# Patient Record
Sex: FEMALE | Race: WHITE | NOT HISPANIC OR LATINO | Employment: UNEMPLOYED | URBAN - NONMETROPOLITAN AREA
[De-identification: names, ages, dates, MRNs, and addresses within clinical notes are randomized per-mention and may not be internally consistent; named-entity substitution may affect disease eponyms.]

---

## 2017-01-05 RX ORDER — PEN NEEDLE, DIABETIC 32GX 5/32"
NEEDLE, DISPOSABLE MISCELLANEOUS
Qty: 50 EACH | Refills: 5 | Status: SHIPPED | OUTPATIENT
Start: 2017-01-05 | End: 2017-08-08

## 2017-01-10 ENCOUNTER — OFFICE VISIT (OUTPATIENT)
Dept: FAMILY MEDICINE CLINIC | Facility: CLINIC | Age: 52
End: 2017-01-10

## 2017-01-10 DIAGNOSIS — I10 ESSENTIAL HYPERTENSION: ICD-10-CM

## 2017-01-10 DIAGNOSIS — R30.0 DYSURIA: Primary | ICD-10-CM

## 2017-01-10 DIAGNOSIS — G89.4 CHRONIC PAIN SYNDROME: ICD-10-CM

## 2017-01-10 DIAGNOSIS — F17.200 SMOKER: ICD-10-CM

## 2017-01-10 DIAGNOSIS — Z00.00 HEALTHCARE MAINTENANCE: ICD-10-CM

## 2017-01-10 DIAGNOSIS — E78.2 MIXED HYPERLIPIDEMIA: ICD-10-CM

## 2017-01-10 DIAGNOSIS — E11.42 TYPE 2 DIABETES MELLITUS WITH PERIPHERAL NEUROPATHY (HCC): ICD-10-CM

## 2017-01-10 DIAGNOSIS — F32.89 OTHER DEPRESSION: ICD-10-CM

## 2017-01-10 DIAGNOSIS — K21.9 GASTROESOPHAGEAL REFLUX DISEASE WITHOUT ESOPHAGITIS: ICD-10-CM

## 2017-01-10 DIAGNOSIS — Z23 ENCOUNTER FOR IMMUNIZATION: ICD-10-CM

## 2017-01-10 LAB
ALBUMIN SERPL-MCNC: 4.4 G/DL (ref 3.5–5)
ALBUMIN UR-MCNC: 1574.3 MG/L
ALBUMIN/GLOB SERPL: 1.2 G/DL (ref 1.5–2.5)
ALP SERPL-CCNC: 243 U/L (ref 46–116)
ALT SERPL W P-5'-P-CCNC: 26 U/L (ref 10–36)
ANION GAP SERPL CALCULATED.3IONS-SCNC: 7.9 MMOL/L (ref 3.6–11.2)
AST SERPL-CCNC: 17 U/L (ref 10–30)
BASOPHILS # BLD AUTO: 0.04 10*3/MM3 (ref 0–0.3)
BASOPHILS NFR BLD AUTO: 0.3 % (ref 0–2)
BILIRUB SERPL-MCNC: 0.5 MG/DL (ref 0.2–1.8)
BUN BLD-MCNC: 10 MG/DL (ref 7–21)
BUN/CREAT SERPL: 16.9 (ref 7–25)
CALCIUM SPEC-SCNC: 9.9 MG/DL (ref 7.7–10)
CHLORIDE SERPL-SCNC: 95 MMOL/L (ref 99–112)
CHOLEST SERPL-MCNC: 518 MG/DL (ref 0–200)
CO2 SERPL-SCNC: 30.1 MMOL/L (ref 24.3–31.9)
CREAT BLD-MCNC: 0.59 MG/DL (ref 0.43–1.29)
DEPRECATED RDW RBC AUTO: 45.4 FL (ref 37–54)
EOSINOPHIL # BLD AUTO: 0.15 10*3/MM3 (ref 0–0.7)
EOSINOPHIL NFR BLD AUTO: 1 % (ref 0–5)
ERYTHROCYTE [DISTWIDTH] IN BLOOD BY AUTOMATED COUNT: 14 % (ref 11.5–14.5)
GFR SERPL CREATININE-BSD FRML MDRD: 108 ML/MIN/1.73
GLOBULIN UR ELPH-MCNC: 3.8 GM/DL
GLUCOSE BLD-MCNC: 425 MG/DL (ref 70–110)
HBA1C MFR BLD: 12.4 % (ref 4.5–5.7)
HCT VFR BLD AUTO: 53.5 % (ref 37–47)
HDLC SERPL-MCNC: 60 MG/DL (ref 60–100)
HGB BLD-MCNC: 17.6 G/DL (ref 12–16)
IMM GRANULOCYTES # BLD: 0.05 10*3/MM3 (ref 0–0.03)
IMM GRANULOCYTES NFR BLD: 0.3 % (ref 0–0.5)
LDLC SERPL CALC-MCNC: ABNORMAL MG/DL (ref 0–100)
LDLC/HDLC SERPL: ABNORMAL {RATIO}
LYMPHOCYTES # BLD AUTO: 2.8 10*3/MM3 (ref 1–3)
LYMPHOCYTES NFR BLD AUTO: 19.2 % (ref 21–51)
MCH RBC QN AUTO: 29.4 PG (ref 27–33)
MCHC RBC AUTO-ENTMCNC: 32.9 G/DL (ref 33–37)
MCV RBC AUTO: 89.5 FL (ref 80–94)
MONOCYTES # BLD AUTO: 1.13 10*3/MM3 (ref 0.1–0.9)
MONOCYTES NFR BLD AUTO: 7.7 % (ref 0–10)
NEUTROPHILS # BLD AUTO: 10.43 10*3/MM3 (ref 1.4–6.5)
NEUTROPHILS NFR BLD AUTO: 71.5 % (ref 30–70)
OSMOLALITY SERPL CALC.SUM OF ELEC: 283.6 MOSM/KG (ref 273–305)
PLATELET # BLD AUTO: 338 10*3/MM3 (ref 130–400)
PMV BLD AUTO: 12 FL (ref 6–10)
POTASSIUM BLD-SCNC: 4 MMOL/L (ref 3.5–5.3)
PROT SERPL-MCNC: 8.2 G/DL (ref 6–8)
RBC # BLD AUTO: 5.98 10*6/MM3 (ref 4.2–5.4)
SODIUM BLD-SCNC: 133 MMOL/L (ref 135–153)
TRIGL SERPL-MCNC: 1628 MG/DL (ref 0–150)
TSH SERPL DL<=0.05 MIU/L-ACNC: 1.3 MIU/ML (ref 0.55–4.78)
VLDLC SERPL-MCNC: ABNORMAL MG/DL
WBC NRBC COR # BLD: 14.6 10*3/MM3 (ref 4.5–12.5)

## 2017-01-10 PROCEDURE — 90732 PPSV23 VACC 2 YRS+ SUBQ/IM: CPT | Performed by: GENERAL PRACTICE

## 2017-01-10 PROCEDURE — 82043 UR ALBUMIN QUANTITATIVE: CPT | Performed by: GENERAL PRACTICE

## 2017-01-10 PROCEDURE — 80053 COMPREHEN METABOLIC PANEL: CPT | Performed by: GENERAL PRACTICE

## 2017-01-10 PROCEDURE — 84443 ASSAY THYROID STIM HORMONE: CPT | Performed by: GENERAL PRACTICE

## 2017-01-10 PROCEDURE — 85025 COMPLETE CBC W/AUTO DIFF WBC: CPT | Performed by: GENERAL PRACTICE

## 2017-01-10 PROCEDURE — G0009 ADMIN PNEUMOCOCCAL VACCINE: HCPCS | Performed by: GENERAL PRACTICE

## 2017-01-10 PROCEDURE — 83036 HEMOGLOBIN GLYCOSYLATED A1C: CPT | Performed by: GENERAL PRACTICE

## 2017-01-10 PROCEDURE — 80061 LIPID PANEL: CPT | Performed by: GENERAL PRACTICE

## 2017-01-10 PROCEDURE — 99214 OFFICE O/P EST MOD 30 MIN: CPT | Performed by: GENERAL PRACTICE

## 2017-01-10 PROCEDURE — 36415 COLL VENOUS BLD VENIPUNCTURE: CPT | Performed by: GENERAL PRACTICE

## 2017-01-10 NOTE — MR AVS SNAPSHOT
Jocelyn Palomo   1/10/2017 3:00 PM   Office Visit    Dept Phone:  [phone]   Encounter #:  [id number]    Provider:  Miki Christianson MD   Department:  [facility] FAMILY MEDICINE                Your Full Care Plan              Today's Medication Changes          These changes are accurate as of: 1/10/17  3:50 PM.  If you have any questions, ask your nurse or doctor.               Medication(s)that have changed:     insulin detemir 100 UNIT/ML injection   Commonly known as:  LEVEMIR   Inject 40 Units under the skin Daily.   What changed:  how much to take   Changed by:  Miki Christianson MD            Where to Get Your Medications      These medications were sent to [place], [place] - [address] - [phone] [facility] [phone] [address] [address]     Phone:  [phone]     insulin aspart 100 UNIT/ML solution pen-injector sc pen    insulin detemir 100 UNIT/ML injection                  Your Updated Medication List          This list is accurate as of: 1/10/17  3:50 PM.  Always use your most recent med list.                atorvastatin 80 MG tablet   Commonly known as:  LIPITOR   Take 1 tablet by mouth every night.       * BD PEN NEEDLE SNEHA U/F 32G X 4 MM misc   Generic drug:  Insulin Pen Needle   USE WITH INSULIN ONCE DAILY       * Insulin Pen Needle 32G X 4 MM misc   1 each 4 (Four) Times a Day.       fenofibrate 145 MG tablet   Commonly known as:  TRICOR   Take 1 tablet by mouth daily.       gabapentin 600 MG tablet   Commonly known as:  NEURONTIN       insulin aspart 100 UNIT/ML solution pen-injector sc pen   Commonly known as:  novoLOG FLEXPEN   5 units SC before each meal       insulin detemir 100 UNIT/ML injection   Commonly known as:  LEVEMIR   Inject 40 Units under the skin Daily.       lisinopril-hydrochlorothiazide 20-25 MG per tablet   Commonly known as:  PRINZIDE,ZESTORETIC   Take 1 tablet by mouth  every morning.       Omega 3 1000 MG capsule   Take 2,000 mg by mouth 2 (two) times a day.       ondansetron 4 MG tablet   Commonly known as:  ZOFRAN   Take 1 tablet by mouth every 8 (eight) hours as needed for nausea or vomiting.       oxyCODONE-acetaminophen  MG per tablet   Commonly known as:  PERCOCET       pantoprazole 40 MG EC tablet   Commonly known as:  PROTONIX   Take 1 tablet by mouth daily.       sertraline 50 MG tablet   Commonly known as:  ZOLOFT   Take 1 tablet by mouth daily.       * Notice:  This list has 2 medication(s) that are the same as other medications prescribed for you. Read the directions carefully, and ask your doctor or other care provider to review them with you.            We Performed the Following     CBC & Differential     CBC Auto Differential     Comprehensive Metabolic Panel     Hemoglobin A1c     Lipid Panel     MicroAlbumin, Urine, Random     Pneumococcal Polysaccharide Vaccine 23-Valent Greater Than or Equal To 1yo Subcutaneous / IM     TSH       You Were Diagnosed With        Codes Comments    Dysuria    -  Primary ICD-10-CM: R30.0  ICD-9-CM: 788.1     Essential hypertension     ICD-10-CM: I10  ICD-9-CM: 401.9     Gastroesophageal reflux disease without esophagitis     ICD-10-CM: K21.9  ICD-9-CM: 530.81     Type 2 diabetes mellitus with peripheral neuropathy     ICD-10-CM: E11.42  ICD-9-CM: 250.60, 357.2     Chronic pain syndrome     ICD-10-CM: G89.4  ICD-9-CM: 338.4     Mixed hyperlipidemia     ICD-10-CM: E78.2  ICD-9-CM: 272.2     Smoker     ICD-10-CM: F17.200  ICD-9-CM: 305.1     Encounter for immunization     ICD-10-CM: Z23  ICD-9-CM: V03.89     Healthcare maintenance     ICD-10-CM: Z00.00  ICD-9-CM: V70.0       Instructions     None    Patient Instructions History      Upcoming Appointments     Visit Type Date Time Department    OFFICE VISIT 1/10/2017  3:00 PM [facility]    OFFICE VISIT 4/13/2017 11:00 AM [facility]      Deborah Signup     Our  "records indicate that you have declined [facility] MyChart signup. If you would like to sign up for [facility], please email [email or web address] or call [phone] to obtain an activation code.             Other Info from Your Visit           Your Appointments     Apr 13, 2017 11:00 AM EDT   Office Visit with Miki Christianson MD   [facility] (--)    [address] [place] [id number] [phone]           Please arrive 10 minutes early. Bring a complete list of all medications and bring any previous records or diagnostic testing results.              Allergies     No Known Allergies      Vital Signs     Pulse Temperature Height Weight Oxygen Saturation Body Mass Index    100 98.6 °F (37 °C) (Tympanic) 65\" (165.1 cm) 174 lb (78.9 kg) 96% 28.96 kg/m2    Smoking Status                   Current Every Day Smoker           Problems and Diagnoses Noted     Chronic pain syndrome    Encounter for immunization    High blood pressure    Acid reflux disease    Routine medical exam    Mixed hyperlipidemia    Smoker    Type 2 diabetes mellitus with peripheral neuropathy    Difficult or painful urination    -  Primary      Immunizations Administered     Name Date    Pneumococcal Polysaccharide       Results         "

## 2017-01-11 ENCOUNTER — TELEPHONE (OUTPATIENT)
Dept: FAMILY MEDICINE CLINIC | Facility: CLINIC | Age: 52
End: 2017-01-11

## 2017-01-11 VITALS
RESPIRATION RATE: 12 BRPM | TEMPERATURE: 98.6 F | SYSTOLIC BLOOD PRESSURE: 125 MMHG | HEIGHT: 65 IN | BODY MASS INDEX: 28.99 KG/M2 | DIASTOLIC BLOOD PRESSURE: 75 MMHG | WEIGHT: 174 LBS | OXYGEN SATURATION: 96 % | HEART RATE: 100 BPM

## 2017-01-11 NOTE — PROGRESS NOTES
Subjective   Jocelyn Palomo is a 51 y.o. female.     History of Present Illness     Diabetes  Current symptoms include paresthesia of the feet. Patient denies visual disturbances, polydipsia, polyuria, hypoglycemia and foot ulcerations. Evaluation to date has been: fasting blood sugar and hemoglobin A1C. Home sugars: BGs remain high particularly fasting. Current treatments: basal insulin - Levemir 30 units, and bolus insulin with meals - NovoLog 5 units.  Last dilated eye exam 2 to 3 years ago.      Dyslipidemia  Compliance with treatment has been fair. The patient exercises occasionally. She is currently being prescribed the following medication for her dyslipidemia - Lipitor (atorvastatin), Omega 3 fatty acids, Fenofibrate. Patient denies side effects associated with her medications.     Hypertension  Home blood pressure readings: not doing. Associated signs and symptoms: none. Patient denies: chest pain, palpitations, dyspnea, orthopnea, paroxysmal nocturnal dyspnea and peripheral edema. Current antihypertensive medications includes lisinopril (Prinivil, Zestril) and hydrochlorothiazide. Medication compliance: taking as prescribed.     Depression  Onset was many years ago. Symptoms have been stable since last here. Current symptoms include: depressed mood, difficulty concentrating and fatigue. Patient denies anhedonia, recurrent thoughts of death and suicidal thoughts. Risk factors: previous episode of depression and her son is currently on house arrest for his 4th DUI. Treatment has included psychotherapy and medication sertraline. She complains of the following side effects from the treatment: none.  She continues to feel that she is coping    The following portions of the patient's history were reviewed and updated as appropriate: allergies, current medications, past medical history, past social history and problem list.    Review of Systems   Constitutional: Positive for fatigue. Negative for appetite change,  chills, fever and unexpected weight change.   HENT: Negative for congestion, ear pain, rhinorrhea, sneezing, sore throat and voice change.    Eyes: Negative for visual disturbance.   Respiratory: Negative for cough, shortness of breath and wheezing.    Cardiovascular: Negative for chest pain, palpitations and leg swelling.   Gastrointestinal: Negative for abdominal pain, blood in stool, constipation, diarrhea, nausea and vomiting.   Endocrine: Negative for polydipsia.   Genitourinary: Negative for difficulty urinating, dysuria, frequency, hematuria, menstrual problem, pelvic pain, urgency, vaginal bleeding and vaginal discharge.   Musculoskeletal: Positive for arthralgias and back pain. Negative for joint swelling, myalgias and neck pain.   Skin: Negative for color change.   Neurological: Positive for numbness. Negative for tremors, weakness and headaches.   Psychiatric/Behavioral: Positive for decreased concentration and sleep disturbance. Negative for dysphoric mood and suicidal ideas. The patient is not nervous/anxious.      Objective   Physical Exam   Constitutional: She is oriented to person, place, and time. No distress.   HENT:   Head: Atraumatic.   Right Ear: Tympanic membrane, external ear and ear canal normal.   Left Ear: Tympanic membrane, external ear and ear canal normal.   Nose: Nose normal.   Mouth/Throat: Oropharynx is clear and moist. Mucous membranes are not pale and not cyanotic.   Eyes: EOM are normal. Pupils are equal, round, and reactive to light. No scleral icterus.   Neck: No JVD present. Carotid bruit is not present. No tracheal deviation present. No thyromegaly present.   Cardiovascular: Normal rate, regular rhythm, S1 normal, S2 normal and intact distal pulses.  Exam reveals no gallop, no S3 and no S4.    No murmur heard.  Pulmonary/Chest: Breath sounds normal. No stridor. No respiratory distress.   Abdominal: Soft. Normal aorta and bowel sounds are normal. She exhibits no distension, no  abdominal bruit and no mass. There is no hepatosplenomegaly. There is no tenderness. No hernia.   Musculoskeletal: She exhibits no tenderness or deformity.       Vascular Status -  Her exam exhibits no right foot edema. Her exam exhibits no left foot edema.  Lymphadenopathy:        Head (right side): No submandibular adenopathy present.        Head (left side): No submandibular adenopathy present.     She has no cervical adenopathy.   Neurological: She is alert and oriented to person, place, and time. She has normal reflexes. She displays normal reflexes. A sensory deficit (decreased vibration sense both feet) is present. No cranial nerve deficit. She exhibits normal muscle tone. Coordination normal.   Skin: Skin is warm and dry. No rash noted. She is not diaphoretic. No cyanosis. No pallor. Nails show no clubbing.   Psychiatric: Her speech is normal and behavior is normal. Thought content normal. Her affect is blunt (somewhat). Cognition and memory are normal.     Assessment/Plan   Problems Addressed this Visit        Cardiovascular and Mediastinum    Essential hypertension  Hypertension: at goal. Evidence of target organ damage: none.  Encouraged to continue to work on diet and exercise plan.   Continue current medication  Updated labs drawn    Relevant Orders    CBC & Differential (Completed)    Comprehensive Metabolic Panel (Completed)    CBC Auto Differential (Completed)    Osmolality, Calculated (Completed)       Digestive    Gastroesophageal reflux disease without esophagitis       Endocrine    Type 2 diabetes mellitus with peripheral neuropathy  Diabetes mellitus Type II, under poor control.   Encouraged to continue to pursue ADA diet  Encouraged aerobic exercise.  Increased dose of insulin: lantus to 40 qd and novolog to 6 with each meal.  Reminded to get yearly retinal exam.  Will notfiy patient of todays labs and any further action to be taken    Relevant Medications    insulin detemir (LEVEMIR) 100  UNIT/ML injection    insulin aspart (novoLOG FLEXPEN) 100 UNIT/ML solution pen-injector sc pen    Other Relevant Orders    Hemoglobin A1c (Completed)    MicroAlbumin, Urine, Random (Completed)       Other    Depression  Significant situational component. Supportive therapy. Will continue current medication.    Chronic pain syndrome  Reminded to follow up with pain management    Mixed hyperlipidemia  As above. Continue current medication.    Relevant Orders    Lipid Panel (Completed)    TSH (Completed)    Smoker    Encounter for immunization  Recommended a pneumovax    Relevant Orders    Pneumococcal Polysaccharide Vaccine 23-Valent Greater Than or Equal To 1yo Subcutaneous / IM (Completed)    Healthcare maintenance  Patient remains uninterested in a mammogram or screening colonoscopy

## 2017-01-11 NOTE — TELEPHONE ENCOUNTER
----- Message from Miki Christianson MD sent at 1/11/2017  3:14 PM EST -----  Needs appt with me in about 6 weeks  Needs to bring her sugar logs and all of her medication bottles

## 2017-03-07 DIAGNOSIS — K21.9 GASTROESOPHAGEAL REFLUX DISEASE WITHOUT ESOPHAGITIS: ICD-10-CM

## 2017-03-08 RX ORDER — PANTOPRAZOLE SODIUM 40 MG/1
40 TABLET, DELAYED RELEASE ORAL DAILY
Qty: 30 TABLET | Refills: 5 | Status: SHIPPED | OUTPATIENT
Start: 2017-03-08 | End: 2017-08-08 | Stop reason: SDUPTHER

## 2017-04-19 DIAGNOSIS — K21.9 GASTROESOPHAGEAL REFLUX DISEASE WITHOUT ESOPHAGITIS: ICD-10-CM

## 2017-04-20 RX ORDER — ONDANSETRON 4 MG/1
TABLET, FILM COATED ORAL
Qty: 30 TABLET | Refills: 0 | Status: SHIPPED | OUTPATIENT
Start: 2017-04-20 | End: 2017-07-24

## 2017-05-02 DIAGNOSIS — K21.9 GASTROESOPHAGEAL REFLUX DISEASE WITHOUT ESOPHAGITIS: ICD-10-CM

## 2017-05-03 DIAGNOSIS — K21.9 GASTROESOPHAGEAL REFLUX DISEASE WITHOUT ESOPHAGITIS: ICD-10-CM

## 2017-05-03 RX ORDER — ONDANSETRON 4 MG/1
TABLET, FILM COATED ORAL
Qty: 30 TABLET | Refills: 0 | Status: SHIPPED | OUTPATIENT
Start: 2017-05-03 | End: 2017-07-24

## 2017-05-23 ENCOUNTER — TELEPHONE (OUTPATIENT)
Dept: FAMILY MEDICINE CLINIC | Facility: CLINIC | Age: 52
End: 2017-05-23

## 2017-07-24 RX ORDER — ONDANSETRON HYDROCHLORIDE 8 MG/1
8 TABLET, FILM COATED ORAL EVERY 8 HOURS PRN
Qty: 30 TABLET | Refills: 2 | Status: SHIPPED | OUTPATIENT
Start: 2017-07-24 | End: 2017-08-08 | Stop reason: SDUPTHER

## 2017-08-08 ENCOUNTER — OFFICE VISIT (OUTPATIENT)
Dept: FAMILY MEDICINE CLINIC | Facility: CLINIC | Age: 52
End: 2017-08-08

## 2017-08-08 DIAGNOSIS — Z00.00 HEALTHCARE MAINTENANCE: ICD-10-CM

## 2017-08-08 DIAGNOSIS — K21.9 GASTROESOPHAGEAL REFLUX DISEASE WITHOUT ESOPHAGITIS: ICD-10-CM

## 2017-08-08 DIAGNOSIS — F33.41 RECURRENT MAJOR DEPRESSIVE DISORDER, IN PARTIAL REMISSION (HCC): ICD-10-CM

## 2017-08-08 DIAGNOSIS — E78.2 MIXED HYPERLIPIDEMIA: Primary | ICD-10-CM

## 2017-08-08 DIAGNOSIS — I10 ESSENTIAL HYPERTENSION: ICD-10-CM

## 2017-08-08 DIAGNOSIS — F17.200 SMOKER: ICD-10-CM

## 2017-08-08 DIAGNOSIS — F33.2 MAJOR DEPRESSIVE DISORDER, RECURRENT, SEVERE WITHOUT PSYCHOTIC FEATURES (HCC): ICD-10-CM

## 2017-08-08 DIAGNOSIS — G89.4 CHRONIC PAIN SYNDROME: ICD-10-CM

## 2017-08-08 DIAGNOSIS — E11.42 TYPE 2 DIABETES MELLITUS WITH PERIPHERAL NEUROPATHY (HCC): ICD-10-CM

## 2017-08-08 LAB
ALBUMIN SERPL-MCNC: 4.1 G/DL (ref 3.5–5)
ALBUMIN/GLOB SERPL: 1 G/DL (ref 1.5–2.5)
ALP SERPL-CCNC: 218 U/L (ref 35–104)
ALT SERPL W P-5'-P-CCNC: 24 U/L (ref 10–36)
ANION GAP SERPL CALCULATED.3IONS-SCNC: 5.8 MMOL/L (ref 3.6–11.2)
AST SERPL-CCNC: 19 U/L (ref 10–30)
BASOPHILS # BLD AUTO: 0.07 10*3/MM3 (ref 0–0.3)
BASOPHILS NFR BLD AUTO: 0.5 % (ref 0–2)
BILIRUB SERPL-MCNC: 0.4 MG/DL (ref 0.2–1.8)
BUN BLD-MCNC: 7 MG/DL (ref 7–21)
BUN/CREAT SERPL: 9.2 (ref 7–25)
CALCIUM SPEC-SCNC: 9.8 MG/DL (ref 7.7–10)
CHLORIDE SERPL-SCNC: 102 MMOL/L (ref 99–112)
CHOLEST SERPL-MCNC: 231 MG/DL (ref 0–200)
CO2 SERPL-SCNC: 29.2 MMOL/L (ref 24.3–31.9)
CREAT BLD-MCNC: 0.76 MG/DL (ref 0.43–1.29)
DEPRECATED RDW RBC AUTO: 42.4 FL (ref 37–54)
EOSINOPHIL # BLD AUTO: 0.26 10*3/MM3 (ref 0–0.7)
EOSINOPHIL NFR BLD AUTO: 1.8 % (ref 0–5)
ERYTHROCYTE [DISTWIDTH] IN BLOOD BY AUTOMATED COUNT: 13.3 % (ref 11.5–14.5)
GFR SERPL CREATININE-BSD FRML MDRD: 80 ML/MIN/1.73
GLOBULIN UR ELPH-MCNC: 4 GM/DL
GLUCOSE BLD-MCNC: 172 MG/DL (ref 70–110)
HBA1C MFR BLD: 8.1 % (ref 4.5–5.7)
HCT VFR BLD AUTO: 45 % (ref 37–47)
HDLC SERPL-MCNC: 40 MG/DL (ref 60–100)
HGB BLD-MCNC: 15.1 G/DL (ref 12–16)
IMM GRANULOCYTES # BLD: 0.03 10*3/MM3 (ref 0–0.03)
IMM GRANULOCYTES NFR BLD: 0.2 % (ref 0–0.5)
LDLC SERPL CALC-MCNC: ABNORMAL MG/DL (ref 0–100)
LDLC/HDLC SERPL: ABNORMAL {RATIO}
LYMPHOCYTES # BLD AUTO: 3.56 10*3/MM3 (ref 1–3)
LYMPHOCYTES NFR BLD AUTO: 24.6 % (ref 21–51)
MCH RBC QN AUTO: 29.6 PG (ref 27–33)
MCHC RBC AUTO-ENTMCNC: 33.6 G/DL (ref 33–37)
MCV RBC AUTO: 88.2 FL (ref 80–94)
MONOCYTES # BLD AUTO: 0.95 10*3/MM3 (ref 0.1–0.9)
MONOCYTES NFR BLD AUTO: 6.6 % (ref 0–10)
NEUTROPHILS # BLD AUTO: 9.6 10*3/MM3 (ref 1.4–6.5)
NEUTROPHILS NFR BLD AUTO: 66.3 % (ref 30–70)
OSMOLALITY SERPL CALC.SUM OF ELEC: 275.9 MOSM/KG (ref 273–305)
PLATELET # BLD AUTO: 287 10*3/MM3 (ref 130–400)
PMV BLD AUTO: 12.1 FL (ref 6–10)
POTASSIUM BLD-SCNC: 3.9 MMOL/L (ref 3.5–5.3)
PROT SERPL-MCNC: 8.1 G/DL (ref 6–8)
RBC # BLD AUTO: 5.1 10*6/MM3 (ref 4.2–5.4)
SODIUM BLD-SCNC: 137 MMOL/L (ref 135–153)
TRIGL SERPL-MCNC: 425 MG/DL (ref 0–150)
TSH SERPL DL<=0.05 MIU/L-ACNC: 1.29 MIU/ML (ref 0.55–4.78)
VLDLC SERPL-MCNC: ABNORMAL MG/DL
WBC NRBC COR # BLD: 14.47 10*3/MM3 (ref 4.5–12.5)

## 2017-08-08 PROCEDURE — 84443 ASSAY THYROID STIM HORMONE: CPT | Performed by: GENERAL PRACTICE

## 2017-08-08 PROCEDURE — 80053 COMPREHEN METABOLIC PANEL: CPT | Performed by: GENERAL PRACTICE

## 2017-08-08 PROCEDURE — 83036 HEMOGLOBIN GLYCOSYLATED A1C: CPT | Performed by: GENERAL PRACTICE

## 2017-08-08 PROCEDURE — 85025 COMPLETE CBC W/AUTO DIFF WBC: CPT | Performed by: GENERAL PRACTICE

## 2017-08-08 PROCEDURE — 36415 COLL VENOUS BLD VENIPUNCTURE: CPT | Performed by: GENERAL PRACTICE

## 2017-08-08 PROCEDURE — 99214 OFFICE O/P EST MOD 30 MIN: CPT | Performed by: GENERAL PRACTICE

## 2017-08-08 PROCEDURE — 80061 LIPID PANEL: CPT | Performed by: GENERAL PRACTICE

## 2017-08-08 RX ORDER — LISINOPRIL AND HYDROCHLOROTHIAZIDE 25; 20 MG/1; MG/1
1 TABLET ORAL EVERY MORNING
Qty: 30 TABLET | Refills: 5 | Status: SHIPPED | OUTPATIENT
Start: 2017-08-08 | End: 2018-03-02 | Stop reason: SDUPTHER

## 2017-08-08 RX ORDER — ONDANSETRON HYDROCHLORIDE 8 MG/1
8 TABLET, FILM COATED ORAL EVERY 8 HOURS PRN
Qty: 30 TABLET | Refills: 2 | Status: SHIPPED | OUTPATIENT
Start: 2017-08-08 | End: 2018-03-02

## 2017-08-08 RX ORDER — GABAPENTIN 600 MG/1
600 TABLET ORAL 3 TIMES DAILY
Qty: 90 TABLET | Refills: 2 | Status: SHIPPED | OUTPATIENT
Start: 2017-08-08 | End: 2018-03-02

## 2017-08-08 RX ORDER — OMEGA-3 FATTY ACIDS/FISH OIL 300-1000MG
2000 CAPSULE ORAL 2 TIMES DAILY
Qty: 120 EACH | Refills: 5 | Status: SHIPPED | OUTPATIENT
Start: 2017-08-08 | End: 2018-03-02 | Stop reason: SDUPTHER

## 2017-08-08 RX ORDER — FENOFIBRATE 145 MG/1
145 TABLET, COATED ORAL DAILY
Qty: 30 TABLET | Refills: 5 | Status: SHIPPED | OUTPATIENT
Start: 2017-08-08 | End: 2018-03-02 | Stop reason: SDUPTHER

## 2017-08-08 RX ORDER — PANTOPRAZOLE SODIUM 40 MG/1
40 TABLET, DELAYED RELEASE ORAL DAILY
Qty: 30 TABLET | Refills: 5 | Status: SHIPPED | OUTPATIENT
Start: 2017-08-08 | End: 2018-03-02 | Stop reason: SDUPTHER

## 2017-08-08 RX ORDER — ATORVASTATIN CALCIUM 80 MG/1
80 TABLET, FILM COATED ORAL NIGHTLY
Qty: 30 TABLET | Refills: 5 | Status: SHIPPED | OUTPATIENT
Start: 2017-08-08 | End: 2018-03-02 | Stop reason: SDUPTHER

## 2017-08-08 NOTE — PROGRESS NOTES
Subjective   Jocelyn Palomo is a 52 y.o. female.     History of Present Illness     Diabetes  Current symptoms include paresthesia of the feet. Patient denies visual disturbances, polydipsia, polyuria, hypoglycemia and foot ulcerations. Evaluation to date has been: hemoglobin A1C. Home sugars: BGs consistently in an acceptable range. Current treatments: basal insulin - levemir 30 qd and mealtime insulin - novolog 5 with meals. Last dilated eye exam more then 3 years ago. Most recent hemoglobin A1c   Lab Results   Component Value Date    HGBA1C 8.10 (H) 08/08/2017    HGBA1C 12.40 (H) 01/10/2017    HGBA1C 12.0 (H) 03/01/2016    HGBA1C 12.1 (H) 10/27/2015    HGBA1C 12.9 (H) 02/25/2014   An earlier fappointment was arranged but she repeatedly failed to follow up with this    Dyslipidemia  Compliance with treatment has been fair. The patient exercises occasionally. She is currently being prescribed the following medication for her dyslipidemia - atorvastatin, omega 3 fatty acids. Patient denies side effects associated with her medications. Most recent lipids include  Lab Results   Component Value Date    TRIG 425 (H) 08/08/2017    TRIG 1628 (H) 01/10/2017    HDL 40 (L) 08/08/2017    HDL 60 01/10/2017    LDLCALC  08/08/2017      Comment:      Unable to calculate    LDLCALC  01/10/2017      Comment:      Unable to calculate    LDL No Calculation 03/01/2016    LDL No Calculation 10/27/2015     Hypertension  Home blood pressure readings: not doing. Associated signs and symptoms: none. Patient denies: chest pain, palpitations, dyspnea, orthopnea, paroxysmal nocturnal dyspnea and peripheral edema. Current antihypertensive medications includes lisinopril and hydrochlorothiazide. Medication compliance: taking as prescribed. Most recent creatinine   Lab Results   Component Value Date    CREATININE 0.76 08/08/2017     Depression  Onset was many years ago. Symptoms have been stable since last here. Current symptoms include: depressed  mood, difficulty concentrating and fatigue. Patient denies anhedonia, recurrent thoughts of death and suicidal thoughts. Risk factors: previous episode of depression and significant family stressors. Treatment has included psychotherapy and medication sertraline. She complains of the following side effects from the treatment: none.  She continues to feel that she is coping    Chronic Pain Syndrome  She has chronic low back and joint pain as well as burning and tingling about both feet. She was under the care of pain management but states that injections were no longer felt to be appropriate and that she was discharged. She would like referral to another clinic but in the meantime would like her gabapentin prescribed here.     The following portions of the patient's history were reviewed and updated as appropriate: allergies, current medications, past medical history, past social history and problem list.    Review of Systems   Constitutional: Positive for fatigue. Negative for appetite change, chills, fever and unexpected weight change.   HENT: Negative for congestion, ear pain, rhinorrhea, sneezing, sore throat and voice change.    Eyes: Negative for visual disturbance.   Respiratory: Negative for cough, shortness of breath and wheezing.    Cardiovascular: Negative for chest pain, palpitations and leg swelling.   Gastrointestinal: Negative for abdominal pain, blood in stool, constipation, diarrhea, nausea and vomiting.   Endocrine: Negative for polydipsia.   Genitourinary: Negative for difficulty urinating, dysuria, frequency, hematuria, menstrual problem, pelvic pain, urgency, vaginal bleeding and vaginal discharge.   Musculoskeletal: Positive for arthralgias and back pain. Negative for joint swelling, myalgias and neck pain.   Skin: Negative for color change.   Neurological: Positive for numbness. Negative for tremors, weakness and headaches.   Psychiatric/Behavioral: Positive for decreased concentration and  sleep disturbance. Negative for dysphoric mood and suicidal ideas. The patient is not nervous/anxious.      Objective   Physical Exam   Constitutional: She is oriented to person, place, and time. No distress.   Alert and fully oriented. No apparent distress. No pallor, jaundice, diaphoresis, or cyanosis.     HENT:   Head: Atraumatic.   Right Ear: Tympanic membrane, external ear and ear canal normal.   Left Ear: Tympanic membrane, external ear and ear canal normal.   Nose: Nose normal.   Mouth/Throat: Oropharynx is clear and moist. Mucous membranes are not pale and not cyanotic.   Eyes: EOM are normal. Pupils are equal, round, and reactive to light. No scleral icterus.   Neck: No JVD present. Carotid bruit is not present. No tracheal deviation present. No thyromegaly present.   Cardiovascular: Normal rate, regular rhythm, S1 normal, S2 normal and intact distal pulses.  Exam reveals no gallop, no S3 and no S4.    No murmur heard.  Pulmonary/Chest: Breath sounds normal. No stridor. No respiratory distress.   Abdominal: Soft. Normal aorta and bowel sounds are normal. She exhibits no distension, no abdominal bruit and no mass. There is no hepatosplenomegaly. There is no tenderness. No hernia.   Musculoskeletal: She exhibits no tenderness or deformity.    Jocelyn had a diabetic foot exam performed today.    Vascular Status -  Her exam exhibits no right foot edema. Her exam exhibits no left foot edema.  Lymphadenopathy:        Head (right side): No submandibular adenopathy present.        Head (left side): No submandibular adenopathy present.     She has no cervical adenopathy.   Neurological: She is alert and oriented to person, place, and time. She has normal reflexes. She displays normal reflexes. A sensory deficit (decreased vibration sense both feet) is present. No cranial nerve deficit. She exhibits normal muscle tone. Coordination normal.   Skin: Skin is warm and dry. No rash noted. She is not diaphoretic. No  cyanosis. No pallor. Nails show no clubbing.   Psychiatric: Her speech is normal and behavior is normal. Thought content normal. Her affect is blunt (somewhat). Cognition and memory are normal.     Assessment/Plan   Problems Addressed this Visit        Cardiovascular and Mediastinum    Mixed hyperlipidemia   Encouraged to continue to work on her diet and exercise plan.  Updated labs drawn    Relevant Medications    atorvastatin (LIPITOR) 80 MG tablet    fenofibrate (TRICOR) 145 MG tablet    Other Relevant Orders    Lipid Panel (Completed)    TSH (Completed)    Essential hypertension  Hypertension: at goal. Evidence of target organ damage: none.  Continue current medication    Relevant Medications    lisinopril-hydrochlorothiazide (PRINZIDE,ZESTORETIC) 20-25 MG per tablet    Other Relevant Orders    CBC & Differential (Completed)    Comprehensive Metabolic Panel (Completed)    CBC Auto Differential (Completed)    Osmolality, Calculated (Completed)       Digestive    Gastroesophageal reflux disease without esophagitis    Relevant Medications    pantoprazole (PROTONIX) 40 MG EC tablet    ondansetron (ZOFRAN) 8 MG tablet       Endocrine    Type 2 diabetes mellitus with peripheral neuropathy  Diabetes mellitus Type II, under poor control.   Encouraged to continue to pursue ADA diet  Encouraged aerobic exercise.  Increased dose of insulin: levemir to 40 qd and novolog to 6 with meals.  Reminded to get yearly retinal exam.  Will arrange an endocrinology appointment as little progress is being made here toward improving her glycemic control    Relevant Medications    insulin aspart (novoLOG FLEXPEN) 100 UNIT/ML solution pen-injector sc pen    insulin detemir (LEVEMIR) 100 UNIT/ML injection    gabapentin (NEURONTIN) 600 MG tablet    Insulin Pen Needle 32G X 4 MM misc    Omega 3 1000 MG capsule    Other Relevant Orders    Hemoglobin A1c (Completed)    MicroAlbumin, Urine, Random       Other    Recurrent major depressive  disorder, in partial remission  Significant situational component. Supportive therapy. Will continue current medication.    Chronic pain syndrome  Gabapentin will be prescribed here until care with another pain management physician is set up. Patient is aware that this is now a controlled mediation in the [facility] and that she will be subject to random urine drug screens and pills counts    Relevant Medications    gabapentin (NEURONTIN) 600 MG tablet    Other Relevant Orders    Ambulatory Referral to Pain Management (Completed)    Smoker    Healthcare maintenance  Patient remains uninterested in a mammogram or screening colonoscopy  Reminded to get a flu shot when available

## 2017-08-09 ENCOUNTER — DOCUMENTATION (OUTPATIENT)
Dept: FAMILY MEDICINE CLINIC | Facility: CLINIC | Age: 52
End: 2017-08-09

## 2017-08-09 VITALS
BODY MASS INDEX: 27.82 KG/M2 | OXYGEN SATURATION: 97 % | RESPIRATION RATE: 12 BRPM | SYSTOLIC BLOOD PRESSURE: 120 MMHG | HEIGHT: 65 IN | TEMPERATURE: 98.7 F | WEIGHT: 167 LBS | HEART RATE: 102 BPM | DIASTOLIC BLOOD PRESSURE: 70 MMHG

## 2017-08-18 ENCOUNTER — DOCUMENTATION (OUTPATIENT)
Dept: FAMILY MEDICINE CLINIC | Facility: CLINIC | Age: 52
End: 2017-08-18

## 2017-08-18 NOTE — PROGRESS NOTES
8/18/2017 tmills    Per Dr Christianson's request, called [facility] pharmacy canceled refills on gabapentin (s/w lou at [facility])  Also tried calling patient to let her know that dr Christianson will not be continuing her gabapentin due to she failed her UDS. Left a message on  to please return my call. tm

## 2017-08-23 DIAGNOSIS — G89.4 CHRONIC PAIN SYNDROME: ICD-10-CM

## 2017-08-23 DIAGNOSIS — E11.42 TYPE 2 DIABETES MELLITUS WITH PERIPHERAL NEUROPATHY (HCC): Primary | ICD-10-CM

## 2017-09-05 ENCOUNTER — DOCUMENTATION (OUTPATIENT)
Dept: FAMILY MEDICINE CLINIC | Facility: CLINIC | Age: 52
End: 2017-09-05

## 2017-09-05 NOTE — PROGRESS NOTES
Pt called and ask why her gabapentin had been d/c at the pharmacy. Dr Christianson had instructed Ana Chávezs to call and discontinue Gabapentin due to a failed UDS. Advised pt to make an appt with Dr Christianson or one of the ARNP to discuss any issues with this. PKF.

## 2017-09-25 ENCOUNTER — TELEPHONE (OUTPATIENT)
Dept: FAMILY MEDICINE CLINIC | Facility: CLINIC | Age: 52
End: 2017-09-25

## 2017-09-25 NOTE — TELEPHONE ENCOUNTER
----- Message from Rosanna Collins MA sent at 9/25/2017  8:51 AM EDT -----  Regarding: RE: Pt requesting to be referred to pain clinic in [place]  No one in [place] takes her insurance.       ----- Message -----     From: Miki Christianson MD     Sent: 9/23/2017   9:52 AM       To: Miki Solomon MA, #  Subject: RE: Pt requesting to be referred to pain cli#    Can try to refer    ----- Message -----     From: Miki Solomon MA     Sent: 9/22/2017   2:18 PM       To: Miki Christianson MD  Subject: Pt requesting to be referred to pain clinic #    This patient called today asking if she could be referred to pain clinic in [place]. I think she had been referred to [place] but she has not transportation. She said she was trying to get a car and maybe she can go closer to [place]. Please advise. PKF.

## 2017-10-17 ENCOUNTER — TELEPHONE (OUTPATIENT)
Dept: FAMILY MEDICINE CLINIC | Facility: CLINIC | Age: 52
End: 2017-10-17

## 2017-10-17 NOTE — TELEPHONE ENCOUNTER
----- Message from Miki Christianson MD sent at 9/25/2017  4:54 PM EDT -----  Regarding: RE: Pt requesting to be referred to pain clinic in [place]  Please let her know - can refer to someone in [place] if she wishes    ----- Message -----     From: Rosanna Collins MA     Sent: 9/25/2017   8:51 AM       To: Miki Christianson MD, #  Subject: RE: Pt requesting to be referred to pain cli#    No one in [place] takes her insurance.       ----- Message -----     From: Miki Christianson MD     Sent: 9/23/2017   9:52 AM       To: Miki Solomon MA, #  Subject: RE: Pt requesting to be referred to pain cli#    Can try to refer    ----- Message -----     From: Miki Solomon MA     Sent: 9/22/2017   2:18 PM       To: Miki Christianson MD  Subject: Pt requesting to be referred to pain clinic #    This patient called today asking if she could be referred to pain clinic in [place]. I think she had been referred to [place] but she has not transportation. She said she was trying to get a car and maybe she can go closer to [place]. Please advise. PKF.

## 2018-03-02 ENCOUNTER — OFFICE VISIT (OUTPATIENT)
Dept: FAMILY MEDICINE CLINIC | Facility: CLINIC | Age: 53
End: 2018-03-02

## 2018-03-02 VITALS
SYSTOLIC BLOOD PRESSURE: 120 MMHG | BODY MASS INDEX: 28.32 KG/M2 | TEMPERATURE: 98.2 F | OXYGEN SATURATION: 99 % | HEIGHT: 65 IN | HEART RATE: 100 BPM | DIASTOLIC BLOOD PRESSURE: 70 MMHG | WEIGHT: 170 LBS | RESPIRATION RATE: 12 BRPM

## 2018-03-02 DIAGNOSIS — E11.42 TYPE 2 DIABETES MELLITUS WITH PERIPHERAL NEUROPATHY (HCC): ICD-10-CM

## 2018-03-02 DIAGNOSIS — R11.0 NAUSEA: ICD-10-CM

## 2018-03-02 DIAGNOSIS — G89.4 CHRONIC PAIN SYNDROME: ICD-10-CM

## 2018-03-02 DIAGNOSIS — F17.200 SMOKER: ICD-10-CM

## 2018-03-02 DIAGNOSIS — K21.9 GASTROESOPHAGEAL REFLUX DISEASE WITHOUT ESOPHAGITIS: ICD-10-CM

## 2018-03-02 DIAGNOSIS — I10 ESSENTIAL HYPERTENSION: ICD-10-CM

## 2018-03-02 DIAGNOSIS — G89.29 CHRONIC NECK PAIN: ICD-10-CM

## 2018-03-02 DIAGNOSIS — M54.2 CHRONIC NECK PAIN: ICD-10-CM

## 2018-03-02 DIAGNOSIS — F33.41 RECURRENT MAJOR DEPRESSIVE DISORDER, IN PARTIAL REMISSION (HCC): ICD-10-CM

## 2018-03-02 DIAGNOSIS — Z00.00 HEALTHCARE MAINTENANCE: ICD-10-CM

## 2018-03-02 DIAGNOSIS — B37.31 CANDIDAL VAGINITIS: ICD-10-CM

## 2018-03-02 DIAGNOSIS — E78.2 MIXED HYPERLIPIDEMIA: Primary | ICD-10-CM

## 2018-03-02 LAB
ALBUMIN SERPL-MCNC: 4.4 G/DL (ref 3.5–5)
ALBUMIN/GLOB SERPL: 1.3 G/DL (ref 1.5–2.5)
ALP SERPL-CCNC: 210 U/L (ref 35–104)
ALT SERPL W P-5'-P-CCNC: 36 U/L (ref 10–36)
ANION GAP SERPL CALCULATED.3IONS-SCNC: 8.4 MMOL/L (ref 3.6–11.2)
AST SERPL-CCNC: 34 U/L (ref 10–30)
BASOPHILS # BLD AUTO: 0.09 10*3/MM3 (ref 0–0.3)
BASOPHILS NFR BLD AUTO: 0.7 % (ref 0–2)
BILIRUB SERPL-MCNC: 0.6 MG/DL (ref 0.2–1.8)
BUN BLD-MCNC: 13 MG/DL (ref 7–21)
BUN/CREAT SERPL: 18.6 (ref 7–25)
CALCIUM SPEC-SCNC: 9.4 MG/DL (ref 7.7–10)
CHLORIDE SERPL-SCNC: 99 MMOL/L (ref 99–112)
CHOLEST SERPL-MCNC: 209 MG/DL (ref 0–200)
CO2 SERPL-SCNC: 28.6 MMOL/L (ref 24.3–31.9)
CREAT BLD-MCNC: 0.7 MG/DL (ref 0.43–1.29)
DEPRECATED RDW RBC AUTO: 41.1 FL (ref 37–54)
EOSINOPHIL # BLD AUTO: 0.43 10*3/MM3 (ref 0–0.7)
EOSINOPHIL NFR BLD AUTO: 3.4 % (ref 0–5)
ERYTHROCYTE [DISTWIDTH] IN BLOOD BY AUTOMATED COUNT: 13.2 % (ref 11.5–14.5)
GFR SERPL CREATININE-BSD FRML MDRD: 88 ML/MIN/1.73
GLOBULIN UR ELPH-MCNC: 3.3 GM/DL
GLUCOSE BLD-MCNC: 205 MG/DL (ref 70–110)
HBA1C MFR BLD: 10.3 % (ref 4.5–5.7)
HCT VFR BLD AUTO: 43 % (ref 37–47)
HDLC SERPL-MCNC: 41 MG/DL (ref 60–100)
HGB BLD-MCNC: 14.5 G/DL (ref 12–16)
IMM GRANULOCYTES # BLD: 0.04 10*3/MM3 (ref 0–0.03)
IMM GRANULOCYTES NFR BLD: 0.3 % (ref 0–0.5)
LDLC SERPL CALC-MCNC: 128 MG/DL (ref 0–100)
LDLC/HDLC SERPL: 3.12 {RATIO}
LYMPHOCYTES # BLD AUTO: 3.57 10*3/MM3 (ref 1–3)
LYMPHOCYTES NFR BLD AUTO: 28.5 % (ref 21–51)
MCH RBC QN AUTO: 29.4 PG (ref 27–33)
MCHC RBC AUTO-ENTMCNC: 33.7 G/DL (ref 33–37)
MCV RBC AUTO: 87 FL (ref 80–94)
MONOCYTES # BLD AUTO: 1.08 10*3/MM3 (ref 0.1–0.9)
MONOCYTES NFR BLD AUTO: 8.6 % (ref 0–10)
NEUTROPHILS # BLD AUTO: 7.33 10*3/MM3 (ref 1.4–6.5)
NEUTROPHILS NFR BLD AUTO: 58.5 % (ref 30–70)
OSMOLALITY SERPL CALC.SUM OF ELEC: 278 MOSM/KG (ref 273–305)
PLATELET # BLD AUTO: 343 10*3/MM3 (ref 130–400)
PMV BLD AUTO: 11.3 FL (ref 6–10)
POTASSIUM BLD-SCNC: 3.2 MMOL/L (ref 3.5–5.3)
PROT SERPL-MCNC: 7.7 G/DL (ref 6–8)
RBC # BLD AUTO: 4.94 10*6/MM3 (ref 4.2–5.4)
SODIUM BLD-SCNC: 136 MMOL/L (ref 135–153)
TRIGL SERPL-MCNC: 200 MG/DL (ref 0–150)
TSH SERPL DL<=0.05 MIU/L-ACNC: 1.01 MIU/ML (ref 0.55–4.78)
VLDLC SERPL-MCNC: 40 MG/DL
WBC NRBC COR # BLD: 12.54 10*3/MM3 (ref 4.5–12.5)

## 2018-03-02 PROCEDURE — 85025 COMPLETE CBC W/AUTO DIFF WBC: CPT | Performed by: GENERAL PRACTICE

## 2018-03-02 PROCEDURE — 84443 ASSAY THYROID STIM HORMONE: CPT | Performed by: GENERAL PRACTICE

## 2018-03-02 PROCEDURE — 80061 LIPID PANEL: CPT | Performed by: GENERAL PRACTICE

## 2018-03-02 PROCEDURE — 80053 COMPREHEN METABOLIC PANEL: CPT | Performed by: GENERAL PRACTICE

## 2018-03-02 PROCEDURE — 83036 HEMOGLOBIN GLYCOSYLATED A1C: CPT | Performed by: GENERAL PRACTICE

## 2018-03-02 PROCEDURE — 99214 OFFICE O/P EST MOD 30 MIN: CPT | Performed by: GENERAL PRACTICE

## 2018-03-02 PROCEDURE — 36415 COLL VENOUS BLD VENIPUNCTURE: CPT | Performed by: GENERAL PRACTICE

## 2018-03-02 RX ORDER — ATORVASTATIN CALCIUM 80 MG/1
80 TABLET, FILM COATED ORAL NIGHTLY
Qty: 30 TABLET | Refills: 5 | Status: SHIPPED | OUTPATIENT
Start: 2018-03-02 | End: 2018-12-04

## 2018-03-02 RX ORDER — PANTOPRAZOLE SODIUM 40 MG/1
40 TABLET, DELAYED RELEASE ORAL DAILY
Qty: 30 TABLET | Refills: 5 | Status: SHIPPED | OUTPATIENT
Start: 2018-03-02 | End: 2018-08-01 | Stop reason: SDUPTHER

## 2018-03-02 RX ORDER — PROMETHAZINE HYDROCHLORIDE 25 MG/1
25 TABLET ORAL EVERY 6 HOURS PRN
Qty: 60 TABLET | Refills: 0 | Status: SHIPPED | OUTPATIENT
Start: 2018-03-02 | End: 2018-11-28 | Stop reason: SDUPTHER

## 2018-03-02 RX ORDER — FENOFIBRATE 145 MG/1
145 TABLET, COATED ORAL DAILY
Qty: 30 TABLET | Refills: 5 | Status: SHIPPED | OUTPATIENT
Start: 2018-03-02 | End: 2018-12-04

## 2018-03-02 RX ORDER — OMEGA-3 FATTY ACIDS/FISH OIL 300-1000MG
2000 CAPSULE ORAL 2 TIMES DAILY
Qty: 120 EACH | Refills: 5 | Status: SHIPPED | OUTPATIENT
Start: 2018-03-02 | End: 2020-07-02 | Stop reason: SDUPTHER

## 2018-03-02 RX ORDER — LISINOPRIL AND HYDROCHLOROTHIAZIDE 25; 20 MG/1; MG/1
1 TABLET ORAL EVERY MORNING
Qty: 30 TABLET | Refills: 5 | Status: SHIPPED | OUTPATIENT
Start: 2018-03-02 | End: 2018-12-04

## 2018-03-02 RX ORDER — FLUCONAZOLE 150 MG/1
150 TABLET ORAL ONCE
Qty: 1 TABLET | Refills: 2 | Status: SHIPPED | OUTPATIENT
Start: 2018-03-02 | End: 2019-07-22 | Stop reason: SDUPTHER

## 2018-03-02 NOTE — PROGRESS NOTES
Subjective   Jocelyn Palomo is a 52 y.o. female.     History of Present Illness     Diabetes  Current symptoms include paresthesia of the feet. Patient denies visual disturbances, polydipsia, polyuria, hypoglycemia and foot ulcerations. Evaluation to date has been: hemoglobin A1C. Home sugars: BGs consistently in an acceptable range. Current treatments: basal insulin - levemir 40 qd and mealtime insulin - novolog 6 with meals. Last dilated eye exam more then 4 years ago.  She has had no recent labs    Dyslipidemia  Compliance with treatment has been fair. The patient exercises occasionally. She is currently being prescribed the following medication for her dyslipidemia - atorvastatin, omega 3 fatty acids. Patient denies side effects associated with her medications.     Hypertension  Home blood pressure readings: not doing. Associated signs and symptoms: none. Patient denies: chest pain, palpitations, dyspnea, orthopnea, paroxysmal nocturnal dyspnea and peripheral edema. Current antihypertensive medications includes lisinopril and hydrochlorothiazide. Medication compliance: taking as prescribed.     Depression  Onset was many years ago. Symptoms have remained stable since last here. Current symptoms include: depressed mood, difficulty concentrating and fatigue. Patient denies anhedonia, recurrent thoughts of death and suicidal thoughts. Risk factors: previous episode of depression and significant family stressors. Treatment has included psychotherapy and medication sertraline. She complains of the following side effects from the treatment: none.  She continues to feel that she is coping    Chronic Pain Syndrome  She has chronic low back and joint pain as well as burning and tingling about both feet.  Since last here she has had neck pain radiating to both posterior shoulders and upper arms.  There is no history of any strain or trauma nor any change in her activities.  There is no history of any burning or tingling of  her arms and she denies any changes in her strength, gait, or bowel/bladder control.  She would like referral to pain management.  Urine drug screen performed at her last visit was positive for methamphetamine.  She states she cannot explain this    The following portions of the patient's history were reviewed and updated as appropriate: allergies, current medications, past medical history, past social history and problem list.    Review of Systems   Constitutional: Positive for fatigue. Negative for appetite change, chills, fever and unexpected weight change.   HENT: Negative for congestion, ear pain, rhinorrhea, sneezing, sore throat and voice change.    Eyes: Negative for visual disturbance.   Respiratory: Negative for cough, shortness of breath and wheezing.    Cardiovascular: Negative for chest pain, palpitations and leg swelling.   Gastrointestinal: Positive for nausea (intermittent). Negative for abdominal pain, blood in stool, constipation, diarrhea and vomiting.   Endocrine: Negative for polydipsia.   Genitourinary: Negative for difficulty urinating, dysuria, frequency, hematuria, menstrual problem, pelvic pain, urgency, vaginal bleeding, vaginal discharge and vaginal pain.        Vaginal pruritus   Musculoskeletal: Positive for arthralgias, back pain and neck pain. Negative for joint swelling and myalgias.   Skin: Negative for color change.   Neurological: Positive for numbness. Negative for tremors, weakness and headaches.   Psychiatric/Behavioral: Positive for decreased concentration and sleep disturbance. Negative for dysphoric mood and suicidal ideas. The patient is not nervous/anxious.      Objective   Physical Exam   Constitutional: She is oriented to person, place, and time. No distress.   Alert and fully oriented. No apparent distress. No pallor, jaundice, diaphoresis, or cyanosis.     HENT:   Head: Atraumatic.   Right Ear: Tympanic membrane, external ear and ear canal normal.   Left Ear: Tympanic  membrane, external ear and ear canal normal.   Nose: Nose normal.   Mouth/Throat: Oropharynx is clear and moist. Mucous membranes are not pale and not cyanotic.   Eyes: EOM are normal. Pupils are equal, round, and reactive to light. No scleral icterus.   Neck: No JVD present. Carotid bruit is not present. No tracheal deviation present. No thyromegaly present.   Cardiovascular: Normal rate, regular rhythm, S1 normal, S2 normal and intact distal pulses.  Exam reveals no gallop, no S3 and no S4.    No murmur heard.  Pulmonary/Chest: Breath sounds normal. No stridor. No respiratory distress.   Abdominal: Soft. Normal aorta and bowel sounds are normal. She exhibits no distension, no abdominal bruit and no mass. There is no hepatosplenomegaly. There is no tenderness. No hernia.   Musculoskeletal: She exhibits no deformity.        Cervical back: She exhibits decreased range of motion and tenderness (bilateral cervical paraspinal muscle tenderness). She exhibits no bony tenderness and no deformity.   Negative straight leg raise.  No peripheral joint redness or warmth       Vascular Status -  Her exam exhibits no right foot edema. Her exam exhibits no left foot edema.  Lymphadenopathy:        Head (right side): No submandibular adenopathy present.        Head (left side): No submandibular adenopathy present.     She has no cervical adenopathy.   Neurological: She is alert and oriented to person, place, and time. She has normal strength and normal reflexes. She displays normal reflexes. A sensory deficit (decreased vibration sense both feet) is present. No cranial nerve deficit. She exhibits normal muscle tone. Coordination normal.   Reflex Scores:       Tricep reflexes are 2+ on the right side and 2+ on the left side.       Bicep reflexes are 2+ on the right side and 2+ on the left side.       Brachioradialis reflexes are 2+ on the right side and 2+ on the left side.       Patellar reflexes are 2+ on the right side and 2+ on  the left side.       Achilles reflexes are 2+ on the right side and 2+ on the left side.  Skin: Skin is warm and dry. No rash noted. She is not diaphoretic. No cyanosis. No pallor. Nails show no clubbing.   Psychiatric: Her speech is normal and behavior is normal. Thought content normal. Her affect is blunt (somewhat). Cognition and memory are normal.     Assessment/Plan   Problems Addressed this Visit        Cardiovascular and Mediastinum    Mixed hyperlipidemia   Encouraged to continue to work on her diet and exercise plan.  Continue current medication  Updated labs drawn     Relevant Medications    fenofibrate (TRICOR) 145 MG tablet    atorvastatin (LIPITOR) 80 MG tablet    Other Relevant Orders    Lipid Panel (Completed)    TSH (Completed)    Essential hypertension  As above.   Continue current medication.    Relevant Medications    lisinopril-hydrochlorothiazide (PRINZIDE,ZESTORETIC) 20-25 MG per tablet    Other Relevant Orders    CBC & Differential (Completed)    Comprehensive Metabolic Panel (Completed)    CBC Auto Differential (Completed)    Osmolality, Calculated (Completed)       Digestive    Gastroesophageal reflux disease without esophagitis    Relevant Medications    pantoprazole (PROTONIX) 40 MG EC tablet    Nausea    Relevant Medications    promethazine (PHENERGAN) 25 MG tablet       Endocrine    Type 2 diabetes mellitus with peripheral neuropathy  Diabetes mellitus Type II, under unknown control.   Encouraged to continue to pursue ADA diet  Encouraged aerobic exercise.  Reminded to get yearly retinal exam.    Relevant Medications    Omega 3 1000 MG capsule    Insulin Pen Needle 32G X 4 MM misc    insulin detemir (LEVEMIR) 100 UNIT/ML injection    insulin aspart (novoLOG FLEXPEN) 100 UNIT/ML solution pen-injector sc pen    Other Relevant Orders    Hemoglobin A1c (Completed)       Nervous and Auditory    Chronic neck pain  Advised regarding rest, gentle exercise, ice and heat.  Imaging will be arranged  with likely referral to pain management afterward     Relevant Orders    MRI Cervical Spine With Contrast       Genitourinary    Candidal vaginitis    Relevant Medications    fluconazole (DIFLUCAN) 150 MG tablet       Other    Recurrent major depressive disorder, in partial remission  Stable.  Supportive therapy.   Continue current medication.    Chronic pain syndrome  As above.    Smoker    Healthcare maintenance  Patient remains uninterested in breast, cervical, or colon cancer screening

## 2018-03-03 DIAGNOSIS — E87.6 HYPOKALEMIA: Primary | ICD-10-CM

## 2018-03-03 RX ORDER — POTASSIUM CHLORIDE 750 MG/1
10 TABLET, EXTENDED RELEASE ORAL DAILY
Qty: 30 TABLET | Refills: 5 | Status: SHIPPED | OUTPATIENT
Start: 2018-03-03 | End: 2020-03-24 | Stop reason: SDUPTHER

## 2018-03-06 NOTE — PROGRESS NOTES
Made pt aware of the following per Dr. Christianson.     -- Needs to start on Kcl 10 meq daily - ashu emailed a script to her pharm   She should have a BMP drawn in 2 weeks - ashu placed the order in epic

## 2018-03-27 ENCOUNTER — TELEPHONE (OUTPATIENT)
Dept: FAMILY MEDICINE CLINIC | Facility: CLINIC | Age: 53
End: 2018-03-27

## 2018-03-27 DIAGNOSIS — G89.4 CHRONIC PAIN SYNDROME: ICD-10-CM

## 2018-03-27 DIAGNOSIS — M54.2 CHRONIC NECK PAIN: Primary | ICD-10-CM

## 2018-03-27 DIAGNOSIS — G89.29 CHRONIC NECK PAIN: Primary | ICD-10-CM

## 2018-03-27 NOTE — TELEPHONE ENCOUNTER
----- Message from Miki Christianson MD sent at 3/25/2018  5:13 PM EDT -----  Yes - problem is getting a MRI however - her insurance has already denied one of her neck    ----- Message -----  From: Mary Liao MA  Sent: 3/22/2018   1:54 PM  To: Miki Christianson MD    Patient wants to know if she could get a referral to the comprehensive pain specialist.

## 2018-08-01 DIAGNOSIS — K21.9 GASTROESOPHAGEAL REFLUX DISEASE WITHOUT ESOPHAGITIS: ICD-10-CM

## 2018-08-01 RX ORDER — PANTOPRAZOLE SODIUM 40 MG/1
TABLET, DELAYED RELEASE ORAL
Qty: 30 TABLET | Refills: 5 | Status: SHIPPED | OUTPATIENT
Start: 2018-08-01 | End: 2019-01-28 | Stop reason: SDUPTHER

## 2018-09-25 ENCOUNTER — TRANSCRIBE ORDERS (OUTPATIENT)
Dept: ADMINISTRATIVE | Facility: HOSPITAL | Age: 53
End: 2018-09-25

## 2018-10-02 ENCOUNTER — TELEPHONE (OUTPATIENT)
Dept: FAMILY MEDICINE CLINIC | Facility: CLINIC | Age: 53
End: 2018-10-02

## 2018-10-02 DIAGNOSIS — G89.4 CHRONIC PAIN SYNDROME: Primary | ICD-10-CM

## 2018-10-02 NOTE — TELEPHONE ENCOUNTER
----- Message from Miki Christianson MD sent at 10/2/2018  1:49 PM EDT -----  Sure    ----- Message -----  From: Mary Liao MA  Sent: 10/2/2018   1:34 PM  To: Miki Christianson MD    Patient stated that she needed a new referral to the pain clinic that opened back up in [place]. It's [facility]. Can we place a referral for her?

## 2018-11-19 ENCOUNTER — TRANSITIONAL CARE MANAGEMENT TELEPHONE ENCOUNTER (OUTPATIENT)
Dept: FAMILY MEDICINE CLINIC | Facility: CLINIC | Age: 53
End: 2018-11-19

## 2018-11-19 NOTE — OUTREACH NOTE
"HANNAH call completed.  Please refer to TCM call flowsheet for call documentation.  Please see note below and please call patient at [phone].    The patient states she is not feeling well today. Chief complaint r/t left foot pain pain with \"pain shooting through it.\" Pt states she has a msg into her pain management and is also attempting to get pain management appt tomorrow. The pt also c/o abd swelling which she states was present during admission, sx's no worse but still present per pt. She also c/o blurry vision that began 11/15 during admission. Pt states she did not mention the blurred vision to her care team during admission. Blurred vision has not worsened post-discharge. The pt also admits to \"a couple of panic attacks\" post-discahrge but states she was able to work through them with deep breathing exercises. She is eating and drinking well.  She is urinating well and having regular bowel movements. She has family with her and assisting in her care needs. She is using a walker and wheelchair at home. The pt denies any fever, SOA, chest pain, dizziness, n/v/d, dysphagia, slurred speech, syncope, headaches, or wound drainage, bleeding, or foul odors. She was sched to f/u with Rashmi Mahan 11/27/18 however asked to cancel appt. She requests appt with Dr. Christianson or one of his partners week of 12/3/18 and would like call back with appt info. She is f/u with Dr. roberts podiatry 11/28. The pt states she has not yet heard from  care and she plans to notify PCP office if she does not hear from them today. She denies any other questions, concerns, or needs at time of call. Encouraged ED eval should sx's persist and/or worsen and she verbalized understanding.       "

## 2018-11-28 DIAGNOSIS — R11.0 NAUSEA: ICD-10-CM

## 2018-11-28 RX ORDER — PROMETHAZINE HYDROCHLORIDE 25 MG/1
TABLET ORAL
Qty: 60 TABLET | Refills: 0 | Status: SHIPPED | OUTPATIENT
Start: 2018-11-28 | End: 2019-01-28 | Stop reason: SDUPTHER

## 2018-12-04 ENCOUNTER — OFFICE VISIT (OUTPATIENT)
Dept: FAMILY MEDICINE CLINIC | Facility: CLINIC | Age: 53
End: 2018-12-04

## 2018-12-04 DIAGNOSIS — Z89.432 HISTORY OF TRANSMETATARSAL AMPUTATION OF LEFT FOOT (HCC): ICD-10-CM

## 2018-12-04 DIAGNOSIS — F33.41 RECURRENT MAJOR DEPRESSIVE DISORDER, IN PARTIAL REMISSION (HCC): ICD-10-CM

## 2018-12-04 DIAGNOSIS — Z23 ENCOUNTER FOR IMMUNIZATION: ICD-10-CM

## 2018-12-04 DIAGNOSIS — I10 ESSENTIAL HYPERTENSION: ICD-10-CM

## 2018-12-04 DIAGNOSIS — F17.200 SMOKER: ICD-10-CM

## 2018-12-04 DIAGNOSIS — E11.42 TYPE 2 DIABETES MELLITUS WITH PERIPHERAL NEUROPATHY (HCC): ICD-10-CM

## 2018-12-04 DIAGNOSIS — L08.9 DIABETIC FOOT INFECTION (HCC): ICD-10-CM

## 2018-12-04 DIAGNOSIS — E11.628 DIABETIC FOOT INFECTION (HCC): ICD-10-CM

## 2018-12-04 DIAGNOSIS — E78.2 MIXED HYPERLIPIDEMIA: Primary | ICD-10-CM

## 2018-12-04 DIAGNOSIS — K21.9 GASTROESOPHAGEAL REFLUX DISEASE WITHOUT ESOPHAGITIS: ICD-10-CM

## 2018-12-04 DIAGNOSIS — N28.9 RENAL INSUFFICIENCY: ICD-10-CM

## 2018-12-04 DIAGNOSIS — G89.4 CHRONIC PAIN SYNDROME: ICD-10-CM

## 2018-12-04 DIAGNOSIS — Z00.00 HEALTHCARE MAINTENANCE: ICD-10-CM

## 2018-12-04 PROBLEM — B37.31 CANDIDAL VAGINITIS: Status: RESOLVED | Noted: 2018-03-02 | Resolved: 2018-12-04

## 2018-12-04 PROBLEM — R11.0 NAUSEA: Status: RESOLVED | Noted: 2018-03-02 | Resolved: 2018-12-04

## 2018-12-04 PROCEDURE — 99214 OFFICE O/P EST MOD 30 MIN: CPT | Performed by: GENERAL PRACTICE

## 2018-12-04 PROCEDURE — 90686 IIV4 VACC NO PRSV 0.5 ML IM: CPT | Performed by: GENERAL PRACTICE

## 2018-12-04 PROCEDURE — 90471 IMMUNIZATION ADMIN: CPT | Performed by: GENERAL PRACTICE

## 2018-12-04 RX ORDER — ROSUVASTATIN CALCIUM 40 MG/1
40 TABLET, COATED ORAL NIGHTLY
Qty: 30 TABLET | Refills: 5 | Status: SHIPPED | OUTPATIENT
Start: 2018-12-04 | End: 2020-03-12 | Stop reason: SDUPTHER

## 2018-12-04 RX ORDER — LINEZOLID 600 MG/1
600 TABLET, FILM COATED ORAL 2 TIMES DAILY
COMMUNITY
End: 2020-03-12

## 2018-12-04 RX ORDER — GABAPENTIN 300 MG/1
800 CAPSULE ORAL 3 TIMES DAILY
COMMUNITY
End: 2021-03-10

## 2018-12-04 RX ORDER — ACETAMINOPHEN 325 MG/1
650 TABLET ORAL AS NEEDED
COMMUNITY

## 2018-12-04 RX ORDER — OXYCODONE HYDROCHLORIDE 15 MG/1
15 TABLET ORAL EVERY 4 HOURS PRN
COMMUNITY
End: 2022-01-17

## 2018-12-04 NOTE — PROGRESS NOTES
Subjective   Jocelyn Palomo is a 53 y.o. female.     History of Present Illness     Hospital Follow-up  Admitted to [facility] and then transferred to [facility] with a left diabetic foot infection since last here. Course was complicated by acute renal failure. Required a transmetatarsal amputation. She remains on linezolid. Underwent a podiatry reassessment with Dr Archer last week and will see him again on 12/11/18. She was never set up with home health but feels that she is managing with the help of her son. She has been advised non-weight bearing but admits that she has been ambulating some on her heels with her walker. She denies any significant pain. The drainage is gradually improving. She admits to occasional nausea. She denies any vomiting or diarrhea and has had no chest pain, shortness of breath, cough, dysuria, calf pain, fever or chills. She has had no labs since her return home    Diabetes  Current symptoms include paresthesia of the feet and intermittent visual blurring. Patient denies visual loss, polydipsia, polyuria, or hypoglycemia. Evaluation to date has been: hemoglobin A1C. Current treatments: basal insulin - levemir 40 qd and mealtime insulin - novolog 10 with meals. Last dilated eye exam more then 4 years ago.     Dyslipidemia  Compliance with treatment has been fair. The patient exercises occasionally. She is currently taking the following medication for her dyslipidemia - none. Patient experienced muscle spasms with atorvastatin and fenofibrate.     Hypertension  Home blood pressure readings: not doing. Associated signs and symptoms: none. Patient denies: chest pain, palpitations, dyspnea, orthopnea, paroxysmal nocturnal dyspnea and peripheral edema. Current antihypertensive medications includes lisinopril and hydrochlorothiazide. Medication compliance: taking as prescribed.     Depression  Onset was many years ago. Symptoms have remained stable since last here. Current symptoms include:  depressed mood, difficulty concentrating and fatigue. Patient denies anhedonia, recurrent thoughts of death and suicidal thoughts. Risk factors: previous episode of depression and significant family stressors. Treatment has included psychotherapy and she as previously prescribed sertraline. She continues to feel that she is coping    Chronic Pain Syndrome  She has chronic low back and joint pain as well as burning and tingling about both feet. She denies any changes in her strength, gait, or bowel/bladder control.  She has been scheduled to undergo a pain management assessment with Dr Andrade on 12/19/18    The following portions of the patient's history were reviewed and updated as appropriate: allergies, current medications, past family history, past medical history, past social history, past surgical history and problem list.    Review of Systems   Constitutional: Positive for fatigue. Negative for appetite change, chills, fever and unexpected weight change.   HENT: Negative for congestion, ear pain, rhinorrhea, sneezing, sore throat and voice change.    Eyes: Negative for visual disturbance.   Respiratory: Negative for cough, shortness of breath and wheezing.    Cardiovascular: Negative for chest pain, palpitations and leg swelling.   Gastrointestinal: Positive for nausea (intermittent). Negative for abdominal pain, blood in stool, constipation, diarrhea and vomiting.   Endocrine: Negative for polydipsia.   Genitourinary: Negative for difficulty urinating, dysuria, frequency, hematuria, menstrual problem, pelvic pain, urgency, vaginal bleeding, vaginal discharge and vaginal pain.   Musculoskeletal: Positive for arthralgias, back pain and neck pain. Negative for joint swelling and myalgias.   Skin: Positive for wound (left foot). Negative for color change.   Neurological: Positive for numbness. Negative for tremors, weakness and headaches.   Psychiatric/Behavioral: Positive for decreased concentration and sleep  disturbance. Negative for dysphoric mood and suicidal ideas. The patient is not nervous/anxious.      Objective   Physical Exam   Constitutional: She is oriented to person, place, and time. She appears well-developed and well-nourished.   Bright and in good spirits. Sitting in wheelchair. Let foot dressed. No apparent distress. No pallor, jaundice, diaphoresis, or cyanosis.     HENT:   Head: Normocephalic and atraumatic.   Nose: Nose normal.   Mouth/Throat: Oropharynx is clear and moist.   Eyes: Conjunctivae and EOM are normal. Pupils are equal, round, and reactive to light.   Neck: Normal range of motion. Neck supple. No tracheal deviation present. No thyromegaly present.   Cardiovascular: Normal rate, regular rhythm, normal heart sounds and intact distal pulses.   No murmur heard.  Pulmonary/Chest: Effort normal and breath sounds normal. No respiratory distress. She has no wheezes.   Abdominal: Soft. Bowel sounds are normal. There is no tenderness. There is no guarding.   Musculoskeletal: Normal range of motion. She exhibits no edema or tenderness.     Vascular Status -  Her right foot exhibits no edema. Her left foot exhibits no edema.  Lymphadenopathy:     She has no cervical adenopathy.   Neurological: She is alert and oriented to person, place, and time. She has normal strength. A sensory deficit (decreased vibration sense right foot) is present. No cranial nerve deficit. Coordination normal.   Skin: Skin is warm and dry. No rash noted.   Psychiatric: She has a normal mood and affect. Her behavior is normal.   Nursing note and vitals reviewed.    Assessment/Plan   Problems Addressed this Visit        Cardiovascular and Mediastinum    Mixed hyperlipidemia   Encouraged to continue to work on her diet and exercise plan.  Reminded of the potential benefits of lipid lowering therapy. Agreed on a trial of rosuvastatin. Patient will report if she should experience any side effects    Relevant Medications     rosuvastatin (CRESTOR) 40 MG tablet    Other Relevant Orders    Lipid Panel    TSH    Essential hypertension  As above.   Continue current medication.    Relevant Orders    CBC & Differential    Comprehensive Metabolic Panel       Digestive    Gastroesophageal reflux disease without esophagitis       Endocrine    Type 2 diabetes mellitus with peripheral neuropathy (CMS/HCC)  Diabetes mellitus Type II, under poor control.  Diabetes related complications: peripheral neuropathy and left diabetic foot infection requiring transmetatarsal amputation   Encouraged to continue to pursue ADA diet  Encouraged aerobic exercise.  Instructed to bring her log with her at her return  Reminded to follow up with podiatry  Will arrange a RASHAWN    Relevant Orders    Ambulatory Referral for Diabetic Eye Exam-Optometry    Hemoglobin A1c    MicroAlbumin, Urine, Random - Urine, Clean Catch    Diabetic foot infection (CMS/HCC)  As above.        Nervous and Auditory    Chronic pain syndrome  Follow up with pain management       Genitourinary    Renal insufficiency  Advised to avoid any NSAIDs prescription or OTC  Updated labs arranged for tomorrow or the next day       Other    Recurrent major depressive disorder, in partial remission (CMS/HCC)  Significant situational component.   Supportive therapy.     Smoker    Encounter for immunization    Relevant Orders    Fluarix/Fluzone/Afluria Quad>6 Months (Completed)    Healthcare maintenance  Recommended a flu shot    Relevant Orders    Fluarix/Fluzone/Afluria Quad>6 Months (Completed)    History of transmetatarsal amputation of left foot (CMS/HCC)  As above.

## 2018-12-05 VITALS
HEART RATE: 103 BPM | SYSTOLIC BLOOD PRESSURE: 130 MMHG | BODY MASS INDEX: 27.49 KG/M2 | TEMPERATURE: 97.9 F | DIASTOLIC BLOOD PRESSURE: 80 MMHG | RESPIRATION RATE: 12 BRPM | OXYGEN SATURATION: 95 % | HEIGHT: 65 IN | WEIGHT: 165 LBS

## 2018-12-05 PROBLEM — M54.2 CHRONIC NECK PAIN: Status: RESOLVED | Noted: 2018-03-02 | Resolved: 2018-12-05

## 2018-12-05 PROBLEM — G89.29 CHRONIC NECK PAIN: Status: RESOLVED | Noted: 2018-03-02 | Resolved: 2018-12-05

## 2018-12-19 ENCOUNTER — TELEPHONE (OUTPATIENT)
Dept: FAMILY MEDICINE CLINIC | Facility: CLINIC | Age: 53
End: 2018-12-19

## 2018-12-19 NOTE — TELEPHONE ENCOUNTER
Ordered and faxed to pharmacy      ----- Message from Elizabeth Peng MA sent at 12/19/2018 10:39 AM EST -----  Regarding: glucose meter  Patient needs new meter and supplies

## 2019-01-28 DIAGNOSIS — R11.0 NAUSEA: ICD-10-CM

## 2019-01-28 DIAGNOSIS — K21.9 GASTROESOPHAGEAL REFLUX DISEASE WITHOUT ESOPHAGITIS: ICD-10-CM

## 2019-01-28 RX ORDER — PANTOPRAZOLE SODIUM 40 MG/1
40 TABLET, DELAYED RELEASE ORAL DAILY
Qty: 30 TABLET | Refills: 5 | Status: SHIPPED | OUTPATIENT
Start: 2019-01-28 | End: 2019-07-11 | Stop reason: SDUPTHER

## 2019-01-28 RX ORDER — PROMETHAZINE HYDROCHLORIDE 25 MG/1
25 TABLET ORAL EVERY 6 HOURS PRN
Qty: 60 TABLET | Refills: 0 | Status: SHIPPED | OUTPATIENT
Start: 2019-01-28 | End: 2019-03-18 | Stop reason: SDUPTHER

## 2019-03-18 DIAGNOSIS — R11.0 NAUSEA: ICD-10-CM

## 2019-03-18 RX ORDER — PROMETHAZINE HYDROCHLORIDE 25 MG/1
TABLET ORAL
Qty: 60 TABLET | Refills: 0 | Status: SHIPPED | OUTPATIENT
Start: 2019-03-18 | End: 2019-03-21 | Stop reason: SDUPTHER

## 2019-03-21 DIAGNOSIS — R11.0 NAUSEA: ICD-10-CM

## 2019-03-22 RX ORDER — PROMETHAZINE HYDROCHLORIDE 25 MG/1
TABLET ORAL
Qty: 60 TABLET | Refills: 0 | Status: SHIPPED | OUTPATIENT
Start: 2019-03-22 | End: 2019-06-11 | Stop reason: SDUPTHER

## 2019-04-11 DIAGNOSIS — E11.42 TYPE 2 DIABETES MELLITUS WITH PERIPHERAL NEUROPATHY (HCC): ICD-10-CM

## 2019-04-11 RX ORDER — INSULIN DETEMIR 100 [IU]/ML
INJECTION, SOLUTION SUBCUTANEOUS
Qty: 15 ML | Refills: 2 | Status: SHIPPED | OUTPATIENT
Start: 2019-04-11 | End: 2020-03-05

## 2019-06-11 DIAGNOSIS — R11.0 NAUSEA: ICD-10-CM

## 2019-06-12 RX ORDER — PROMETHAZINE HYDROCHLORIDE 25 MG/1
25 TABLET ORAL EVERY 6 HOURS PRN
Qty: 60 TABLET | Refills: 0 | Status: SHIPPED | OUTPATIENT
Start: 2019-06-12 | End: 2019-09-26 | Stop reason: SDUPTHER

## 2019-07-11 DIAGNOSIS — K21.9 GASTROESOPHAGEAL REFLUX DISEASE WITHOUT ESOPHAGITIS: ICD-10-CM

## 2019-07-11 RX ORDER — PANTOPRAZOLE SODIUM 40 MG/1
40 TABLET, DELAYED RELEASE ORAL DAILY
Qty: 30 TABLET | Refills: 5 | Status: SHIPPED | OUTPATIENT
Start: 2019-07-11 | End: 2019-12-26 | Stop reason: SDUPTHER

## 2019-07-22 DIAGNOSIS — B37.31 CANDIDAL VAGINITIS: ICD-10-CM

## 2019-07-22 RX ORDER — FLUCONAZOLE 150 MG/1
TABLET ORAL
Qty: 1 TABLET | Refills: 0 | Status: SHIPPED | OUTPATIENT
Start: 2019-07-22 | End: 2019-12-03 | Stop reason: SDUPTHER

## 2019-08-07 ENCOUNTER — TELEPHONE (OUTPATIENT)
Dept: FAMILY MEDICINE CLINIC | Facility: CLINIC | Age: 54
End: 2019-08-07

## 2019-08-07 NOTE — TELEPHONE ENCOUNTER
Called Dr Christopher Kwan's office for most recent eye exam. They said 1/4/19. Records requested.

## 2019-09-19 ENCOUNTER — TELEPHONE (OUTPATIENT)
Dept: FAMILY MEDICINE CLINIC | Facility: CLINIC | Age: 54
End: 2019-09-19

## 2019-09-19 NOTE — TELEPHONE ENCOUNTER
----- Message from Miki Christianson MD sent at 2019 11:23 AM EDT -----  Will leave a note with Ana  ----- Message -----  From: Rosanna Collins MA  Sent: 2019  11:09 AM  To: Miki Christianson MD    Needs Jury Duty excuse ... Father just .

## 2019-09-26 DIAGNOSIS — R11.0 NAUSEA: ICD-10-CM

## 2019-09-26 RX ORDER — PROMETHAZINE HYDROCHLORIDE 25 MG/1
25 TABLET ORAL EVERY 6 HOURS PRN
Qty: 60 TABLET | Refills: 0 | Status: SHIPPED | OUTPATIENT
Start: 2019-09-26 | End: 2019-12-26 | Stop reason: SDUPTHER

## 2019-12-03 DIAGNOSIS — B37.31 CANDIDAL VAGINITIS: ICD-10-CM

## 2019-12-04 RX ORDER — FLUCONAZOLE 150 MG/1
TABLET ORAL
Qty: 1 TABLET | Refills: 0 | Status: SHIPPED | OUTPATIENT
Start: 2019-12-04 | End: 2020-03-12

## 2019-12-26 DIAGNOSIS — R11.0 NAUSEA: ICD-10-CM

## 2019-12-26 DIAGNOSIS — K21.9 GASTROESOPHAGEAL REFLUX DISEASE WITHOUT ESOPHAGITIS: ICD-10-CM

## 2019-12-26 RX ORDER — PROMETHAZINE HYDROCHLORIDE 25 MG/1
25 TABLET ORAL EVERY 6 HOURS PRN
Qty: 60 TABLET | Refills: 0 | Status: SHIPPED | OUTPATIENT
Start: 2019-12-26 | End: 2020-03-12 | Stop reason: SDUPTHER

## 2019-12-26 RX ORDER — PANTOPRAZOLE SODIUM 40 MG/1
40 TABLET, DELAYED RELEASE ORAL DAILY
Qty: 30 TABLET | Refills: 5 | Status: SHIPPED | OUTPATIENT
Start: 2019-12-26 | End: 2020-03-12 | Stop reason: SDUPTHER

## 2020-01-29 ENCOUNTER — TRANSITIONAL CARE MANAGEMENT TELEPHONE ENCOUNTER (OUTPATIENT)
Dept: FAMILY MEDICINE CLINIC | Facility: CLINIC | Age: 55
End: 2020-01-29

## 2020-01-31 NOTE — OUTREACH NOTE
Unable to connect with pt x 3 attempts to complete TCM Encounter. Pt is sched for [facility]CM [facility] with Nathaly LANDON on 02/04/20.

## 2020-02-04 ENCOUNTER — TELEPHONE (OUTPATIENT)
Dept: FAMILY MEDICINE CLINIC | Facility: CLINIC | Age: 55
End: 2020-02-04

## 2020-02-04 NOTE — TELEPHONE ENCOUNTER
----- Message from Hollis Pacheco sent at 2/4/2020 10:20 AM EST -----  Called all numbers on profile her sister Mary said she would send her facebook message to make appt.   ----- Message -----  From: Mary Liao MA  Sent: 2/4/2020   8:30 AM EST  To: Hollis Pacheco    Could you call her please?   ----- Message -----  From: Miki Christianson MD  Sent: 2/3/2020   7:27 PM EST  To: Mary Liao MA    Will have to be seen by one of us    ----- Message -----  From: Mary Liao MA  Sent: 2/3/2020   4:22 PM EST  To: Miki Christianson MD    Patient called in and stated that she was discharged from [facility] last week. She was sent home with doxycycline for cellulitis on her legs. She said that the doxy has blistered her legs so she must be allergic to them. Her legs are still swelled up really bad and wants to know if you could send her in another antibiotics and lasix.

## 2020-03-05 DIAGNOSIS — E11.42 TYPE 2 DIABETES MELLITUS WITH PERIPHERAL NEUROPATHY (HCC): ICD-10-CM

## 2020-03-05 RX ORDER — INSULIN DETEMIR 100 [IU]/ML
INJECTION, SOLUTION SUBCUTANEOUS
Qty: 15 ML | Refills: 0 | Status: SHIPPED | OUTPATIENT
Start: 2020-03-05 | End: 2020-03-12 | Stop reason: SDUPTHER

## 2020-03-12 ENCOUNTER — TRANSITIONAL CARE MANAGEMENT TELEPHONE ENCOUNTER (OUTPATIENT)
Dept: CALL CENTER | Facility: HOSPITAL | Age: 55
End: 2020-03-12

## 2020-03-12 ENCOUNTER — READMISSION MANAGEMENT (OUTPATIENT)
Dept: CALL CENTER | Facility: HOSPITAL | Age: 55
End: 2020-03-12

## 2020-03-12 ENCOUNTER — OFFICE VISIT (OUTPATIENT)
Dept: FAMILY MEDICINE CLINIC | Facility: CLINIC | Age: 55
End: 2020-03-12

## 2020-03-12 VITALS
RESPIRATION RATE: 14 BRPM | HEIGHT: 65 IN | HEART RATE: 106 BPM | TEMPERATURE: 98.7 F | DIASTOLIC BLOOD PRESSURE: 88 MMHG | BODY MASS INDEX: 27.42 KG/M2 | WEIGHT: 164.6 LBS | OXYGEN SATURATION: 98 % | SYSTOLIC BLOOD PRESSURE: 140 MMHG

## 2020-03-12 DIAGNOSIS — I10 ESSENTIAL HYPERTENSION: Chronic | ICD-10-CM

## 2020-03-12 DIAGNOSIS — E78.2 MIXED HYPERLIPIDEMIA: Chronic | ICD-10-CM

## 2020-03-12 DIAGNOSIS — R11.0 NAUSEA: ICD-10-CM

## 2020-03-12 DIAGNOSIS — K21.9 GASTROESOPHAGEAL REFLUX DISEASE WITHOUT ESOPHAGITIS: Chronic | ICD-10-CM

## 2020-03-12 DIAGNOSIS — J01.00 ACUTE MAXILLARY SINUSITIS, RECURRENCE NOT SPECIFIED: ICD-10-CM

## 2020-03-12 DIAGNOSIS — Z09 HOSPITAL DISCHARGE FOLLOW-UP: ICD-10-CM

## 2020-03-12 DIAGNOSIS — E11.42 TYPE 2 DIABETES MELLITUS WITH PERIPHERAL NEUROPATHY (HCC): Primary | Chronic | ICD-10-CM

## 2020-03-12 DIAGNOSIS — Z89.432 PARTIAL NONTRAUMATIC AMPUTATION OF LEFT FOOT (HCC): ICD-10-CM

## 2020-03-12 PROCEDURE — 99214 OFFICE O/P EST MOD 30 MIN: CPT | Performed by: NURSE PRACTITIONER

## 2020-03-12 RX ORDER — LORAZEPAM 1 MG/1
TABLET ORAL
COMMUNITY
Start: 2020-03-05 | End: 2022-01-13

## 2020-03-12 RX ORDER — FLUCONAZOLE 150 MG/1
150 TABLET ORAL DAILY
Qty: 3 TABLET | Refills: 0 | Status: SHIPPED | OUTPATIENT
Start: 2020-03-12 | End: 2020-03-15

## 2020-03-12 RX ORDER — DIPHENHYDRAMINE HYDROCHLORIDE 25 MG/1
1 CAPSULE, LIQUID FILLED ORAL 4 TIMES DAILY
Qty: 1 EACH | Refills: 0 | Status: SHIPPED | OUTPATIENT
Start: 2020-03-12 | End: 2022-01-13 | Stop reason: SDUPTHER

## 2020-03-12 RX ORDER — LISINOPRIL AND HYDROCHLOROTHIAZIDE 25; 20 MG/1; MG/1
1 TABLET ORAL DAILY
Qty: 30 TABLET | Refills: 1 | Status: SHIPPED | OUTPATIENT
Start: 2020-03-12 | End: 2020-04-11

## 2020-03-12 RX ORDER — ROSUVASTATIN CALCIUM 40 MG/1
40 TABLET, COATED ORAL NIGHTLY
Qty: 30 TABLET | Refills: 1 | Status: SHIPPED | OUTPATIENT
Start: 2020-03-12 | End: 2020-07-02 | Stop reason: SDUPTHER

## 2020-03-12 RX ORDER — LISINOPRIL AND HYDROCHLOROTHIAZIDE 25; 20 MG/1; MG/1
1 TABLET ORAL DAILY
COMMUNITY
End: 2020-03-12 | Stop reason: SDUPTHER

## 2020-03-12 RX ORDER — PROMETHAZINE HYDROCHLORIDE 12.5 MG/1
12.5 TABLET ORAL EVERY 8 HOURS PRN
Qty: 30 TABLET | Refills: 1 | Status: SHIPPED | OUTPATIENT
Start: 2020-03-12 | End: 2020-04-02

## 2020-03-12 RX ORDER — PANTOPRAZOLE SODIUM 40 MG/1
40 TABLET, DELAYED RELEASE ORAL DAILY
Qty: 30 TABLET | Refills: 1 | Status: SHIPPED | OUTPATIENT
Start: 2020-03-12 | End: 2020-07-02 | Stop reason: SDUPTHER

## 2020-03-12 RX ORDER — AMOXICILLIN AND CLAVULANATE POTASSIUM 875; 125 MG/1; MG/1
1 TABLET, FILM COATED ORAL 2 TIMES DAILY
Qty: 20 TABLET | Refills: 0 | Status: SHIPPED | OUTPATIENT
Start: 2020-03-12 | End: 2020-03-22

## 2020-03-12 NOTE — OUTREACH NOTE
Prep Survey      Responses   [facility] facility patient discharged from?  Non-[facility]   Is LACE score < 7 ?  Non-BH Discharge   Eligibility  [facility]   Discharge diagnosis  unknown   Does the patient have one of the following disease processes/diagnoses(primary or secondary)?  Non-BH Discharge   Comments regarding appointments  has appointment 3/12 at 4:30pm   Prep survey completed?  Yes          Misty Seo RN

## 2020-03-12 NOTE — OUTREACH NOTE
Call Center TCM Note      Responses   [facility] patient discharged from?  Non-   Does the patient have one of the following disease processes/diagnoses(primary or secondary)?  Non- Discharge   TCM attempt successful?  Yes   Call start time  1058   Call end time  1105   Discharge diagnosis  unknown   Person spoke with today (if not patient) and relationship  Mary-   Meds reviewed with patient/caregiver?  Yes   Is the patient having any side effects they believe may be caused by any medication additions or changes?  No   Does the patient have all medications ordered at discharge?  N/A   Is the patient taking all medications as directed (includes completed medication regime)?  Yes   Comments regarding appointments  Pt had pain management appt last week   Does the patient have a primary care provider?   Yes   Does the patient have an appointment with their PCP within 7 days of discharge?  Greater than 7 days   Comments regarding PCP  PCP appt in PCP office 3/12/20 at 4:30 pm    Nursing Interventions  Verified appointment date/time/provider   Has the patient kept scheduled appointments due by today?  N/A   DME comments  Pt has a wheelchair and cane. She uses cane more than wheelchair.   Psychosocial issues?  Yes   Psychosocial comments  Pt lives by herself. She's had an amputation of one leg.   Comments  Pt was discharged about a month ago from [facility] for fluid build-up and redness on amputated side.    What is the patient's perception of their health status since discharge?  Improving   Is the patient/caregiver able to teach back signs and symptoms related to disease process for when to call PCP?  Yes   Is the patient/caregiver able to teach back signs and symptoms related to disease process for when to call 911?  Yes   Is the patient/caregiver able to teach back the hierarchy of who to call/visit for symptoms/problems? PCP, Specialist, Home health nurse, Urgent Care, ED, 911  Yes   If the patient is a  current smoker, are they able to teach back resources for cessation?  Smoking cessation medications, [id number] [Smoker]   TCM call completed?  Yes   Wrap up additional comments  Pt has no minutes on her phone at time of call. Sister Mary contacts pt via fb video.           Tamia Ch RN    3/12/2020, 11:06

## 2020-03-12 NOTE — PROGRESS NOTES
Jocelyn Palomo is a 54 y.o. female who presents to the clinic c/o upper respiratory symptoms. In addition, she has DM, type 2 complicated with a left diabetic foot infection requiring a  transmetatarsal amputation in 12//18.  She was hospitalized at [facility] from 01/21-01/28/2020 for a left leg infection, severe sepsis, acute renal failure and HTN. Since she has been home, she reports she has done well. This hospitalization has been requested and reviewed. Jocelyn does report she is needing medication refills and a prescription for a cane.     URI    This is a new problem. The current episode started in the past 7 days. The problem has been gradually worsening. Maximum temperature: Low Grade. Associated symptoms include congestion, coughing, headaches, nausea, rhinorrhea, sinus pain, a sore throat and wheezing. Pertinent negatives include no chest pain, diarrhea, dysuria, ear pain, swollen glands or vomiting. She has tried acetaminophen and NSAIDs for the symptoms.     Diabetes   She presents for her initial diabetic visit. She has type 2 diabetes mellitus. The initial diagnosis of diabetes was made 0 years ago. Hypoglycemia symptoms include headaches, nervousness/anxiousness and sweats. Associated symptoms include blurred vision, polydipsia and polyuria. Pertinent negatives for diabetes include no chest pain, no fatigue and no weight loss. Diabetic complications include peripheral neuropathy. Risk factors for coronary artery disease include diabetes mellitus, dyslipidemia, family history and tobacco exposure. Current diabetic treatment includes insulin injections. She is currently taking insulin pre-breakfast, pre-lunch, pre-dinner and at bedtime. Insulin injections are given by patient. Rotation sites for injection include the abdominal wall. She is following a generally unhealthy diet. She does not see a podiatrist. Eye exam she reports was completed in Jan 2020.  Her A1C at [facility] was over 13.  "    Hypertension   The current episode started more than 1 year ago. Associated symptoms include headaches. Pertinent negatives include no blurred vision, chest pain, orthopnea, palpitations, peripheral edema, PND, shortness of breath or sweats. Risk factors for coronary artery disease include diabetes mellitus, sedentary lifestyle, smoking/tobacco exposure and stress. Current antihypertension treatment includes ACE inhibitors and diuretics.      Vitals:    03/12/20 1643   BP: 140/88   Pulse: 106   Resp: 14   Temp: 98.7 °F (37.1 °C)   TempSrc: Temporal   SpO2: 98%   Weight: 74.7 kg (164 lb 9.6 oz)   Height: 165.1 cm (65\")        The following portions of the patient's history were reviewed and updated as appropriate: allergies, current medications, past family history, past medical history, past social history, past surgical history and problem list.    Review of Systems   Constitutional: Positive for fatigue. Negative for activity change, appetite change, chills, fever and unexpected weight change.   HENT: Positive for congestion, postnasal drip, rhinorrhea, sinus pressure, sinus pain and sore throat. Negative for ear discharge, ear pain and trouble swallowing.    Eyes: Negative for blurred vision and visual disturbance.   Respiratory: Positive for cough. Negative for shortness of breath and wheezing.    Cardiovascular: Negative for chest pain, palpitations, orthopnea, leg swelling and PND.   Gastrointestinal: Positive for nausea. Negative for vomiting.   Endocrine: Negative for cold intolerance, heat intolerance, polydipsia, polyphagia and polyuria.   Musculoskeletal: Positive for gait problem.   Skin: Negative for color change and rash.   Neurological: Positive for headaches. Negative for dizziness, tremors, speech difficulty, weakness and light-headedness.   Hematological: Negative for adenopathy.   Psychiatric/Behavioral: Negative for confusion, decreased concentration and suicidal ideas. The patient is " nervous/anxious.    All other systems reviewed and are negative.    Physical Exam   Constitutional: She is oriented to person, place, and time. She appears well-developed and well-nourished. No distress.   In good spirits; Well groomed. pleasant   HENT:   Head: Normocephalic.   Right Ear: Tympanic membrane is scarred. Tympanic membrane is not erythematous.   Left Ear: Tympanic membrane is scarred. Tympanic membrane is not erythematous.   Nose: Mucosal edema and rhinorrhea present. Right sinus exhibits maxillary sinus tenderness. Right sinus exhibits no frontal sinus tenderness. Left sinus exhibits maxillary sinus tenderness. Left sinus exhibits no frontal sinus tenderness.   Mouth/Throat: Oropharynx is clear and moist and mucous membranes are normal. No oropharyngeal exudate.   Eyes: Pupils are equal, round, and reactive to light. Conjunctivae and EOM are normal. Right eye exhibits no discharge. Left eye exhibits no discharge.   Neck: Neck supple.   Cardiovascular: Normal rate, regular rhythm and normal heart sounds. Exam reveals no friction rub.   No murmur heard.  Pulmonary/Chest: Effort normal. No respiratory distress. She has decreased breath sounds. She has no wheezes. She has no rhonchi. She has no rales.   Abdominal: Soft. Bowel sounds are normal. She exhibits no distension. There is no tenderness. There is no rebound and no guarding.   Musculoskeletal: She exhibits no edema or tenderness.     Vascular Status -  Her right foot exhibits normal foot vasculature  and no edema. Her left foot exhibits normal foot vasculature  and no edema.  Skin Integrity  -  Her right foot skin is intact.  Left foot skin intact: Noted well  healed transmetatarsal amputation..  Lymphadenopathy:     She has no cervical adenopathy.   Neurological: She is alert and oriented to person, place, and time.   Skin: Skin is warm and dry. No rash noted. No erythema.   Psychiatric: She has a normal mood and affect. Her speech is normal and  behavior is normal. Judgment and thought content normal.   Nursing note and vitals reviewed.    Assessment/Plan   Patient's Body mass index is 27.39 kg/m². BMI is above normal parameters. Recommendations include: educational material, exercise counseling and nutrition counseling.   (Normal BMI:  18.5-24.9, OW 25-29.9, Obesity 30 or greater)      Problems Addressed this Visit        Cardiovascular and Mediastinum    Mixed hyperlipidemia    Relevant Medications    rosuvastatin (CRESTOR) 40 MG tablet    Essential hypertension    Relevant Medications    lisinopril-hydrochlorothiazide (PRINZIDE,ZESTORETIC) 20-25 MG per tablet       Digestive    Gastroesophageal reflux disease without esophagitis    Relevant Medications    pantoprazole (PROTONIX) 40 MG EC tablet       Endocrine    Type 2 diabetes mellitus with peripheral neuropathy (CMS/[facility]) - Primary    Relevant Medications    Blood Glucose Monitoring Suppl (BLOOD GLUCOSE MONITOR SYSTEM) w/Device kit    glucose blood test strip    insulin detemir (LEVEMIR FLEXTOUCH) 100 UNIT/ML injection    Insulin Pen Needle 32G X 4 MM misc    insulin aspart (novoLOG FLEXPEN) 100 UNIT/ML solution pen-injector sc pen      Other Visit Diagnoses     Partial nontraumatic amputation of left foot (CMS/[facility])        Cane prescription given    Relevant Orders    Ambulatory Referral to Podiatry (Completed)    Cane    Acute maxillary sinusitis, recurrence not specified        Counseled regarding supportive care measures and smoking cessation    Relevant Medications    amoxicillin-clavulanate (AUGMENTIN) 875-125 MG per tablet    Nausea        Phenergan provided    Relevant Medications    promethazine (PHENERGAN) 12.5 MG tablet        Findings and recommendations discussed with Jocelyn. Treatment options reviewed. Will prescribe Augmentin for her Sinusitis. Counseled regarding supportive care measures. Appropriate refills provided. Will have her to f/u in one week; sooner if problems/concerns occur.          This document has been electronically signed by BARBI Lombardo, TRINY-BC, CDE  March 12, 2020 17:05

## 2020-03-12 NOTE — PATIENT INSTRUCTIONS
"DASH Eating Plan  DASH stands for \"Dietary Approaches to Stop Hypertension.\" The DASH eating plan is a healthy eating plan that has been shown to reduce high blood pressure (hypertension). It may also reduce your risk for type 2 diabetes, heart disease, and stroke. The DASH eating plan may also help with weight loss.  What are tips for following this plan?    General guidelines  · Avoid eating more than 2,300 mg (milligrams) of salt (sodium) a day. If you have hypertension, you may need to reduce your sodium intake to 1,500 mg a day.  · Limit alcohol intake to no more than 1 drink a day for nonpregnant women and 2 drinks a day for men. One drink equals 12 oz of beer, 5 oz of wine, or 1½ oz of hard liquor.  · Work with your health care provider to maintain a healthy body weight or to lose weight. Ask what an ideal weight is for you.  · Get at least 30 minutes of exercise that causes your heart to beat faster (aerobic exercise) most days of the week. Activities may include walking, swimming, or biking.  · Work with your health care provider or diet and nutrition specialist (dietitian) to adjust your eating plan to your individual calorie needs.  Reading food labels    · Check food labels for the amount of sodium per serving. Choose foods with less than 5 percent of the Daily Value of sodium. Generally, foods with less than 300 mg of sodium per serving fit into this eating plan.  · To find whole grains, look for the word \"whole\" as the first word in the ingredient list.  Shopping  · Buy products labeled as \"low-sodium\" or \"no salt added.\"  · Buy fresh foods. Avoid canned foods and premade or frozen meals.  Cooking  · Avoid adding salt when cooking. Use salt-free seasonings or herbs instead of table salt or sea salt. Check with your health care provider or pharmacist before using salt substitutes.  · Do not quiñonez foods. Cook foods using healthy methods such as baking, boiling, grilling, and broiling instead.  · Cook with " heart-healthy oils, such as olive, canola, soybean, or sunflower oil.  Meal planning  · Eat a balanced diet that includes:  ? 5 or more servings of fruits and vegetables each day. At each meal, try to fill half of your plate with fruits and vegetables.  ? Up to 6-8 servings of whole grains each day.  ? Less than 6 oz of lean meat, poultry, or fish each day. A 3-oz serving of meat is about the same size as a deck of cards. One egg equals 1 oz.  ? 2 servings of low-fat dairy each day.  ? A serving of nuts, seeds, or beans 5 times each week.  ? Heart-healthy fats. Healthy fats called Omega-3 fatty acids are found in foods such as flaxseeds and coldwater fish, like sardines, salmon, and mackerel.  · Limit how much you eat of the following:  ? Canned or prepackaged foods.  ? Food that is high in trans fat, such as fried foods.  ? Food that is high in saturated fat, such as fatty meat.  ? Sweets, desserts, sugary drinks, and other foods with added sugar.  ? Full-fat dairy products.  · Do not salt foods before eating.  · Try to eat at least 2 vegetarian meals each week.  · Eat more home-cooked food and less restaurant, buffet, and fast food.  · When eating at a restaurant, ask that your food be prepared with less salt or no salt, if possible.  What foods are recommended?  The items listed may not be a complete list. Talk with your dietitian about what dietary choices are best for you.  Grains  Whole-grain or whole-wheat bread. Whole-grain or whole-wheat pasta. Brown rice. Oatmeal. Quinoa. Bulgur. Whole-grain and low-sodium cereals. Joslyn bread. Low-fat, low-sodium crackers. Whole-wheat flour tortillas.  Vegetables  Fresh or frozen vegetables (raw, steamed, roasted, or grilled). Low-sodium or reduced-sodium tomato and vegetable juice. Low-sodium or reduced-sodium tomato sauce and tomato paste. Low-sodium or reduced-sodium canned vegetables.  Fruits  All fresh, dried, or frozen fruit. Canned fruit in natural juice (without  added sugar).  Meat and other protein foods  Skinless chicken or turkey. Ground chicken or turkey. Pork with fat trimmed off. Fish and seafood. Egg whites. Dried beans, peas, or lentils. Unsalted nuts, nut butters, and seeds. Unsalted canned beans. Lean cuts of beef with fat trimmed off. Low-sodium, lean deli meat.  Dairy  Low-fat (1%) or fat-free (skim) milk. Fat-free, low-fat, or reduced-fat cheeses. Nonfat, low-sodium ricotta or cottage cheese. Low-fat or nonfat yogurt. Low-fat, low-sodium cheese.  Fats and oils  Soft margarine without trans fats. Vegetable oil. Low-fat, reduced-fat, or light mayonnaise and salad dressings (reduced-sodium). Canola, safflower, olive, soybean, and sunflower oils. Avocado.  Seasoning and other foods  Herbs. Spices. Seasoning mixes without salt. Unsalted popcorn and pretzels. Fat-free sweets.  What foods are not recommended?  The items listed may not be a complete list. Talk with your dietitian about what dietary choices are best for you.  Grains  Baked goods made with fat, such as croissants, muffins, or some breads. Dry pasta or rice meal packs.  Vegetables  Creamed or fried vegetables. Vegetables in a cheese sauce. Regular canned vegetables (not low-sodium or reduced-sodium). Regular canned tomato sauce and paste (not low-sodium or reduced-sodium). Regular tomato and vegetable juice (not low-sodium or reduced-sodium). Pickles. Olives.  Fruits  Canned fruit in a light or heavy syrup. Fried fruit. Fruit in cream or butter sauce.  Meat and other protein foods  Fatty cuts of meat. Ribs. Fried meat. Davis. Sausage. Bologna and other processed lunch meats. Salami. Fatback. Hotdogs. Bratwurst. Salted nuts and seeds. Canned beans with added salt. Canned or smoked fish. Whole eggs or egg yolks. Chicken or turkey with skin.  Dairy  Whole or 2% milk, cream, and half-and-half. Whole or full-fat cream cheese. Whole-fat or sweetened yogurt. Full-fat cheese. Nondairy creamers. Whipped toppings.  Processed cheese and cheese spreads.  Fats and oils  Butter. Stick margarine. Lard. Shortening. Ghee. Davis fat. Tropical oils, such as coconut, palm kernel, or palm oil.  Seasoning and other foods  Salted popcorn and pretzels. Onion salt, garlic salt, seasoned salt, table salt, and sea salt. Worcestershire sauce. Tartar sauce. Barbecue sauce. Teriyaki sauce. Soy sauce, including reduced-sodium. Steak sauce. Canned and packaged gravies. Fish sauce. Oyster sauce. Cocktail sauce. Horseradish that you find on the shelf. Ketchup. Mustard. Meat flavorings and tenderizers. Bouillon cubes. Hot sauce and Tabasco sauce. Premade or packaged marinades. Premade or packaged taco seasonings. Relishes. Regular salad dressings.  Where to find more information:  · National Heart, Lung, and Blood [place]: www.nhlbi.nih.gov  · American Heart Association: www.heart.org  Summary  · The DASH eating plan is a healthy eating plan that has been shown to reduce high blood pressure (hypertension). It may also reduce your risk for type 2 diabetes, heart disease, and stroke.  · With the DASH eating plan, you should limit salt (sodium) intake to 2,300 mg a day. If you have hypertension, you may need to reduce your sodium intake to 1,500 mg a day.  · When on the DASH eating plan, aim to eat more fresh fruits and vegetables, whole grains, lean proteins, low-fat dairy, and heart-healthy fats.  · Work with your health care provider or diet and nutrition specialist (dietitian) to adjust your eating plan to your individual calorie needs.  This information is not intended to replace advice given to you by your health care provider. Make sure you discuss any questions you have with your health care provider.  Document Released: 12/06/2012 Document Revised: 12/11/2017 Document Reviewed: 12/11/2017  [employer] Interactive Patient Education © 2020 [employer]  Type 2 Diabetes Mellitus, Self Care, Adult  When you have type 2 diabetes (type 2 diabetes  mellitus), you must make sure your blood sugar (glucose) stays in a healthy range. You can do this with:  · Nutrition.  · Exercise.  · Lifestyle changes.  · Medicines or insulin, if needed.  · Support from your doctors and others.  How to stay aware of blood sugar    · Check your blood sugar level every day, as often as told.  · Have your A1c (hemoglobin A1c) level checked two or more times a year. Have it checked more often if your doctor tells you to.  Your doctor will set personal treatment goals for you. Generally, you should have these blood sugar levels:  · Before meals (preprandial):  mg/dL (4.4-7.2 mmol/L).  · After meals (postprandial): below 180 mg/dL (10 mmol/L).  · A1c level: less than 7%.  How to manage high and low blood sugar  Signs of high blood sugar  High blood sugar is called hyperglycemia. Know the signs of high blood sugar. Signs may include:  · Feeling:  ? Thirsty.  ? Hungry.  ? Very tired.  · Needing to pee (urinate) more than usual.  · Blurry vision.  Signs of low blood sugar  Low blood sugar is called hypoglycemia. This is when blood sugar is at or below 70 mg/dL (3.9 mmol/L). Signs may include:  · Feeling:  ? Hungry.  ? Worried or nervous (anxious).  ? Sweaty and clammy.  ? Confused.  ? Dizzy.  ? Sleepy.  ? Sick to your stomach (nauseous).  · Having:  ? A fast heartbeat.  ? A headache.  ? A change in your vision.  ? Jerky movements that you cannot control (seizure).  ? Tingling or no feeling (numbness) around your mouth, lips, or tongue.  · Having trouble with:  ? Moving (coordination).  ? Sleeping.  ? Passing out (fainting).  ? Getting upset easily (irritability).  Treating low blood sugar  To treat low blood sugar, eat or drink something sugary right away. If you can think clearly and swallow safely, follow the 15:15 rule:  · Take 15 grams of a fast-acting carb (carbohydrate). Talk with your doctor about how much you should take.  · Some fast-acting carbs are:  ? Sugar tablets  (glucose pills). Take 3-4 pills.  ? 6-8 pieces of hard candy.  ? 4-6 oz (120-150 mL) of fruit juice.  ? 4-6 oz (120-150 mL) of regular (not diet) soda.  ? 1 Tbsp (15 mL) honey or sugar.  · Check your blood sugar 15 minutes after you take the carb.  · If your blood sugar is still at or below 70 mg/dL (3.9 mmol/L), take 15 grams of a carb again.  · If your blood sugar does not go above 70 mg/dL (3.9 mmol/L) after 3 tries, get help right away.  · After your blood sugar goes back to normal, eat a meal or a snack within 1 hour.  Treating very low blood sugar  If your blood sugar is at or below 54 mg/dL (3 mmol/L), you have very low blood sugar (severe hypoglycemia). This is an emergency. Do not wait to see if the symptoms will go away. Get medical help right away. Call your local emergency services (911 in the [place]  If you have very low blood sugar and you cannot eat or drink, you may need a glucagon shot (injection). A family member or friend should learn how to check your blood sugar and how to give you a glucagon shot. Ask your doctor if you need to have a glucagon shot kit at home.  Follow these instructions at home:  Medicine  · Take insulin and diabetes medicines as told.  · If your doctor says you should take more or less insulin and medicines, do this exactly as told.  · Do not run out of insulin or medicines.  Having diabetes can raise your risk for other long-term conditions. These include heart disease and kidney disease. Your doctor may prescribe medicines to help you not have these problems.  Food    · Make healthy food choices. These include:  ? Chicken, fish, egg whites, and beans.  ? Oats, whole wheat, bulgur, brown rice, quinoa, and millet.  ? Fresh fruits and vegetables.  ? Low-fat dairy products.  ? Nuts, avocado, olive oil, and canola oil.  · Meet with a  (dietitian). He or she can help you make an eating plan that is right for you.  · Follow instructions from your doctor about what  you cannot eat or drink.  · Drink enough fluid to keep your pee (urine) pale yellow.  · Keep track of carbs that you eat. Do this by reading food labels and learning food serving sizes.  · Follow your sick day plan when you cannot eat or drink normally. Make this plan with your doctor so it is ready to use.  Activity  · Exercise 3 or more times a week.  · Do not go more than 2 days without exercising.  · Talk with your doctor before you start a new exercise. Your doctor may need to tell you to change:  ? How much insulin or medicines you take.  ? How much food you eat.  Lifestyle  · Do not use any tobacco products. These include cigarettes, chewing tobacco, and e-cigarettes. If you need help quitting, ask your doctor.  · Ask your doctor how much alcohol is safe for you.  · Learn to deal with stress. If you need help with this, ask your doctor.  Body care    · Stay up to date with your shots (immunizations).  · Have your eyes and feet checked by a doctor as often as told.  · Check your skin and feet every day. Check for cuts, bruises, redness, blisters, or sores.  · Brush your teeth and gums two times a day. Floss one or more times a day.  · Go to the dentist one or more times every 6 months.  · Stay at a healthy weight.  General instructions  · Take over-the-counter and prescription medicines only as told by your doctor.  · Share your diabetes care plan with:  ? Your work or school.  ? People you live with.  · Carry a card or wear jewelry that says you have diabetes.  · Keep all follow-up visits as told by your doctor. This is important.  Questions to ask your doctor  · Do I need to meet with a diabetes educator?  · Where can I find a support group for people with diabetes?  Where to find more information  To learn more about diabetes, visit:  · American Diabetes Association: www.diabetes.org  · American Association of Diabetes Educators: www.diabeteseducator.org  Summary  · When you have type 2 diabetes, you must  make sure your blood sugar (glucose) stays in a healthy range.  · Check your blood sugar every day, as often as told.  · Having diabetes can raise your risk for other conditions. Your doctor may prescribe medicines to help you not have these problems.  · Keep all follow-up visits as told by your doctor. This is important.  This information is not intended to replace advice given to you by your health care provider. Make sure you discuss any questions you have with your health care provider.  Document Released: 04/10/2017 Document Revised: 06/10/2019 Document Reviewed: 01/20/2017  [employer] Interactive Patient Education © 2020 [employer]

## 2020-03-16 PROBLEM — L08.9 DIABETIC FOOT INFECTION: Status: RESOLVED | Noted: 2018-12-04 | Resolved: 2020-03-16

## 2020-03-16 PROBLEM — E11.628 DIABETIC FOOT INFECTION: Status: RESOLVED | Noted: 2018-12-04 | Resolved: 2020-03-16

## 2020-03-24 RX ORDER — POTASSIUM CHLORIDE 750 MG/1
10 TABLET, EXTENDED RELEASE ORAL DAILY
Qty: 30 TABLET | Refills: 5 | Status: SHIPPED | OUTPATIENT
Start: 2020-03-24 | End: 2020-07-02 | Stop reason: SDUPTHER

## 2020-04-02 ENCOUNTER — TELEPHONE (OUTPATIENT)
Dept: FAMILY MEDICINE CLINIC | Facility: CLINIC | Age: 55
End: 2020-04-02

## 2020-04-02 RX ORDER — PROMETHAZINE HYDROCHLORIDE 25 MG/1
25 TABLET ORAL EVERY 6 HOURS PRN
Qty: 30 TABLET | Refills: 5 | Status: SHIPPED | OUTPATIENT
Start: 2020-04-02 | End: 2020-07-02 | Stop reason: SDUPTHER

## 2020-04-02 NOTE — TELEPHONE ENCOUNTER
----- Message from Miki Christianson MD sent at 4/2/2020  9:31 AM EDT -----  Emailed    ----- Message -----  From: Ana Menendez RegSched Rep  Sent: 4/2/2020   8:49 AM EDT  To: Miki Christianson MD    Ana Lilia escalera prescribed phenergan 12.5, but patient says they dont help would like to change those to 25 mg if possible

## 2020-06-16 DIAGNOSIS — E11.42 TYPE 2 DIABETES MELLITUS WITH PERIPHERAL NEUROPATHY (HCC): Chronic | ICD-10-CM

## 2020-06-25 ENCOUNTER — TELEPHONE (OUTPATIENT)
Dept: FAMILY MEDICINE CLINIC | Facility: CLINIC | Age: 55
End: 2020-06-25

## 2020-06-25 RX ORDER — FLUCONAZOLE 150 MG/1
150 TABLET ORAL ONCE
Qty: 1 TABLET | Refills: 0 | Status: SHIPPED | OUTPATIENT
Start: 2020-06-25 | End: 2020-06-25

## 2020-06-25 NOTE — TELEPHONE ENCOUNTER
----- Message from Miki Christianson MD sent at 6/25/2020 12:09 PM EDT -----  Emailed    ----- Message -----  From: Mary Liao MA  Sent: 6/25/2020   8:23 AM EDT  To: Miik Christianson MD    Patient called and wanted to know if you could send her in some diflucan?

## 2020-07-02 DIAGNOSIS — K21.9 GASTROESOPHAGEAL REFLUX DISEASE WITHOUT ESOPHAGITIS: Chronic | ICD-10-CM

## 2020-07-02 DIAGNOSIS — E78.2 MIXED HYPERLIPIDEMIA: Chronic | ICD-10-CM

## 2020-07-02 DIAGNOSIS — E11.42 TYPE 2 DIABETES MELLITUS WITH PERIPHERAL NEUROPATHY (HCC): ICD-10-CM

## 2020-07-02 DIAGNOSIS — E11.42 TYPE 2 DIABETES MELLITUS WITH PERIPHERAL NEUROPATHY (HCC): Chronic | ICD-10-CM

## 2020-07-02 RX ORDER — PANTOPRAZOLE SODIUM 40 MG/1
40 TABLET, DELAYED RELEASE ORAL DAILY
Qty: 30 TABLET | Refills: 0 | Status: SHIPPED | OUTPATIENT
Start: 2020-07-02 | End: 2020-08-07 | Stop reason: SDUPTHER

## 2020-07-02 RX ORDER — PROMETHAZINE HYDROCHLORIDE 25 MG/1
25 TABLET ORAL EVERY 6 HOURS PRN
Qty: 30 TABLET | Refills: 0 | Status: SHIPPED | OUTPATIENT
Start: 2020-07-02 | End: 2020-08-11 | Stop reason: SDUPTHER

## 2020-07-02 RX ORDER — OMEGA-3 FATTY ACIDS/FISH OIL 300-1000MG
2000 CAPSULE ORAL 2 TIMES DAILY
Qty: 120 EACH | Refills: 0 | Status: SHIPPED | OUTPATIENT
Start: 2020-07-02 | End: 2021-03-04

## 2020-07-02 RX ORDER — ROSUVASTATIN CALCIUM 40 MG/1
40 TABLET, COATED ORAL NIGHTLY
Qty: 30 TABLET | Refills: 0 | Status: SHIPPED | OUTPATIENT
Start: 2020-07-02 | End: 2021-03-04 | Stop reason: SDUPTHER

## 2020-07-02 RX ORDER — POTASSIUM CHLORIDE 750 MG/1
10 TABLET, EXTENDED RELEASE ORAL DAILY
Qty: 30 TABLET | Refills: 0 | Status: SHIPPED | OUTPATIENT
Start: 2020-07-02 | End: 2021-03-04 | Stop reason: SDUPTHER

## 2020-07-23 ENCOUNTER — TELEPHONE (OUTPATIENT)
Dept: FAMILY MEDICINE CLINIC | Facility: CLINIC | Age: 55
End: 2020-07-23

## 2020-07-23 RX ORDER — FLUCONAZOLE 150 MG/1
150 TABLET ORAL ONCE
Qty: 1 TABLET | Refills: 0 | Status: SHIPPED | OUTPATIENT
Start: 2020-07-23 | End: 2020-07-23

## 2020-07-23 NOTE — TELEPHONE ENCOUNTER
----- Message from Miki Christianson MD sent at 7/23/2020  3:41 PM EDT -----  Emailed  She needs an appt with jw sometime    ----- Message -----  From: Mary Liao MA  Sent: 7/23/2020   2:01 PM EDT  To: Miki Christianson MD    Patient called requesting a prescription for diflucan, she said her sugar medicine has gave her a bad yeast infection again.

## 2020-08-04 ENCOUNTER — OFFICE VISIT (OUTPATIENT)
Dept: FAMILY MEDICINE CLINIC | Facility: CLINIC | Age: 55
End: 2020-08-04

## 2020-08-04 DIAGNOSIS — J32.0 MAXILLARY SINUSITIS, UNSPECIFIED CHRONICITY: Primary | ICD-10-CM

## 2020-08-04 DIAGNOSIS — E11.42 TYPE 2 DIABETES MELLITUS WITH PERIPHERAL NEUROPATHY (HCC): Chronic | ICD-10-CM

## 2020-08-04 PROCEDURE — 99442 PR PHYS/QHP TELEPHONE EVALUATION 11-20 MIN: CPT | Performed by: NURSE PRACTITIONER

## 2020-08-04 RX ORDER — LISINOPRIL AND HYDROCHLOROTHIAZIDE 25; 20 MG/1; MG/1
1 TABLET ORAL DAILY
COMMUNITY
End: 2021-03-04 | Stop reason: SDUPTHER

## 2020-08-04 RX ORDER — FLUCONAZOLE 150 MG/1
150 TABLET ORAL DAILY
Qty: 3 TABLET | Refills: 0 | Status: SHIPPED | OUTPATIENT
Start: 2020-08-04 | End: 2020-08-07

## 2020-08-04 RX ORDER — AMOXICILLIN 875 MG/1
875 TABLET, COATED ORAL EVERY 12 HOURS SCHEDULED
Qty: 20 TABLET | Refills: 0 | Status: SHIPPED | OUTPATIENT
Start: 2020-08-04 | End: 2020-09-10

## 2020-08-04 RX ORDER — LANCETS 28 GAUGE
1 EACH MISCELLANEOUS
Qty: 100 EACH | Refills: 2 | Status: SHIPPED | OUTPATIENT
Start: 2020-08-04

## 2020-08-04 NOTE — PROGRESS NOTES
You have chosen to receive care through a telephone visit. Do you consent to use a telephone visit for your medical care today? Yes    Jocelyn Palomo is a 55 y.o. female who is c/o upper respiratory symptoms which started three to four days ago and worsening. In addition, she has DM, type 2 which is uncontrolled.     URI    This is a new problem. The current episode started in the past 7 days. The problem has been gradually worsening. There has been no fever. Associated symptoms include congestion, headaches, rhinorrhea, sinus pain, a sore throat and swollen glands. Pertinent negatives include no abdominal pain, chest pain, coughing, diarrhea, ear pain, nausea, plugged ear sensation, rash, sneezing, vomiting or wheezing. She has tried acetaminophen (Gargles for sore throat) for the symptoms. The treatment provided mild relief.      Diabetes   She has type 2 diabetes mellitus.  Associated symptoms include blurred vision, polydipsia and polyuria. Pertinent negatives for diabetes include no chest pain, no fatigue and no weight loss. Diabetic complications include peripheral neuropathy and partial amputation of her left foot. Risk factors for coronary artery disease include diabetes mellitus, dyslipidemia, family history and tobacco exposure. Current diabetic treatment includes insulin injections. She is currently taking insulin pre-breakfast, pre-lunch, pre-dinner and at bedtime. Insulin injections are given by patient. Rotation sites for injection include the abdominal wall.  Eye exam she reports was completed in Jan 2020.  No recent A1C    There were no vitals filed for this visit.     The following portions of the patient's history were reviewed and updated as appropriate: allergies, current medications, past family history, past medical history, past social history, past surgical history and problem list.    Review of Systems   Constitutional: Negative for activity change, appetite change, chills, fatigue, fever and  unexpected weight change.   HENT: Positive for congestion, rhinorrhea, sinus pain and sore throat. Negative for ear pain and sneezing.    Eyes: Negative for visual disturbance.   Respiratory: Negative for cough, chest tightness, shortness of breath and wheezing.    Cardiovascular: Negative for chest pain, palpitations and leg swelling.   Gastrointestinal: Negative for abdominal pain, constipation, diarrhea, nausea and vomiting.   Endocrine: Negative for cold intolerance, heat intolerance, polydipsia, polyphagia and polyuria.   Skin: Negative for color change and rash.   Neurological: Positive for headaches. Negative for dizziness, tremors, speech difficulty, weakness and light-headedness.   Hematological: Negative for adenopathy.   Psychiatric/Behavioral: Negative for confusion, decreased concentration and suicidal ideas. The patient is not nervous/anxious.    All other systems reviewed and are negative.    Physical Exam  Deferred  Assessment/Plan     Problems Addressed this Visit        Endocrine    Type 2 diabetes mellitus with peripheral neuropathy (CMS/[facility])    Relevant Medications    insulin detemir (LEVEMIR) 100 UNIT/ML injection    glucose blood test strip    Lancets Ultra Fine misc    fluconazole (DIFLUCAN) 150 MG tablet      Other Visit Diagnoses     Maxillary sinusitis, unspecified chronicity    -  Primary    Relevant Medications    amoxicillin (AMOXIL) 875 MG tablet        Symptoms discussed with Jocelyn and treatment options.Will start her on Amoxicillin 875 mg at this time.  Counseled regarding supportive care measures including smoking cessation and the need to improve her glycemic control. She is in need of some of her diabetes supplies which were ordered. Encouraged her to schedule a f/u appointment asa. Transportation is an issue and suggested she contact [facility] to see if that was an option for her. Encouraged to seek further medical evaluation if symptoms worsen or do not improve within 48-72  hours.  This visit has been scheduled as a phone visit to comply with patient safety concerns in accordance with CDC recommendations. Total time of discussion was 18 minutes.        This document has been electronically signed by BARBI Lombardo, JOSUE, ROJLEIO  August 4, 2020 16:00

## 2020-08-07 DIAGNOSIS — K21.9 GASTROESOPHAGEAL REFLUX DISEASE WITHOUT ESOPHAGITIS: Chronic | ICD-10-CM

## 2020-08-07 RX ORDER — PANTOPRAZOLE SODIUM 40 MG/1
40 TABLET, DELAYED RELEASE ORAL DAILY
Qty: 30 TABLET | Refills: 0 | Status: SHIPPED | OUTPATIENT
Start: 2020-08-07 | End: 2020-09-09 | Stop reason: SDUPTHER

## 2020-08-11 RX ORDER — PROMETHAZINE HYDROCHLORIDE 25 MG/1
25 TABLET ORAL EVERY 6 HOURS PRN
Qty: 30 TABLET | Refills: 0 | Status: SHIPPED | OUTPATIENT
Start: 2020-08-11 | End: 2020-09-08

## 2020-09-08 RX ORDER — PROMETHAZINE HYDROCHLORIDE 25 MG/1
TABLET ORAL
Qty: 30 TABLET | Refills: 0 | Status: SHIPPED | OUTPATIENT
Start: 2020-09-08 | End: 2020-09-24 | Stop reason: SDUPTHER

## 2020-09-09 DIAGNOSIS — K21.9 GASTROESOPHAGEAL REFLUX DISEASE WITHOUT ESOPHAGITIS: Chronic | ICD-10-CM

## 2020-09-09 RX ORDER — PANTOPRAZOLE SODIUM 40 MG/1
40 TABLET, DELAYED RELEASE ORAL DAILY
Qty: 30 TABLET | Refills: 5 | Status: SHIPPED | OUTPATIENT
Start: 2020-09-09 | End: 2020-11-06 | Stop reason: SDUPTHER

## 2020-09-10 ENCOUNTER — OFFICE VISIT (OUTPATIENT)
Dept: FAMILY MEDICINE CLINIC | Facility: CLINIC | Age: 55
End: 2020-09-10

## 2020-09-10 DIAGNOSIS — N28.9 RENAL INSUFFICIENCY: ICD-10-CM

## 2020-09-10 DIAGNOSIS — L08.9 DIABETIC FOOT INFECTION (HCC): ICD-10-CM

## 2020-09-10 DIAGNOSIS — Z89.432 HISTORY OF TRANSMETATARSAL AMPUTATION OF LEFT FOOT (HCC): ICD-10-CM

## 2020-09-10 DIAGNOSIS — I10 ESSENTIAL HYPERTENSION: ICD-10-CM

## 2020-09-10 DIAGNOSIS — E78.2 MIXED HYPERLIPIDEMIA: Primary | ICD-10-CM

## 2020-09-10 DIAGNOSIS — F33.41 RECURRENT MAJOR DEPRESSIVE DISORDER, IN PARTIAL REMISSION (HCC): ICD-10-CM

## 2020-09-10 DIAGNOSIS — F17.200 SMOKER: ICD-10-CM

## 2020-09-10 DIAGNOSIS — E11.42 TYPE 2 DIABETES MELLITUS WITH PERIPHERAL NEUROPATHY (HCC): ICD-10-CM

## 2020-09-10 DIAGNOSIS — E11.628 DIABETIC FOOT INFECTION (HCC): ICD-10-CM

## 2020-09-10 DIAGNOSIS — K21.9 GASTROESOPHAGEAL REFLUX DISEASE WITHOUT ESOPHAGITIS: ICD-10-CM

## 2020-09-10 DIAGNOSIS — Z00.00 HEALTHCARE MAINTENANCE: ICD-10-CM

## 2020-09-10 PROCEDURE — 99442 PR PHYS/QHP TELEPHONE EVALUATION 11-20 MIN: CPT | Performed by: GENERAL PRACTICE

## 2020-09-10 RX ORDER — AMOXICILLIN AND CLAVULANATE POTASSIUM 875; 125 MG/1; MG/1
1 TABLET, FILM COATED ORAL 2 TIMES DAILY
Qty: 20 TABLET | Refills: 0 | Status: SHIPPED | OUTPATIENT
Start: 2020-09-10 | End: 2021-01-06

## 2020-09-10 RX ORDER — SULFAMETHOXAZOLE AND TRIMETHOPRIM 800; 160 MG/1; MG/1
2 TABLET ORAL 2 TIMES DAILY
Qty: 40 TABLET | Refills: 0 | Status: SHIPPED | OUTPATIENT
Start: 2020-09-10 | End: 2020-09-20

## 2020-09-10 NOTE — PROGRESS NOTES
Subjective   Jocelyn Palomo is a 55 y.o. female.     History of Present Illness     This visit has been rescheduled as a phone visit to comply with patient safety concerns in accordance with CDC recommendations. Total time of discussion was 12 minutes.    You have chosen to receive care through a telephone visit. Do you consent to use a telephone visit for your medical care today? Yes    Left Diabetic Foot Infection  She is nearly 2 years post left transmetatarsal amputation. She required admission over 7 months ago for a another infection.  Within the last month she is developed a new sore at the stump that has been draining on and off.  There is no history of any pain, redness, or swelling and she denies any flulike symptoms, fever, or chills.  She continues to be followed by podiatry but apparently has not had a recent appointment and records are unavailable.    Diabetes  Current symptoms include paresthesia of the feet with a sore on the left along with intermittent visual blurring. Patient denies visual loss, polydipsia, polyuria, or hypoglycemia. Evaluation to date has been: hemoglobin A1C. Current treatments: basal insulin - levemir 36 qd and mealtime insulin - novolog 6 with meals.  She is noncompliant with her diet, exercise, and laboratory monitoring.  She has not had a diabetic eye exam for over 5 years    Dyslipidemia  Compliance with treatment has been fair. The patient exercises occasionally. She is currently taking the following medication for her dyslipidemia -rosuvastatin and omega-3 fatty acids.  She denies any apparent side effects    Hypertension  Home blood pressure readings: not doing. Associated signs and symptoms: none. Patient denies: chest pain, palpitations, dyspnea, orthopnea, paroxysmal nocturnal dyspnea or peripheral edema. Current antihypertensive medications includes lisinopril and hydrochlorothiazide. Medication compliance: taking as prescribed.     Chronic Pain Syndrome  She has  chronic low back and joint pain as well as burning and tingling about both feet. She denies any changes in her strength, gait, or bowel/bladder control.  She continues to be followed by pain management     The following portions of the patient's history were reviewed and updated as appropriate: allergies, current medications, past medical history, past social history, past surgical history and problem list.    Review of Systems   Constitutional: Positive for fatigue. Negative for appetite change, chills, fever and unexpected weight change.   HENT: Negative for congestion, ear pain, rhinorrhea, sneezing and sore throat.    Eyes: Negative for visual disturbance.   Respiratory: Negative for cough, shortness of breath and wheezing.    Cardiovascular: Negative for chest pain, palpitations and leg swelling.   Gastrointestinal: Negative for abdominal pain, blood in stool, constipation, diarrhea, nausea and vomiting.   Endocrine: Negative for polydipsia and polyuria.   Genitourinary: Negative for dysuria, frequency, hematuria and urgency.   Musculoskeletal: Positive for arthralgias, back pain and neck pain. Negative for joint swelling and myalgias.   Skin: Positive for wound (left foot). Negative for color change.   Neurological: Positive for numbness. Negative for weakness and headaches.   Psychiatric/Behavioral: Positive for decreased concentration and sleep disturbance. Negative for dysphoric mood and suicidal ideas. The patient is not nervous/anxious.      Objective   Physical Exam   Constitutional:   Alert and oriented.  Bright and in good spirits.  No apparent distress or shortness of breath.     Assessment/Plan   Problems Addressed this Visit        Cardiovascular and Mediastinum    Essential hypertension  Encouraged to continue to work on her diet and exercise plan.  Continue current medication    Mixed hyperlipidemia   As above.   Continue current medication.       Digestive    Gastroesophageal reflux disease  without esophagitis       Endocrine    Diabetic foot infection (CMS/HCC)  Recurrent left diabetic foot infection post transmetatarsal amputation.  Pathogen at the time was suspected to be MRSA  Patient is unable to come in until 6 days from now.  Will start on empiric antibiotics in the meantime.  Strongly encouraged to proceed to the ER if any worse or if any new symptoms prior to her return    Relevant Medications    sulfamethoxazole-trimethoprim (BACTRIM DS,SEPTRA DS) 800-160 MG per tablet    amoxicillin-clavulanate (AUGMENTIN) 875-125 MG per tablet    Type 2 diabetes mellitus with peripheral neuropathy (CMS/HCC)  Diabetes mellitus Type II, under poor control.   Encouraged to continue to pursue ADA diet  Encouraged aerobic exercise.   Continue current medication  Update labs will be drawn at her return       Genitourinary    Renal insufficiency       Other    Healthcare maintenance  Recommended a flu shot at her return    History of transmetatarsal amputation of left foot (CMS/HCC)    Recurrent major depressive disorder, in partial remission (CMS/HCC)    Smoker

## 2020-09-16 ENCOUNTER — OFFICE VISIT (OUTPATIENT)
Dept: FAMILY MEDICINE CLINIC | Facility: CLINIC | Age: 55
End: 2020-09-16

## 2020-09-16 VITALS
WEIGHT: 177 LBS | HEART RATE: 107 BPM | DIASTOLIC BLOOD PRESSURE: 82 MMHG | BODY MASS INDEX: 29.49 KG/M2 | TEMPERATURE: 98.4 F | OXYGEN SATURATION: 97 % | RESPIRATION RATE: 14 BRPM | SYSTOLIC BLOOD PRESSURE: 130 MMHG | HEIGHT: 65 IN

## 2020-09-16 DIAGNOSIS — K21.9 GASTROESOPHAGEAL REFLUX DISEASE WITHOUT ESOPHAGITIS: Chronic | ICD-10-CM

## 2020-09-16 DIAGNOSIS — E78.2 MIXED HYPERLIPIDEMIA: Chronic | ICD-10-CM

## 2020-09-16 DIAGNOSIS — E55.9 VITAMIN D DEFICIENCY: ICD-10-CM

## 2020-09-16 DIAGNOSIS — E11.42 TYPE 2 DIABETES MELLITUS WITH PERIPHERAL NEUROPATHY (HCC): Primary | Chronic | ICD-10-CM

## 2020-09-16 DIAGNOSIS — I10 ESSENTIAL HYPERTENSION: Chronic | ICD-10-CM

## 2020-09-16 PROCEDURE — 99214 OFFICE O/P EST MOD 30 MIN: CPT | Performed by: NURSE PRACTITIONER

## 2020-09-16 PROCEDURE — 36415 COLL VENOUS BLD VENIPUNCTURE: CPT | Performed by: NURSE PRACTITIONER

## 2020-09-16 NOTE — PROGRESS NOTES
History of Present Illness      Jocelyn Palomo is a 55 y.o. female who presents to the clinic pertaining to her Diabetes, HTN and Dyslipidemia.    Diabetes   She has type 2 diabetes mellitus. The initial diagnosis of diabetes was made years ago.  Associated symptoms include fatigue, peripheral neuropathy, and intermittent visual  Issues. Pertinent negatives for diabetes include no chest pain and no weight loss. Diabetic complications include peripheral neuropathy and partial amputation of left foot. Risk factors for coronary artery disease include diabetes mellitus, dyslipidemia, family history and tobacco exposure. Current diabetic treatment includes insulin injections. She is currently taking insulin pre-breakfast, pre-lunch, pre-dinner and at bedtime. Insulin injections are given by patient. Rotation sites for injection include the abdominal wall. She is following a generally unhealthy diet.  Her A1C at [facility] in Jan 2020 was over 13.     Hypertension   The current episode started more than 1 year ago. Associated symptoms include headaches. Pertinent negatives include no blurred vision, chest pain, orthopnea, palpitations, peripheral edema, PND, shortness of breath or sweats. Risk factors for coronary artery disease include diabetes mellitus, sedentary lifestyle, smoking/tobacco exposure and stress. Current antihypertension treatment includes ACE inhibitors and diuretics.     Hyperlipidemia  The current episode started more than 1 year ago. The problem is uncontrolled. Pertinent negatives include no chest pain, myalgias or shortness of breath. Current antihyperlipidemic treatment includes statins. Compliance problems include adherence to diet.  Risk factors for coronary artery disease include diabetes mellitus, dyslipidemia, hypertension and post-menopausal.     Vitals:    09/16/20 1542   BP: 130/82   BP Location: Right arm   Patient Position: Sitting   Cuff Size: Adult   Pulse: 107   Resp: 14   Temp: 98.4 °F  "(36.9 °C)   TempSrc: Temporal   SpO2: 97%   Weight: 80.3 kg (177 lb)   Height: 165.1 cm (65\")      The following portions of the patient's history were reviewed and updated as appropriate: allergies, current medications, past family history, past medical history, past social history, past surgical history and problem list.    Review of Systems   Constitutional: Positive for fatigue. Negative for activity change, appetite change, chills, fever and unexpected weight change.   HENT: Negative.    Eyes: Positive for visual disturbance (Intermittent).   Respiratory: Negative for cough, shortness of breath and wheezing.    Cardiovascular: Positive for leg swelling. Negative for chest pain and palpitations.   Gastrointestinal: Positive for nausea. Negative for constipation, diarrhea and vomiting.   Endocrine: Negative for cold intolerance, heat intolerance, polydipsia, polyphagia and polyuria.   Musculoskeletal: Positive for arthralgias and gait problem. Negative for myalgias.   Skin: Negative for color change and rash.   Neurological: Positive for numbness. Negative for dizziness, tremors, speech difficulty, weakness, light-headedness and headaches.   Hematological: Negative for adenopathy.   Psychiatric/Behavioral: Positive for sleep disturbance. Negative for confusion, decreased concentration and suicidal ideas. The patient is not nervous/anxious.    All other systems reviewed and are negative.    Physical Exam  Vitals signs reviewed.   Constitutional:       General: She is not in acute distress.     Appearance: She is well-developed.      Comments: Pleasant; in good spirits.She is accompanied by a friend who assists her with different tasks ie shopping, meals. Both wearing appropriate face covering   HENT:      Head: Normocephalic.      Nose: Nose normal.   Eyes:      General: No scleral icterus.        Right eye: No discharge.         Left eye: No discharge.      Conjunctiva/sclera: Conjunctivae normal.      Pupils: " Pupils are equal, round, and reactive to light.   Neck:      Musculoskeletal: Neck supple.      Vascular: No JVD.   Cardiovascular:      Rate and Rhythm: Normal rate and regular rhythm.      Heart sounds: Normal heart sounds. No murmur. No friction rub.   Pulmonary:      Effort: Pulmonary effort is normal. No respiratory distress.      Breath sounds: Normal breath sounds. No decreased breath sounds, wheezing, rhonchi or rales.   Abdominal:      General: Bowel sounds are normal. There is no distension.      Palpations: Abdomen is soft.      Tenderness: There is no abdominal tenderness. There is no guarding or rebound.   Lymphadenopathy:      Cervical: No cervical adenopathy.   Skin:     General: Skin is warm and dry.      Capillary Refill: Capillary refill takes less than 2 seconds.      Findings: No erythema or rash.   Neurological:      Mental Status: She is alert and oriented to person, place, and time.   Psychiatric:         Attention and Perception: Attention and perception normal.         Mood and Affect: Mood normal.         Behavior: Behavior normal.         Thought Content: Thought content normal.         Judgment: Judgment normal.       Assessment/Plan   Patient's Body mass index is 29.45 kg/m². BMI is above normal parameters. Recommendations include: exercise counseling and nutrition counseling.   (Normal BMI:  18.5-24.9, OW 25-29.9, Obesity 30 or greater)  Problems Addressed this Visit        Cardiovascular and Mediastinum    Mixed hyperlipidemia    Relevant Medications    icosapent ethyl (Vascepa) 1 g capsule capsule    Other Relevant Orders    Lipid Panel (Completed)    Essential hypertension    Relevant Orders    Comprehensive Metabolic Panel (Completed)       Digestive    Gastroesophageal reflux disease without esophagitis    Relevant Orders    CBC & Differential (Completed)    Magnesium (Completed)       Endocrine    Type 2 diabetes mellitus with peripheral neuropathy (CMS/HCC) - Primary    Relevant  Orders    Comprehensive Metabolic Panel (Completed)    Lipid Panel (Completed)    Hemoglobin A1c (Completed)    TSH (Completed)    Vitamin B12 (Completed)      Other Visit Diagnoses     Vitamin D deficiency        Vit D weekly     Relevant Medications    vitamin D (ERGOCALCIFEROL) 1.25 MG (27773 UT) capsule capsule    Other Relevant Orders    CBC & Differential (Completed)    Vitamin D 25 Hydroxy (Completed)      Findings and recommendations discussed with Jocelyn. Reviewed Diabetes Self Management Skills with Jocelyn with special focus on nutrition recommendations. She does report her morning blood sugars are generally above 250 mg/dL. Will have her to increase her Levemir to 40 units. She will continue 6 units pre meal for now but will probably need to be increased. Asked if she would do some 2-3 hour post meal blood glucose checks. Blood pressure is adequately controlled.Lifestyle recommendations encouraged including nutrition and exercise. She does have dogs that she walks at least twice a day. Labs ordered since she is over due for these to be done. Results will be reviewed with her by phone. She will f/u with me in two weeks;; sooner if problems/concerns.     This document has been electronically signed by BARBI Lombardo, TRINY-BC, ROJELIO  September 16, 2020 15:45 EDT

## 2020-09-16 NOTE — PATIENT INSTRUCTIONS
Type 2 Diabetes Mellitus, Self Care, Adult  Caring for yourself after you have been diagnosed with type 2 diabetes (type 2 diabetes mellitus) means keeping your blood sugar (glucose) under control with a balance of:  · Nutrition.  · Exercise.  · Lifestyle changes.  · Medicines or insulin, if necessary.  · Support from your team of health care providers and others.  The following information explains what you need to know to manage your diabetes at home.  What are the risks?  Having diabetes can put you at risk for other long-term (chronic) conditions, such as heart disease and kidney disease. Your health care provider may prescribe medicines to help prevent complications from diabetes. These medicines may include:  · Aspirin.  · Medicine to lower cholesterol.  · Medicine to control blood pressure.  How to monitor blood glucose    · Check your blood glucose every day, as often as told by your health care provider.  · Have your A1c (hemoglobin A1c) level checked two or more times a year, or as often as told by your health care provider.  Your health care provider will set individualized treatment goals for you. Generally, the goal of treatment is to maintain the following blood glucose levels:  · Before meals (preprandial):  mg/dL (4.4-7.2 mmol/L).  · After meals (postprandial): below 180 mg/dL (10 mmol/L).  · A1c level: less than 7%.  How to manage hyperglycemia and hypoglycemia  Hyperglycemia symptoms  Hyperglycemia, also called high blood glucose, occurs when blood glucose is too high. Make sure you know the early signs of hyperglycemia, such as:  · Increased thirst.  · Hunger.  · Feeling very tired.  · Needing to urinate more often than usual.  · Blurry vision.  Hypoglycemia symptoms  Hypoglycemia, also called low blood glucose, occurs with a blood glucose level at or below 70 mg/dL (3.9 mmol/L). The risk for hypoglycemia increases during or after exercise, during sleep, during illness, and when skipping  meals or not eating for a long time (fasting).  It is important to know the symptoms of hypoglycemia and treat it right away. Always have a 15-gram rapid-acting carbohydrate snack with you to treat low blood glucose. Family members and close friends should also know the symptoms and should understand how to treat hypoglycemia, in case you are not able to treat yourself. Symptoms may include:  · Hunger.  · Anxiety.  · Sweating and feeling clammy.  · Confusion.  · Dizziness or feeling light-headed.  · Sleepiness.  · Nausea.  · Increased heart rate.  · Headache.  · Blurry vision.  · Irritability.  · A change in coordination.  · Tingling or numbness around the mouth, lips, or tongue.  · Restless sleep.  · Fainting.  · Seizure.  Treating hypoglycemia  If you are alert and able to swallow safely, follow the 15:15 rule:  · Take 15 grams of a rapid-acting carbohydrate. Talk with your health care provider about how much you should take.  · Rapid-acting options include:  ? Glucose pills (take 15 grams).  ? 6-8 pieces of hard candy.  ? 4-6 oz (120-150 mL) of fruit juice.  ? 4-6 oz (120-150 mL) of regular (not diet) soda.  ? 1 Tbsp (15 mL) honey or sugar.  · Check your blood glucose 15 minutes after you take the carbohydrate.  · If the repeat blood glucose level is still at or below 70 mg/dL (3.9 mmol/L), take 15 grams of a carbohydrate again.  · If your blood glucose level does not increase above 70 mg/dL (3.9 mmol/L) after 3 tries, seek emergency medical care.  · After your blood glucose level returns to normal, eat a meal or a snack within 1 hour.  Treating severe hypoglycemia  Severe hypoglycemia is when your blood glucose level is at or below 54 mg/dL (3 mmol/L). Severe hypoglycemia is an emergency. Do not wait to see if the symptoms will go away. Get medical help right away. Call your local emergency services (911 in the [place]  If you have severe hypoglycemia and you cannot eat or drink, you may need an injection of  glucagon. A family member or close friend should learn how to check your blood glucose and how to give you a glucagon injection. Ask your health care provider if you need to have an emergency glucagon injection kit available.  Severe hypoglycemia may need to be treated in a hospital. The treatment may include getting glucose through an IV. You may also need treatment for the cause of your hypoglycemia.  Follow these instructions at home:  Take diabetes medicines as told  · If your health care provider prescribed insulin or diabetes medicines, take them every day.  · Do not run out of insulin or other diabetes medicines that you take. Plan ahead so you always have these available.  · If you use insulin, adjust your dosage based on how physically active you are and what foods you eat. Your health care provider will tell you how to adjust your dosage.  Make healthy food choices    The things that you eat and drink affect your blood glucose and your insulin dosage. Making good choices helps to control your diabetes and prevent other health problems. A healthy meal plan includes eating lean proteins, complex carbohydrates, fresh fruits and vegetables, low-fat dairy products, and healthy fats.  Make an appointment to see a diet and nutrition specialist (registered dietitian) to help you create an eating plan that is right for you. Make sure that you:  · Follow instructions from your health care provider about eating or drinking restrictions.  · Drink enough fluid to keep your urine pale yellow.  · Keep a record of the carbohydrates that you eat. Do this by reading food labels and learning the standard serving sizes of foods.  · Follow your sick day plan whenever you cannot eat or drink as usual. Make this plan in advance with your health care provider.    Stay active  Exercise regularly, as told by your health care provider. This may include:  · Stretching and doing strength exercises, such as yoga or weightlifting, 2 or  more times a week.  · Doing 150 minutes or more of moderate-intensity or vigorous-intensity exercise each week. This could be brisk walking, biking, or water aerobics.  ? Spread out your activity over 3 or more days of the week.  ? Do not go more than 2 days in a row without doing some kind of physical activity.  When you start a new exercise or activity, work with your health care provider to adjust your insulin, medicines, or food intake as needed.  Make healthy lifestyle choices  · Do not use any tobacco products, such as cigarettes, chewing tobacco, and e-cigarettes. If you need help quitting, ask your health care provider.  · If your health care provider says that alcohol is safe for you, limit alcohol intake to no more than 1 drink per day for nonpregnant women and 2 drinks per day for men. One drink equals 12 oz of beer (355 mL), 5 oz of wine (148 mL), or 1½ oz of hard liquor (44 mL).  · Learn to manage stress. If you need help with this, ask your health care provider.  Care for your body    · Keep your immunizations up to date. In addition to getting vaccinations as told by your health care provider, it is recommended that you get vaccinated against the following illnesses:  ? The flu (influenza). Get a flu shot every year.  ? Pneumonia.  ? Hepatitis B.  · Schedule an eye exam soon after your diagnosis, and then one time every year after that.  · Check your skin and feet every day for cuts, bruises, redness, blisters, or sores. Schedule a foot exam with your health care provider once every year.  · Brush your teeth and gums two times a day, and floss one or more times a day. Visit your dentist one or more times every 6 months.  · Maintain a healthy weight.  General instructions  · Take over-the-counter and prescription medicines only as told by your health care provider.  · Share your diabetes management plan with people in your workplace, school, and household.  · Carry a medical alert card or wear medical  alert mirlande.  · Keep all follow-up visits as told by your health care provider. This is important.  Questions to ask your health care provider  · Do I need to meet with a diabetes educator?  · Where can I find a support group for people with diabetes?  Where to find more information  For more information about diabetes, visit:  · American Diabetes Association (ADA): www.diabetes.org  · American Association of Diabetes Educators (AADE): www.diabeteseducator.org  Summary  · Caring for yourself after you have been diagnosed with (type 2 diabetes mellitus) means keeping your blood sugar (glucose) under control with a balance of nutrition, exercise, lifestyle changes, and medicine.  · Check your blood glucose every day, as often as told by your health care provider.  · Having diabetes can put you at risk for other long-term (chronic) conditions, such as heart disease and kidney disease. Your health care provider may prescribe medicines to help prevent complications from diabetes.  · Keep all follow-up visits as told by your health care provider. This is important.  This information is not intended to replace advice given to you by your health care provider. Make sure you discuss any questions you have with your health care provider.  Document Released: 04/10/2017 Document Revised: 06/10/2019 Document Reviewed: 01/20/2017  [employer] Patient Education © 2020 [employer]

## 2020-09-17 LAB
25(OH)D3+25(OH)D2 SERPL-MCNC: 7.8 NG/ML (ref 30–100)
ALBUMIN SERPL-MCNC: 3.9 G/DL (ref 3.5–5.2)
ALBUMIN/GLOB SERPL: 1.1 G/DL
ALP SERPL-CCNC: 260 U/L (ref 39–117)
ALT SERPL-CCNC: 55 U/L (ref 1–33)
AST SERPL-CCNC: 20 U/L (ref 1–32)
BASOPHILS # BLD AUTO: NORMAL 10*3/UL
BILIRUB SERPL-MCNC: 0.2 MG/DL (ref 0–1.2)
BUN SERPL-MCNC: 28 MG/DL (ref 6–20)
BUN/CREAT SERPL: 18.1 (ref 7–25)
CALCIUM SERPL-MCNC: 8.7 MG/DL (ref 8.6–10.5)
CHLORIDE SERPL-SCNC: 91 MMOL/L (ref 98–107)
CHOLEST SERPL-MCNC: 438 MG/DL (ref 0–200)
CO2 SERPL-SCNC: 24.7 MMOL/L (ref 22–29)
CREAT SERPL-MCNC: 1.55 MG/DL (ref 0.57–1)
EOSINOPHIL # BLD AUTO: NORMAL 10*3/UL
EOSINOPHIL NFR BLD AUTO: NORMAL %
GLOBULIN SER CALC-MCNC: 3.5 GM/DL
GLUCOSE SERPL-MCNC: 326 MG/DL (ref 65–99)
HBA1C MFR BLD: NORMAL %
HCT VFR BLD AUTO: NORMAL %
HDLC SERPL-MCNC: ABNORMAL MG/DL
HGB BLD-MCNC: NORMAL G/DL
LDLC SERPL CALC-MCNC: ABNORMAL MG/DL
LYMPHOCYTES # BLD AUTO: NORMAL 10*3/UL
LYMPHOCYTES NFR BLD AUTO: NORMAL %
MAGNESIUM SERPL-MCNC: 1.9 MG/DL (ref 1.6–2.6)
MONOCYTES NFR BLD AUTO: NORMAL %
NEUTROPHILS NFR BLD AUTO: NORMAL %
PLATELET # BLD AUTO: NORMAL 10*3/UL
POTASSIUM SERPL-SCNC: 4.7 MMOL/L (ref 3.5–5.2)
PROT SERPL-MCNC: 7.4 G/DL (ref 6–8.5)
RBC # BLD AUTO: NORMAL 10*6/UL
REQUEST PROBLEM: NORMAL
SODIUM SERPL-SCNC: 129 MMOL/L (ref 136–145)
SPECIMEN STATUS: NORMAL
TRIGL SERPL-MCNC: 2661 MG/DL (ref 0–150)
TSH SERPL DL<=0.005 MIU/L-ACNC: 3.01 UIU/ML (ref 0.27–4.2)
VIT B12 SERPL-MCNC: 369 PG/ML (ref 211–946)
VLDLC SERPL CALC-MCNC: ABNORMAL MG/DL
WBC # BLD AUTO: NORMAL 10*3/MM3

## 2020-09-17 RX ORDER — ERGOCALCIFEROL 1.25 MG/1
50000 CAPSULE ORAL
Qty: 5 CAPSULE | Refills: 3 | Status: SHIPPED | OUTPATIENT
Start: 2020-09-17 | End: 2021-03-10 | Stop reason: SDUPTHER

## 2020-09-17 RX ORDER — ICOSAPENT ETHYL 1000 MG/1
2 CAPSULE ORAL 2 TIMES DAILY WITH MEALS
Qty: 120 CAPSULE | Refills: 3 | Status: SHIPPED | OUTPATIENT
Start: 2020-09-17 | End: 2021-03-04 | Stop reason: SDUPTHER

## 2020-09-24 RX ORDER — PROMETHAZINE HYDROCHLORIDE 25 MG/1
25 TABLET ORAL EVERY 6 HOURS PRN
Qty: 30 TABLET | Refills: 0 | Status: SHIPPED | OUTPATIENT
Start: 2020-09-24 | End: 2020-10-16 | Stop reason: SDUPTHER

## 2020-10-08 ENCOUNTER — TELEPHONE (OUTPATIENT)
Dept: FAMILY MEDICINE CLINIC | Facility: CLINIC | Age: 55
End: 2020-10-08

## 2020-10-08 NOTE — TELEPHONE ENCOUNTER
Pt is aware of this information.       ----- Message from Miki Christianson MD sent at 10/8/2020 10:25 AM EDT -----  Emailed  ----- Message -----  From: Mary Liao MA  Sent: 10/8/2020  10:03 AM EDT  To: Miki Christianson MD    Pt called and stated that when she picked up her levemir yesterday it was vials instead of pens. She wanted to know if you could send the pens in instead?

## 2020-10-16 ENCOUNTER — TELEPHONE (OUTPATIENT)
Dept: FAMILY MEDICINE CLINIC | Facility: CLINIC | Age: 55
End: 2020-10-16

## 2020-10-16 RX ORDER — PROMETHAZINE HYDROCHLORIDE 25 MG/1
25 TABLET ORAL EVERY 6 HOURS PRN
Qty: 30 TABLET | Refills: 0 | Status: SHIPPED | OUTPATIENT
Start: 2020-10-16 | End: 2020-11-03 | Stop reason: SDUPTHER

## 2020-10-16 NOTE — TELEPHONE ENCOUNTER
----- Message from Miki Christianson MD sent at 10/16/2020  1:28 PM EDT -----  Emailed  ----- Message -----  From: Mary Liao MA  Sent: 10/16/2020  10:52 AM EDT  To: Miki Christianson MD    Patient called in request a prescription for phenergan.

## 2020-10-30 RX ORDER — FLUCONAZOLE 150 MG/1
150 TABLET ORAL DAILY
Qty: 3 TABLET | Refills: 0 | Status: SHIPPED | OUTPATIENT
Start: 2020-10-30 | End: 2020-11-02

## 2020-11-03 RX ORDER — PROMETHAZINE HYDROCHLORIDE 25 MG/1
25 TABLET ORAL EVERY 6 HOURS PRN
Qty: 30 TABLET | Refills: 0 | Status: SHIPPED | OUTPATIENT
Start: 2020-11-03 | End: 2020-11-30

## 2020-11-03 RX ORDER — BENZONATATE 100 MG/1
100 CAPSULE ORAL 3 TIMES DAILY PRN
Qty: 30 CAPSULE | Refills: 0 | Status: SHIPPED | OUTPATIENT
Start: 2020-11-03 | End: 2020-12-30 | Stop reason: SDUPTHER

## 2020-11-06 DIAGNOSIS — K21.9 GASTROESOPHAGEAL REFLUX DISEASE WITHOUT ESOPHAGITIS: Chronic | ICD-10-CM

## 2020-11-07 RX ORDER — PANTOPRAZOLE SODIUM 40 MG/1
40 TABLET, DELAYED RELEASE ORAL DAILY
Qty: 30 TABLET | Refills: 5 | Status: SHIPPED | OUTPATIENT
Start: 2020-11-07 | End: 2021-03-04 | Stop reason: SDUPTHER

## 2020-11-09 RX ORDER — SULFAMETHOXAZOLE AND TRIMETHOPRIM 800; 160 MG/1; MG/1
1 TABLET ORAL 2 TIMES DAILY
Qty: 10 TABLET | Refills: 0 | Status: SHIPPED | OUTPATIENT
Start: 2020-11-09 | End: 2020-11-14

## 2020-11-30 RX ORDER — PROMETHAZINE HYDROCHLORIDE 25 MG/1
TABLET ORAL
Qty: 30 TABLET | Refills: 0 | Status: SHIPPED | OUTPATIENT
Start: 2020-11-30 | End: 2020-12-15 | Stop reason: SDUPTHER

## 2020-12-07 DIAGNOSIS — Z89.432 HISTORY OF TRANSMETATARSAL AMPUTATION OF LEFT FOOT (HCC): ICD-10-CM

## 2020-12-07 DIAGNOSIS — G89.4 CHRONIC PAIN SYNDROME: Primary | ICD-10-CM

## 2020-12-07 DIAGNOSIS — E11.42 TYPE 2 DIABETES MELLITUS WITH PERIPHERAL NEUROPATHY (HCC): ICD-10-CM

## 2020-12-15 RX ORDER — PROMETHAZINE HYDROCHLORIDE 25 MG/1
25 TABLET ORAL EVERY 6 HOURS PRN
Qty: 30 TABLET | Refills: 0 | Status: SHIPPED | OUTPATIENT
Start: 2020-12-15 | End: 2020-12-16 | Stop reason: SDUPTHER

## 2020-12-15 NOTE — TELEPHONE ENCOUNTER
Caller: Dewey Jocelyn    Relationship: Self    Best call back number: [phone]    Medication needed:   Requested Prescriptions     Pending Prescriptions Disp Refills   • promethazine (PHENERGAN) 25 MG tablet 30 tablet 0     What details did the patient provide when requesting the medication: PATIENT IS OUT OF MEDICATION AND HAS BEEN THROWING UP ALL MORNING     Does the patient have less than a 3 day supply:  [x] Yes  [] No    What is the patient's preferred pharmacy: [address] - [phone] [facility] [phone] FX

## 2020-12-16 NOTE — TELEPHONE ENCOUNTER
Caller: Dewey Jocelyn    Relationship: Self    Best call back number: [phone]  Medication needed:   Requested Prescriptions     Pending Prescriptions Disp Refills   • promethazine (PHENERGAN) 25 MG tablet 30 tablet 0     Sig: Take 1 tablet by mouth Every 6 (Six) Hours As Needed for Nausea or Vomiting.       When do you need the refill by: ASAP    What details did the patient provide when requesting the medication: OUT OF RX    Does the patient have less than a 3 day supply:  [x] Yes  [] No    What is the patient's preferred pharmacy:      [address] - [phone]  - [phone] FX

## 2020-12-28 ENCOUNTER — TELEPHONE (OUTPATIENT)
Dept: FAMILY MEDICINE CLINIC | Facility: CLINIC | Age: 55
End: 2020-12-28

## 2020-12-28 DIAGNOSIS — R52 PAIN: Primary | ICD-10-CM

## 2020-12-28 RX ORDER — PROMETHAZINE HYDROCHLORIDE 25 MG/1
25 TABLET ORAL EVERY 6 HOURS PRN
Qty: 30 TABLET | Refills: 0 | Status: SHIPPED | OUTPATIENT
Start: 2020-12-28 | End: 2021-01-20 | Stop reason: SDUPTHER

## 2020-12-28 NOTE — TELEPHONE ENCOUNTER
PATIENT CALLED TO CHECK THE STATUS OF HER REQUEST FOR A REFILL FOR PHENERGAN.    PATIENT'S CONTACT [phone]    no cough/no fever

## 2020-12-28 NOTE — TELEPHONE ENCOUNTER
Patient states that her pain doctor is no longer practicing and has given her another name to ask for a referral to. Patient would like a referral written to [facility] to Dr. Rodger Rdz PH: [phone]      Patient: [phone]

## 2020-12-30 RX ORDER — BENZONATATE 100 MG/1
100 CAPSULE ORAL 3 TIMES DAILY PRN
Qty: 30 CAPSULE | Refills: 0 | Status: SHIPPED | OUTPATIENT
Start: 2020-12-30 | End: 2021-03-10

## 2020-12-30 NOTE — TELEPHONE ENCOUNTER
Caller: Jocelyn Palomo    Relationship: Self    Best call back number: [phone]    Medication needed:   Requested Prescriptions     Pending Prescriptions Disp Refills   • benzonatate (TESSALON) 100 MG capsule 30 capsule 0     Sig: Take 1 capsule by mouth 3 (Three) Times a Day As Needed for Cough.       When do you need the refill by: 12/31/20    What details did the patient provide when requesting the medication: patient also has a yeast infection and would need some diclovan called in also    Does the patient have less than a 3 day supply:  [x] Yes  [] No    What is the patient's preferred pharmacy: [place], [place] - [address] [place] [address] 1 - [phone]  - [phone] FX

## 2020-12-31 ENCOUNTER — DOCUMENTATION (OUTPATIENT)
Dept: FAMILY MEDICINE CLINIC | Facility: CLINIC | Age: 55
End: 2020-12-31

## 2020-12-31 RX ORDER — FLUCONAZOLE 150 MG/1
150 TABLET ORAL DAILY
Qty: 3 TABLET | Refills: 0 | Status: SHIPPED | OUTPATIENT
Start: 2020-12-31 | End: 2021-01-03

## 2021-01-04 ENCOUNTER — TELEPHONE (OUTPATIENT)
Dept: FAMILY MEDICINE CLINIC | Facility: CLINIC | Age: 56
End: 2021-01-04

## 2021-01-04 NOTE — TELEPHONE ENCOUNTER
Chart reviewed for RICARDO follow-up. No ER or urgent center visits noted. RICARDO episode resolved. Patient states that she would like to see if Dr. Monique would call in an antibiotic for her cough.  Her pharmacy is [facility].  She can be reached at [phone]

## 2021-01-04 NOTE — TELEPHONE ENCOUNTER
----- Message from BARBI Mast sent at 12/31/2020  7:55 PM EST -----  Regarding: RE:  Sent  ----- Message -----  From: Mary Liao MA  Sent: 12/31/2020   8:13 AM EST  To: BARBI Mast    Pt has a yeast infection and wanted to know if you could send her in some diflucan?

## 2021-01-05 ENCOUNTER — OFFICE VISIT (OUTPATIENT)
Dept: FAMILY MEDICINE CLINIC | Facility: CLINIC | Age: 56
End: 2021-01-05

## 2021-01-05 VITALS — HEIGHT: 65 IN | BODY MASS INDEX: 29.49 KG/M2 | WEIGHT: 177 LBS

## 2021-01-05 DIAGNOSIS — R05.9 COUGH: Primary | ICD-10-CM

## 2021-01-05 DIAGNOSIS — F17.200 CURRENT SMOKER: ICD-10-CM

## 2021-01-05 PROCEDURE — 99442 PR PHYS/QHP TELEPHONE EVALUATION 11-20 MIN: CPT | Performed by: NURSE PRACTITIONER

## 2021-01-05 NOTE — PROGRESS NOTES
You have chosen to receive care through a telephone visit. Do you consent to use a telephone visit for your medical care today? Yes    Jocelyn Palomo is a 55 y.o. female who is c/o a nonproductive cough which started over a week ago. She has Tessalon Perles without relief.      Cough  The current episode started 1 to 4 weeks ago. The cough is non-productive. Pertinent negatives include no chest pain, chills, ear pain, fever, heartburn, hemoptysis, nasal congestion, postnasal drip, rash, rhinorrhea, sore throat, shortness of breath or wheezing. The symptoms are aggravated by lying down. Risk factors for lung disease include smoking/tobacco exposure. She has tried OTC cough suppressant for the symptoms. The treatment provided no relief.      The following portions of the patient's history were reviewed and updated as appropriate: allergies, current medications, past family history, past medical history, past social history, past surgical history and problem list.    Current Outpatient Medications:   •  acetaminophen (TYLENOL) 325 MG tablet, Take 650 mg by mouth As Needed for Mild Pain ., Disp: , Rfl:   •  benzonatate (TESSALON) 100 MG capsule, Take 1 capsule by mouth 3 (Three) Times a Day As Needed for Cough., Disp: 30 capsule, Rfl: 0  •  Blood Glucose Monitoring Suppl (BLOOD GLUCOSE MONITOR SYSTEM) w/Device kit, 1 Device 4 (Four) Times a Day., Disp: 1 each, Rfl: 0  •  gabapentin (NEURONTIN) 300 MG capsule, Take 800 mg by mouth 3 (Three) Times a Day., Disp: , Rfl:   •  glucose blood test strip, 1 each by Other route 3 (Three) Times a Day., Disp: 100 each, Rfl: 2  •  icosapent ethyl (Vascepa) 1 g capsule capsule, Take 2 g by mouth 2 (Two) Times a Day With Meals., Disp: 120 capsule, Rfl: 3  •  insulin aspart (novoLOG FLEXPEN) 100 UNIT/ML solution pen-injector sc pen, 6 units SC before each meal, Disp: 6 pen, Rfl: 0  •  insulin detemir (LEVEMIR) 100 UNIT/ML injection, Inject 40 Units under the skin into the appropriate  area as directed Daily., Disp: 5 pen, Rfl: 5  •  Insulin Pen Needle (B-D UF III MINI PEN NEEDLES) 31G X 5 MM misc, USE 4 TIMES DAILY WITH INSULIN, Disp: 150 each, Rfl: 0  •  Insulin Pen Needle 32G X 4 MM misc, 1 each 4 (Four) Times a Day., Disp: 120 each, Rfl: 5  •  Lancets Ultra Fine misc, 1 Device 3 (Three) Times a Day With Meals., Disp: 100 each, Rfl: 2  •  lisinopril-hydrochlorothiazide (PRINZIDE,ZESTORETIC) 20-25 MG per tablet, Take 1 tablet by mouth Daily., Disp: , Rfl:   •  LORazepam (ATIVAN) 1 MG tablet, , Disp: , Rfl:   •  Omega 3 1000 MG capsule, Take 2,000 mg by mouth 2 (Two) Times a Day., Disp: 120 each, Rfl: 0  •  oxyCODONE (ROXICODONE) 15 MG immediate release tablet, Take 15 mg by mouth Every 4 (Four) Hours As Needed for Moderate Pain ., Disp: , Rfl:   •  pantoprazole (PROTONIX) 40 MG EC tablet, Take 1 tablet by mouth Daily., Disp: 30 tablet, Rfl: 5  •  potassium chloride (K-DUR,KLOR-CON) 10 MEQ CR tablet, Take 1 tablet by mouth Daily., Disp: 30 tablet, Rfl: 0  •  promethazine (PHENERGAN) 25 MG tablet, Take 1 tablet by mouth Every 6 (Six) Hours As Needed for Nausea or Vomiting., Disp: 30 tablet, Rfl: 0  •  rosuvastatin (CRESTOR) 40 MG tablet, Take 1 tablet by mouth Every Night., Disp: 30 tablet, Rfl: 0  •  vitamin D (ERGOCALCIFEROL) 1.25 MG (42008 UT) capsule capsule, Take 1 capsule by mouth Every 7 (Seven) Days., Disp: 5 capsule, Rfl: 3  •  albuterol (PROVENTIL,VENTOLIN) 2 MG/5ML syrup, Take 5-10 mL by mouth Every 6 (Six) Hours As Needed (cough)., Disp: 60 mL, Rfl: 0    No Known Allergies    Review of Systems   Constitutional: Negative for activity change, appetite change, chills, fatigue and fever.   HENT: Negative for congestion, ear pain, postnasal drip, rhinorrhea, sinus pressure and sore throat.         Negative for loss of smell /taste   Respiratory: Positive for cough. Negative for hemoptysis, shortness of breath and wheezing.    Cardiovascular: Negative for chest pain.   Gastrointestinal:  "Negative for heartburn, nausea and vomiting.   Skin: Negative for color change and rash.   Hematological: Negative for adenopathy.     Visit Vitals  Ht 165.1 cm (65\")   Wt 80.3 kg (177 lb)   BMI 29.45 kg/m²     Physical Exam  Constitutional:       General: She is not in acute distress.     Comments: Pleasant; Coughing during visit   Pulmonary:      Effort: No respiratory distress.   Neurological:      Mental Status: She is alert.   Psychiatric:         Mood and Affect: Mood normal.         Speech: Speech normal.         Behavior: Behavior is cooperative.         Cognition and Memory: Cognition normal.       Assessment/Plan   Diagnoses and all orders for this visit:    1. Cough (Primary)  Comments:  Symptoms and treatment options discussed.   Orders:  -     albuterol (PROVENTIL,VENTOLIN) 2 MG/5ML syrup; Take 5-10 mL by mouth Every 6 (Six) Hours As Needed (cough).  Dispense: 60 mL; Refill: 0    2. Current smoker  Comments:  Smoking cessation encouraged      Symptoms and treatment options discussed. Three way cough syrup prescribed. S/S of concern reviewed and if occur to seek further medical evaluation.   This visit has been scheduled as a phone visit to comply with patient safety concerns in accordance with CDC recommendations. Total time of discussion was 12 minutes.          This document has been electronically signed by BARBI Lombardo, JOSUE, ROJELIO  January 5, 2021 18:59 EST             "

## 2021-01-07 ENCOUNTER — TELEPHONE (OUTPATIENT)
Dept: FAMILY MEDICINE CLINIC | Facility: CLINIC | Age: 56
End: 2021-01-07

## 2021-01-07 NOTE — TELEPHONE ENCOUNTER
DEYANIRA IS ASKING FOR A REFERRAL TO DR KELLY TOLBERT FOR HER BACK.  [phone].  [address] [place] [address].    HER PHONE -[phone].

## 2021-01-19 ENCOUNTER — TELEPHONE (OUTPATIENT)
Dept: FAMILY MEDICINE CLINIC | Facility: CLINIC | Age: 56
End: 2021-01-19

## 2021-01-20 DIAGNOSIS — G89.4 CHRONIC PAIN SYNDROME: Primary | ICD-10-CM

## 2021-01-20 RX ORDER — PROMETHAZINE HYDROCHLORIDE 25 MG/1
25 TABLET ORAL EVERY 6 HOURS PRN
Qty: 30 TABLET | Refills: 0 | Status: SHIPPED | OUTPATIENT
Start: 2021-01-20 | End: 2021-02-17 | Stop reason: SDUPTHER

## 2021-01-20 NOTE — TELEPHONE ENCOUNTER
Caller: Dewey Jocelyn    Relationship: Self    Best call back number: [phone]    Medication needed:   Requested Prescriptions     Pending Prescriptions Disp Refills   • promethazine (PHENERGAN) 25 MG tablet 30 tablet 0     Sig: Take 1 tablet by mouth Every 6 (Six) Hours As Needed for Nausea or Vomiting.       Does the patient have less than a 3 day supply:  [] Yes  [x] No    What is the patient's preferred pharmacy: [place] [address] - [phone]  - [phone]

## 2021-02-17 RX ORDER — PROMETHAZINE HYDROCHLORIDE 25 MG/1
25 TABLET ORAL EVERY 6 HOURS PRN
Qty: 30 TABLET | Refills: 0 | Status: SHIPPED | OUTPATIENT
Start: 2021-02-17 | End: 2021-03-22 | Stop reason: SDUPTHER

## 2021-02-17 NOTE — TELEPHONE ENCOUNTER
Caller: Dewey Jocelyn    Relationship: Self    Best call back number:[phone]    Medication needed:   Requested Prescriptions     Pending Prescriptions Disp Refills   • promethazine (PHENERGAN) 25 MG tablet 30 tablet 0     Sig: Take 1 tablet by mouth Every 6 (Six) Hours As Needed for Nausea or Vomiting.       When do you need the refill by: 2/17/21    What details did the patient provide when requesting the medication:     Does the patient have less than a 3 day supply:  [x] Yes  [] No    What is the patient's preferred pharmacy: [address] - [phone]  - [phone] FX         .”

## 2021-03-03 RX ORDER — FLURBIPROFEN SODIUM 0.3 MG/ML
SOLUTION/ DROPS OPHTHALMIC
Qty: 150 EACH | Refills: 0 | Status: SHIPPED | OUTPATIENT
Start: 2021-03-03 | End: 2022-07-22

## 2021-03-03 NOTE — TELEPHONE ENCOUNTER
PT CALLED TO REQUEST REFILL FOR RX  insulin detemir (LEVEMIR) 100 UNIT/ML injection  AND  DIFLUCAN.    PLEASE ADVISE.  CALL BACK:[phone]    [facility] - [place], [place] - [address] [place] [address] [place] [address]

## 2021-03-04 ENCOUNTER — OFFICE VISIT (OUTPATIENT)
Dept: FAMILY MEDICINE CLINIC | Facility: CLINIC | Age: 56
End: 2021-03-04

## 2021-03-04 VITALS
TEMPERATURE: 97.3 F | HEART RATE: 83 BPM | RESPIRATION RATE: 14 BRPM | HEIGHT: 65 IN | OXYGEN SATURATION: 91 % | BODY MASS INDEX: 29.45 KG/M2 | SYSTOLIC BLOOD PRESSURE: 140 MMHG | DIASTOLIC BLOOD PRESSURE: 80 MMHG

## 2021-03-04 DIAGNOSIS — E78.2 MIXED HYPERLIPIDEMIA: Chronic | ICD-10-CM

## 2021-03-04 DIAGNOSIS — N28.9 RENAL INSUFFICIENCY: ICD-10-CM

## 2021-03-04 DIAGNOSIS — E11.42 TYPE 2 DIABETES MELLITUS WITH PERIPHERAL NEUROPATHY (HCC): Primary | Chronic | ICD-10-CM

## 2021-03-04 DIAGNOSIS — I10 ESSENTIAL HYPERTENSION: Chronic | ICD-10-CM

## 2021-03-04 DIAGNOSIS — E55.9 VITAMIN D DEFICIENCY: ICD-10-CM

## 2021-03-04 DIAGNOSIS — B37.9 CANDIDIASIS: ICD-10-CM

## 2021-03-04 DIAGNOSIS — K21.9 GASTROESOPHAGEAL REFLUX DISEASE WITHOUT ESOPHAGITIS: Chronic | ICD-10-CM

## 2021-03-04 PROCEDURE — 99214 OFFICE O/P EST MOD 30 MIN: CPT | Performed by: NURSE PRACTITIONER

## 2021-03-04 PROCEDURE — 90686 IIV4 VACC NO PRSV 0.5 ML IM: CPT | Performed by: NURSE PRACTITIONER

## 2021-03-04 PROCEDURE — G0008 ADMIN INFLUENZA VIRUS VAC: HCPCS | Performed by: NURSE PRACTITIONER

## 2021-03-04 PROCEDURE — 36415 COLL VENOUS BLD VENIPUNCTURE: CPT | Performed by: NURSE PRACTITIONER

## 2021-03-04 RX ORDER — LISINOPRIL AND HYDROCHLOROTHIAZIDE 25; 20 MG/1; MG/1
1 TABLET ORAL DAILY
Qty: 30 TABLET | Refills: 3 | Status: SHIPPED | OUTPATIENT
Start: 2021-03-04 | End: 2022-01-13 | Stop reason: SDUPTHER

## 2021-03-04 RX ORDER — FLUCONAZOLE 150 MG/1
150 TABLET ORAL DAILY
Qty: 3 TABLET | Refills: 0 | Status: SHIPPED | OUTPATIENT
Start: 2021-03-04 | End: 2021-03-07

## 2021-03-04 RX ORDER — ICOSAPENT ETHYL 1000 MG/1
2 CAPSULE ORAL 2 TIMES DAILY WITH MEALS
Qty: 120 CAPSULE | Refills: 3 | Status: SHIPPED | OUTPATIENT
Start: 2021-03-04 | End: 2022-01-13

## 2021-03-04 RX ORDER — ROSUVASTATIN CALCIUM 40 MG/1
40 TABLET, COATED ORAL NIGHTLY
Qty: 30 TABLET | Refills: 3 | Status: SHIPPED | OUTPATIENT
Start: 2021-03-04 | End: 2022-01-13 | Stop reason: SDUPTHER

## 2021-03-04 RX ORDER — ACETAMINOPHEN 650 MG/1
TABLET, FILM COATED, EXTENDED RELEASE ORAL
COMMUNITY
Start: 2021-03-01 | End: 2022-04-09

## 2021-03-04 RX ORDER — PANTOPRAZOLE SODIUM 40 MG/1
40 TABLET, DELAYED RELEASE ORAL DAILY
Qty: 30 TABLET | Refills: 3 | Status: SHIPPED | OUTPATIENT
Start: 2021-03-04 | End: 2021-07-07

## 2021-03-04 RX ORDER — POTASSIUM CHLORIDE 750 MG/1
10 TABLET, EXTENDED RELEASE ORAL DAILY
Qty: 30 TABLET | Refills: 3 | Status: SHIPPED | OUTPATIENT
Start: 2021-03-04 | End: 2022-01-13

## 2021-03-04 RX ORDER — GABAPENTIN 600 MG/1
600 TABLET ORAL 3 TIMES DAILY
COMMUNITY
Start: 2021-02-23 | End: 2022-04-09

## 2021-03-04 NOTE — PATIENT INSTRUCTIONS
Living With Diabetes  Diabetes (type 1 diabetes mellitus or type 2 diabetes mellitus) is a condition in which the body does not have enough of a hormone called insulin, or the body does not respond properly to insulin. Normally, insulin allows sugars (glucose) to enter cells in the body. The cells use glucose for energy. With diabetes, extra glucose builds up in the blood instead of going into cells, which results in high blood glucose (hyperglycemia).  How to manage lifestyle changes  Managing diabetes includes medical treatments as well as lifestyle changes. If diabetes is not managed well, serious physical and emotional complications can occur. Taking good care of yourself means that you are responsible for:  · Monitoring glucose regularly.  · Eating a healthy diet.  · Exercising regularly.  · Meeting with health care providers.  · Taking medicines as directed.  Some people may feel a lot of stress about managing their diabetes. This is known as emotional distress, and it is very common. Living with diabetes can place you at risk for emotional distress, depression, or anxiety. These disorders can be confusing and can make diabetes management more difficult.  How to recognize stress  Emotional distress  Symptoms of emotional distress include:  · Anger about having a diagnosis of diabetes.  · Fear or frustration about your diagnosis and the changes you need to make to manage the condition.  · Being overly worried about the care that you need or the cost of the care that you need.  · Feeling like you caused your condition by doing something wrong.  · Fear of unpredictable situations, like low or high blood glucose.  · Feeling judged by your health care providers.  · Feeling very alone with the disease.  · Getting too tired or worn out with the demands of daily care.  Depression  Having diabetes means that you are at a higher risk for depression. Having depression also means that you are at a higher risk for  diabetes. Your health care provider may test (screen) you for symptoms of depression. It is important to recognize depression symptoms and to start treatment for depression soon after it is diagnosed. The following are some symptoms of depression:  · Loss of interest in things that you used to enjoy.  · Trouble sleeping, or often waking up early and not being able to get back to sleep.  · A change in appetite.  · Feeling tired most of the day.  · Feeling nervous and anxious.  · Feeling guilty and worrying that you are a burden to others.  · Feeling depressed more often than you do not feel that way.  · Thoughts of hurting yourself or feeling that you want to die.  If you have any of these symptoms for 2 weeks or longer, reach out to a health care provider.  Follow these instructions at home:  Managing emotional distress  The following are some ways to manage emotional distress:  · Talk with your health care provider or certified diabetes educator. Consider working with a counselor or therapist.  · Learn as much as you can about diabetes and its treatment. Meet with a certified diabetes educator or take a class to learn how to manage your condition.  · Keep a journal of your thoughts and concerns.  · Accept that some things are out of your control.  · Talk with other people who have diabetes. It can help to talk with others about the emotional distress that you feel.  · Find ways to manage stress that work for you. These may include art or music therapy, exercise, meditation, and hobbies.  · Seek support from spiritual leaders, family, and friends.  General instructions  · Follow your diabetes management plan.  · Keep all follow-up visits as told by your health care provider. This is important.  Where to find support    · Ask your health care provider to recommend a therapist who understands both depression and diabetes.  · Search for information and support from the American Diabetes Association:  www.diabetes.org  · Find a certified diabetes educator and make an appointment through American Association of Diabetes Educators: www.diabeteseducator.org  Get help right away if:  · You have thoughts about hurting yourself or others.  If you ever feel like you may hurt yourself or others, or have thoughts about taking your own life, get help right away. You can go to your nearest emergency department or call:  · Your local emergency services (911 in the [place]  · A suicide crisis helpline, such as the National Suicide Prevention Lifeline at 1-382.795.8823. This is open 24 hours a day.  Summary  · Diabetes (type 1 diabetes mellitus or type 2 diabetes mellitus) is a condition in which the body does not have enough of a hormone called insulin, or the body does not respond properly to insulin.  · Living with diabetes puts you at risk for medical issues, and it also puts you at risk for emotional issues such as emotional distress, depression, and anxiety.  · Recognizing the symptoms of emotional distress and depression may help you avoid problems with your diabetes control. It is important to start treatment for emotional distress and depression soon after they are diagnosed.  · Having diabetes means that you are at a higher risk for depression. Ask your health care provider to recommend a therapist who understands both depression and diabetes.  · If you experience symptoms of emotional distress or depression, it is important to discuss this with your health care provider, certified diabetes educator, or therapist.  This information is not intended to replace advice given to you by your health care provider. Make sure you discuss any questions you have with your health care provider.  Document Revised: 12/30/2019 Document Reviewed: 05/03/2018  Elsevier Patient Education © 2020 [employer] Inc[employer]

## 2021-03-04 NOTE — PROGRESS NOTES
History of Present Illness   Jocelyn Palomo is a 55 y.o. female who presents to the clinic pertaining to her Diabetes, HTN and Dyslipidemia. She does have GERD which is worsening and she is requesting a referral to Gastroenterology. She did have a recent [facility] ED visit for a UTI and was started on Macrobid. This visit has been requested and reviewed including note, labs and xray reports.     Diabetes   She has type 2 diabetes mellitus. The initial diagnosis of diabetes was made years ago.  Associated symptoms include fatigue, peripheral neuropathy, and intermittent visual  Issues. Diabetic complications include peripheral neuropathy and partial amputation of left foot. Risk factors for coronary artery disease include diabetes mellitus, dyslipidemia, family history and tobacco exposure. Current diabetic treatment includes insulin injections. She is currently taking insulin pre-breakfast, pre-lunch, pre-dinner and at bedtime. Insulin injections are given by patient. Rotation sites for injection include the abdominal wall. She is following a generally unhealthy diet. She has not had a recent A1C.     Hypertension   The current episode started more than 1 year ago. Associated symptoms include headaches. Risk factors for coronary artery disease include diabetes mellitus, sedentary lifestyle, smoking/tobacco exposure and stress. Current antihypertension treatment includes ACE inhibitors and diuretics.     Dyslipidemia  The current episode started more than 1 year ago. The problem is uncontrolled. Pertinent negatives include no chest pain, myalgias or shortness of breath. Current antihyperlipidemic treatment includes statins. Compliance problems include adherence to diet.  Risk factors for coronary artery disease include diabetes mellitus, dyslipidemia, hypertension and post-menopausal.     GERD  She complains of dysphagia, heartburn and a hoarse voice. The problem has been gradually worsening. Risk factors include obesity,  "lack of exercise and smoking/tobacco exposure. She has tried a PPI for the symptoms.     Vitals:    03/04/21 1415   BP: 140/80   BP Location: Left arm   Patient Position: Sitting   Cuff Size: Adult   Pulse: 83   Resp: 14   Temp: 97.3 °F (36.3 °C)   TempSrc: Temporal   SpO2: 91%   Height: 165.1 cm (65\")      The following portions of the patient's history were reviewed and updated as appropriate: allergies, current medications, past family history, past medical history, past social history, past surgical history and problem list.    Review of Systems   Constitutional: Positive for activity change, appetite change and fatigue. Negative for chills, diaphoresis and fever.   HENT: Positive for hoarse voice.    Eyes: Positive for blurred vision and visual disturbance.   Respiratory: Positive for cough and shortness of breath. Negative for wheezing.         Chronic and stable   Gastrointestinal: Positive for dysphagia.   Endocrine: Positive for polydipsia and polyuria.   Musculoskeletal: Positive for arthralgias and gait problem.   Psychiatric/Behavioral: Positive for sleep disturbance and stress. Negative for suicidal ideas. The patient is nervous/anxious.       Physical Exam  Vitals reviewed.   Constitutional:       General: She is not in acute distress.     Appearance: She is well-developed.   HENT:      Head: Normocephalic.      Nose: Nose normal.   Eyes:      General: No scleral icterus.        Right eye: No discharge.         Left eye: No discharge.      Conjunctiva/sclera: Conjunctivae normal.   Neck:      Vascular: No JVD.   Cardiovascular:      Rate and Rhythm: Normal rate and regular rhythm.      Heart sounds: Normal heart sounds. No murmur. No friction rub.   Pulmonary:      Effort: Pulmonary effort is normal. No respiratory distress.      Breath sounds: Decreased breath sounds and rhonchi present. No wheezing or rales.   Abdominal:      General: Bowel sounds are normal. There is no distension.      Palpations: " Abdomen is soft.      Tenderness: There is abdominal tenderness in the epigastric area.   Musculoskeletal:         General: No tenderness.      Cervical back: Neck supple.   Lymphadenopathy:      Cervical: No cervical adenopathy.   Skin:     General: Skin is warm and dry.      Capillary Refill: Capillary refill takes less than 2 seconds.   Neurological:      Mental Status: She is alert and oriented to person, place, and time.   Psychiatric:         Mood and Affect: Mood is anxious.         Speech: Speech normal.         Behavior: Behavior is cooperative.         Thought Content: Thought content normal.         Cognition and Memory: Cognition normal.       Assessment/Plan   Problems Addressed this Visit        Cardiac and Vasculature    Mixed hyperlipidemia    Relevant Medications    rosuvastatin (CRESTOR) 40 MG tablet    icosapent ethyl (Vascepa) 1 g capsule capsule    Other Relevant Orders    Lipid Panel (Completed)    Essential hypertension    Relevant Medications    potassium chloride (K-DUR,KLOR-CON) 10 MEQ CR tablet    lisinopril-hydrochlorothiazide (PRINZIDE,ZESTORETIC) 20-25 MG per tablet    Other Relevant Orders    Comprehensive Metabolic Panel (Completed)       Endocrine and Metabolic    Type 2 diabetes mellitus with peripheral neuropathy (CMS/HCC) - Primary    Relevant Medications    insulin aspart (novoLOG FLEXPEN) 100 UNIT/ML solution pen-injector sc pen    glucose blood test strip    Other Relevant Orders    Comprehensive Metabolic Panel (Completed)    TSH (Completed)    Hemoglobin A1c (Completed)    Vitamin B12 (Completed)       Gastrointestinal Abdominal     Gastroesophageal reflux disease without esophagitis    Relevant Medications    pantoprazole (PROTONIX) 40 MG EC tablet    Other Relevant Orders    CBC & Differential (Completed)    Comprehensive Metabolic Panel (Completed)    Magnesium (Completed)    Vitamin B12 (Completed)    Ambulatory Referral to Gastroenterology (Completed)       Genitourinary  and Reproductive     Renal insufficiency    Relevant Orders    Comprehensive Metabolic Panel (Completed)    Magnesium (Completed)      Other Visit Diagnoses     Vitamin D deficiency        Vit D level ordered    Relevant Orders    Vitamin D 25 Hydroxy (Completed)    Candidiasis        Diflucan prescribed    Relevant Medications    fluconazole (DIFLUCAN) 150 MG tablet      Diagnoses       Codes Comments    Type 2 diabetes mellitus with peripheral neuropathy (CMS/HCC)    -  Primary ICD-10-CM: E11.42  ICD-9-CM: 250.60, 357.2 Labs ordered today. Reinforced the importance of glycemic control    Mixed hyperlipidemia     ICD-10-CM: E78.2  ICD-9-CM: 272.2 Lipid panel ordered. Cardiovascular risk reduction modifications reinforced.     Essential hypertension     ICD-10-CM: I10  ICD-9-CM: 401.9 Adequately controlled    Gastroesophageal reflux disease without esophagitis     ICD-10-CM: K21.9  ICD-9-CM: 530.81 Referral to Dr Cade    Renal insufficiency     ICD-10-CM: N28.9  ICD-9-CM: 593.9 Labs ordered    Vitamin D deficiency     ICD-10-CM: E55.9  ICD-9-CM: 268.9 Vit D level ordered    Candidiasis     ICD-10-CM: B37.9  ICD-9-CM: 112.9 Diflucan prescribed      Findings and recommendations discussed with Jocelyn. Labs ordered and follow up appointment made to review results. Cardiovascular risk reduction modifications reinforced including nutrition and smoking cessation recommendations. Requested she follow up in one week sooner if problems/concerns occur.    This document has been electronically signed by BARBI Lombardo, TRINY-BC, ANGELE  March 4, 2021 16:42 EST

## 2021-03-05 ENCOUNTER — PRIOR AUTHORIZATION (OUTPATIENT)
Dept: FAMILY MEDICINE CLINIC | Facility: CLINIC | Age: 56
End: 2021-03-05

## 2021-03-05 LAB
25(OH)D3+25(OH)D2 SERPL-MCNC: 13.1 NG/ML
ALBUMIN SERPL-MCNC: 3.6 G/DL (ref 3.5–5.2)
ALBUMIN/GLOB SERPL: 1 G/DL
ALP SERPL-CCNC: 206 U/L (ref 39–117)
ALT SERPL-CCNC: 7 U/L (ref 1–33)
AST SERPL-CCNC: 9 U/L (ref 1–32)
BASOPHILS # BLD AUTO: 0.12 10*3/MM3 (ref 0–0.2)
BASOPHILS NFR BLD AUTO: 0.8 % (ref 0–1.5)
BILIRUB SERPL-MCNC: 0.2 MG/DL (ref 0–1.2)
BUN SERPL-MCNC: 13 MG/DL (ref 6–20)
BUN/CREAT SERPL: 10.1 (ref 7–25)
CALCIUM SERPL-MCNC: 8.9 MG/DL (ref 8.6–10.5)
CHLORIDE SERPL-SCNC: 94 MMOL/L (ref 98–107)
CHOLEST SERPL-MCNC: 261 MG/DL (ref 0–200)
CO2 SERPL-SCNC: 28.6 MMOL/L (ref 22–29)
CREAT SERPL-MCNC: 1.29 MG/DL (ref 0.57–1)
EOSINOPHIL # BLD AUTO: 0.43 10*3/MM3 (ref 0–0.4)
EOSINOPHIL NFR BLD AUTO: 2.8 % (ref 0.3–6.2)
ERYTHROCYTE [DISTWIDTH] IN BLOOD BY AUTOMATED COUNT: 13.1 % (ref 12.3–15.4)
GLOBULIN SER CALC-MCNC: 3.6 GM/DL
GLUCOSE SERPL-MCNC: 347 MG/DL (ref 65–99)
HBA1C MFR BLD: 15.5 % (ref 4.8–5.6)
HCT VFR BLD AUTO: 44.9 % (ref 34–46.6)
HDLC SERPL-MCNC: 33 MG/DL (ref 40–60)
HGB BLD-MCNC: 14.7 G/DL (ref 12–15.9)
IMM GRANULOCYTES # BLD AUTO: 0.1 10*3/MM3 (ref 0–0.05)
IMM GRANULOCYTES NFR BLD AUTO: 0.6 % (ref 0–0.5)
LDLC SERPL CALC-MCNC: 120 MG/DL (ref 0–100)
LYMPHOCYTES # BLD AUTO: 2.97 10*3/MM3 (ref 0.7–3.1)
LYMPHOCYTES NFR BLD AUTO: 19 % (ref 19.6–45.3)
MAGNESIUM SERPL-MCNC: 1.7 MG/DL (ref 1.6–2.6)
MCH RBC QN AUTO: 29.9 PG (ref 26.6–33)
MCHC RBC AUTO-ENTMCNC: 32.7 G/DL (ref 31.5–35.7)
MCV RBC AUTO: 91.3 FL (ref 79–97)
MONOCYTES # BLD AUTO: 0.92 10*3/MM3 (ref 0.1–0.9)
MONOCYTES NFR BLD AUTO: 5.9 % (ref 5–12)
NEUTROPHILS # BLD AUTO: 11.09 10*3/MM3 (ref 1.7–7)
NEUTROPHILS NFR BLD AUTO: 70.9 % (ref 42.7–76)
NRBC BLD AUTO-RTO: 0 /100 WBC (ref 0–0.2)
PLATELET # BLD AUTO: 287 10*3/MM3 (ref 140–450)
POTASSIUM SERPL-SCNC: 3.9 MMOL/L (ref 3.5–5.2)
PROT SERPL-MCNC: 7.2 G/DL (ref 6–8.5)
RBC # BLD AUTO: 4.92 10*6/MM3 (ref 3.77–5.28)
SODIUM SERPL-SCNC: 134 MMOL/L (ref 136–145)
TRIGL SERPL-MCNC: 603 MG/DL (ref 0–150)
TSH SERPL DL<=0.005 MIU/L-ACNC: 2.84 UIU/ML (ref 0.27–4.2)
VIT B12 SERPL-MCNC: 540 PG/ML (ref 211–946)
VLDLC SERPL CALC-MCNC: 108 MG/DL (ref 5–40)
WBC # BLD AUTO: 15.63 10*3/MM3 (ref 3.4–10.8)

## 2021-03-10 ENCOUNTER — OFFICE VISIT (OUTPATIENT)
Dept: FAMILY MEDICINE CLINIC | Facility: CLINIC | Age: 56
End: 2021-03-10

## 2021-03-10 VITALS — BODY MASS INDEX: 29.49 KG/M2 | HEIGHT: 65 IN | WEIGHT: 177 LBS

## 2021-03-10 DIAGNOSIS — K21.9 GASTROESOPHAGEAL REFLUX DISEASE WITHOUT ESOPHAGITIS: Chronic | ICD-10-CM

## 2021-03-10 DIAGNOSIS — E78.2 MIXED HYPERLIPIDEMIA: Chronic | ICD-10-CM

## 2021-03-10 DIAGNOSIS — E55.9 VITAMIN D DEFICIENCY: ICD-10-CM

## 2021-03-10 DIAGNOSIS — I10 ESSENTIAL HYPERTENSION: Chronic | ICD-10-CM

## 2021-03-10 DIAGNOSIS — E11.42 TYPE 2 DIABETES MELLITUS WITH PERIPHERAL NEUROPATHY (HCC): Primary | Chronic | ICD-10-CM

## 2021-03-10 PROCEDURE — 99442 PR PHYS/QHP TELEPHONE EVALUATION 11-20 MIN: CPT | Performed by: NURSE PRACTITIONER

## 2021-03-10 RX ORDER — PROCHLORPERAZINE 25 MG/1
1 SUPPOSITORY RECTAL DAILY
Qty: 1 EACH | Refills: 0 | Status: SHIPPED | OUTPATIENT
Start: 2021-03-10 | End: 2022-01-13

## 2021-03-10 RX ORDER — ERGOCALCIFEROL 1.25 MG/1
50000 CAPSULE ORAL
Qty: 5 CAPSULE | Refills: 3 | Status: SHIPPED | OUTPATIENT
Start: 2021-03-10 | End: 2022-01-13 | Stop reason: SDUPTHER

## 2021-03-10 RX ORDER — PROCHLORPERAZINE 25 MG/1
SUPPOSITORY RECTAL
Qty: 3 EACH | Refills: 3 | Status: SHIPPED | OUTPATIENT
Start: 2021-03-10 | End: 2022-01-13

## 2021-03-10 NOTE — PROGRESS NOTES
You have chosen to receive care through a telephone visit. Do you consent to use a telephone visit for your medical care today? Yes    History of Present Illness   Jocelyn Palomo is a 55 y.o. female who presents to the clinic pertaining to her Diabetes, HTN and Dyslipidemia. She does have GERD which is worsening and she is requesting a referral to Gastroenterology. She did have a recent [facility] ED visit for a UTI and was started on Macrobid.     Diabetes   She has type 2 diabetes mellitus. The initial diagnosis of diabetes was made years ago.  Associated symptoms include fatigue, peripheral neuropathy, and intermittent visual  Issues. Diabetic complications include peripheral neuropathy and partial amputation of left foot. Risk factors for coronary artery disease include diabetes mellitus, dyslipidemia, family history and tobacco exposure. Current diabetic treatment includes insulin injections. She is currently taking insulin pre-breakfast, pre-lunch, pre-dinner and at bedtime(basal). Insulin injections are given by patient. Rotation sites for injection include the abdominal wall. She is following a generally unhealthy diet.     Lab Results   Component Value Date    HGBA1C 15.50 (H) 03/04/2021     Hypertension   The current episode started more than 1 year ago. Associated symptoms include headaches. Risk factors for coronary artery disease include diabetes mellitus, sedentary lifestyle, smoking/tobacco exposure and stress. Current antihypertension treatment includes ACE inhibitors and diuretics.   Lab Results   Component Value Date    BUN 13 03/04/2021    CREATININE 1.29 (H) 03/04/2021    EGFRIFNONA 43 (L) 03/04/2021    EGFRIFAFRI 52 (L) 03/04/2021    BCR 10.1 03/04/2021    K 3.9 03/04/2021    CO2 28.6 03/04/2021    CALCIUM 8.9 03/04/2021    PROTENTOTREF 7.2 03/04/2021    ALBUMIN 3.60 03/04/2021    LABIL2 1.0 03/04/2021    AST 9 03/04/2021    ALT 7 03/04/2021     Dyslipidemia  The current episode started more than 1 year  "ago. The problem is uncontrolled. Pertinent negatives include no chest pain, myalgias or shortness of breath. Current antihyperlipidemic treatment includes statins. Compliance problems include adherence to diet.  Risk factors for coronary artery disease include diabetes mellitus, dyslipidemia, hypertension and post-menopausal.   Lab Results   Component Value Date    CHLPL 261 (H) 03/04/2021    TRIG 603 (H) 03/04/2021    HDL 33 (L) 03/04/2021     (H) 03/04/2021     GERD  She complains of dysphagia, heartburn and a hoarse voice. The problem has been gradually worsening. Risk factors include obesity, lack of exercise and smoking/tobacco exposure. She has tried a PPI for the symptoms.  Lab Results   Component Value Date    WBC 15.63 (H) 03/04/2021    HGB 14.7 03/04/2021    HCT 44.9 03/04/2021    MCV 91.3 03/04/2021     03/04/2021      Vitals:    03/10/21 1530   Weight: 80.3 kg (177 lb)   Height: 165.1 cm (65\")        The following portions of the patient's history were reviewed and updated as appropriate: allergies, current medications, past family history, past medical history, past social history, past surgical history and problem list.    Review of Systems   Constitutional: Positive for activity change, appetite change and fatigue. Negative for chills, diaphoresis and fever.   Eyes: Positive for blurred vision and visual disturbance.   Respiratory: Positive for cough and shortness of breath. Negative for wheezing.         Chronic and stable   Endocrine: Positive for polydipsia and polyuria.   Musculoskeletal: Positive for arthralgias and gait problem.   Psychiatric/Behavioral: Positive for sleep disturbance and stress. Negative for suicidal ideas. The patient is nervous/anxious.       Physical Exam  Constitutional:       Comments: Very hoarse weak voice.    Pulmonary:      Effort: No respiratory distress.   Psychiatric:         Mood and Affect: Affect is flat.         Behavior: Behavior is cooperative. "         Results for orders placed or performed in visit on 03/04/21   Comprehensive Metabolic Panel    Specimen: Blood    BLOOD  MANUAL DIFFEREN   Result Value Ref Range    Glucose 347 (H) 65 - 99 mg/dL    BUN 13 6 - 20 mg/dL    Creatinine 1.29 (H) 0.57 - 1.00 mg/dL    eGFR Non African Am 43 (L) >60 mL/min/1.73    eGFR African Am 52 (L) >60 mL/min/1.73    BUN/Creatinine Ratio 10.1 7.0 - 25.0    Sodium 134 (L) 136 - 145 mmol/L    Potassium 3.9 3.5 - 5.2 mmol/L    Chloride 94 (L) 98 - 107 mmol/L    Total CO2 28.6 22.0 - 29.0 mmol/L    Calcium 8.9 8.6 - 10.5 mg/dL    Total Protein 7.2 6.0 - 8.5 g/dL    Albumin 3.60 3.50 - 5.20 g/dL    Globulin 3.6 gm/dL    A/G Ratio 1.0 g/dL    Total Bilirubin 0.2 0.0 - 1.2 mg/dL    Alkaline Phosphatase 206 (H) 39 - 117 U/L    AST (SGOT) 9 1 - 32 U/L    ALT (SGPT) 7 1 - 33 U/L   Lipid Panel    Specimen: Blood    BLOOD  MANUAL DIFFEREN   Result Value Ref Range    Total Cholesterol 261 (H) 0 - 200 mg/dL    Triglycerides 603 (H) 0 - 150 mg/dL    HDL Cholesterol 33 (L) 40 - 60 mg/dL    VLDL Cholesterol Santiago 108 (H) 5 - 40 mg/dL    LDL Chol Calc (NIH) 120 (H) 0 - 100 mg/dL   TSH    Specimen: Blood    BLOOD  MANUAL DIFFEREN   Result Value Ref Range    TSH 2.840 0.270 - 4.200 uIU/mL   Hemoglobin A1c    Specimen: Blood    BLOOD  MANUAL DIFFEREN   Result Value Ref Range    Hemoglobin A1C 15.50 (H) 4.80 - 5.60 %   Vitamin D 25 Hydroxy    Specimen: Blood    BLOOD  MANUAL DIFFEREN   Result Value Ref Range    25 Hydroxy, Vitamin D 13.1 ng/ml   Magnesium    Specimen: Blood    BLOOD  MANUAL DIFFEREN   Result Value Ref Range    Magnesium 1.7 1.6 - 2.6 mg/dL   Vitamin B12    Specimen: Blood    BLOOD  MANUAL DIFFEREN   Result Value Ref Range    Vitamin B-12 540 211 - 946 pg/mL   CBC & Differential    Specimen: Blood    BLOOD  MANUAL DIFFEREN   Result Value Ref Range    WBC 15.63 (H) 3.40 - 10.80 10*3/mm3    RBC 4.92 3.77 - 5.28 10*6/mm3    Hemoglobin 14.7 12.0 - 15.9 g/dL    Hematocrit 44.9 34.0 - 46.6 %     MCV 91.3 79.0 - 97.0 fL    MCH 29.9 26.6 - 33.0 pg    MCHC 32.7 31.5 - 35.7 g/dL    RDW 13.1 12.3 - 15.4 %    Platelets 287 140 - 450 10*3/mm3    Neutrophil Rel % 70.9 42.7 - 76.0 %    Lymphocyte Rel % 19.0 (L) 19.6 - 45.3 %    Monocyte Rel % 5.9 5.0 - 12.0 %    Eosinophil Rel % 2.8 0.3 - 6.2 %    Basophil Rel % 0.8 0.0 - 1.5 %    Neutrophils Absolute 11.09 (H) 1.70 - 7.00 10*3/mm3    Lymphocytes Absolute 2.97 0.70 - 3.10 10*3/mm3    Monocytes Absolute 0.92 (H) 0.10 - 0.90 10*3/mm3    Eosinophils Absolute 0.43 (H) 0.00 - 0.40 10*3/mm3    Basophils Absolute 0.12 0.00 - 0.20 10*3/mm3    Immature Granulocyte Rel % 0.6 (H) 0.0 - 0.5 %    Immature Grans Absolute 0.10 (H) 0.00 - 0.05 10*3/mm3    nRBC 0.0 0.0 - 0.2 /100 WBC       Assessment/Plan     Problems Addressed this Visit        Cardiac and Vasculature    Mixed hyperlipidemia    Essential hypertension       Endocrine and Metabolic    Type 2 diabetes mellitus with peripheral neuropathy (CMS/HCC) - Primary    Relevant Medications    insulin aspart (novoLOG FLEXPEN) 100 UNIT/ML solution pen-injector sc pen    Continuous Blood Gluc Sensor (Dexcom G6 Sensor)    Continuous Blood Gluc  (Dexcom G6 ) device    Continuous Blood Gluc Transmit (Dexcom G6 Transmitter) misc    insulin detemir (LEVEMIR) 100 UNIT/ML injection       Gastrointestinal Abdominal     Gastroesophageal reflux disease without esophagitis      Other Visit Diagnoses     Vitamin D deficiency        Continue Vit D weekly     Relevant Medications    vitamin D (ERGOCALCIFEROL) 1.25 MG (73643 UT) capsule capsule      Diagnoses       Codes Comments    Type 2 diabetes mellitus with peripheral neuropathy (CMS/HCC)    -  Primary ICD-10-CM: E11.42  ICD-9-CM: 250.60, 357.2 Reviewed recent lab results which included an A1C of over 15. Treatment options reviewed.     Essential hypertension     ICD-10-CM: I10  ICD-9-CM: 401.9 Continue Zestoretic    Mixed hyperlipidemia     ICD-10-CM: E78.2  ICD-9-CM:  272.2 Lipid panel reviewed with her. She does share she is taking Crestor and Vascepa daily. Emphasized importance of cardiovascular risk reduction    Gastroesophageal reflux disease without esophagitis     ICD-10-CM: K21.9  ICD-9-CM: 530.81 Referral to Dr Cade    Vitamin D deficiency     ICD-10-CM: E55.9  ICD-9-CM: 268.9 Continue Vit D weekly         Reviewed above labs with Jocelyn and treatment options. She is interested in pursuing a CMG which I have prescribed. Cardiovascular risk reduction modifications encouraged including smoking cessation. Has f/u with Dr Christianson later this month.   This visit has been scheduled as a phone visit to comply with patient safety concerns in accordance with CDC recommendations. Total time of discussion was 20  minutes.        This document has been electronically signed by BARBI Lombardo, TRINY-BC, ROJELIO  March 10, 2021 17:59 EST

## 2021-03-12 ENCOUNTER — TELEPHONE (OUTPATIENT)
Dept: FAMILY MEDICINE CLINIC | Facility: CLINIC | Age: 56
End: 2021-03-12

## 2021-03-22 RX ORDER — PROMETHAZINE HYDROCHLORIDE 25 MG/1
25 TABLET ORAL EVERY 6 HOURS PRN
Qty: 30 TABLET | Refills: 0 | Status: SHIPPED | OUTPATIENT
Start: 2021-03-22 | End: 2021-04-19 | Stop reason: SDUPTHER

## 2021-03-22 NOTE — TELEPHONE ENCOUNTER
Caller: Dewey Jocelyn    Relationship: Self    Best call back number:[phone]    Medication needed:   Requested Prescriptions     Pending Prescriptions Disp Refills   • promethazine (PHENERGAN) 25 MG tablet 30 tablet 0     Sig: Take 1 tablet by mouth Every 6 (Six) Hours As Needed for Nausea or Vomiting.       When do you need the refill by: 03/22    What additional details did the patient provide when requesting the medication: PATIENT IS OUT OF THE MEDICATION     Does the patient have less than a 3 day supply:  [x] Yes  [] No    What is the patient's preferred pharmacy: [place], [place] - [address] [place] [address] 1 - [phone]  - [phone] FX

## 2021-03-25 ENCOUNTER — PATIENT OUTREACH (OUTPATIENT)
Dept: PHARMACY | Facility: HOSPITAL | Age: 56
End: 2021-03-25

## 2021-03-25 NOTE — OUTREACH NOTE
Medication Adherence Call    Patient was called today to discuss medication adherence with rosuvastatin, as the patient was identified as having care opportunities.     The patient did not answer. I will try again at a later date.    Nikki Oliveira PharmD  03/25/21    Medication Adherence Call    Patient was called again today to discuss medication adherence with rosuvastatin, as the patient was identified as having care opportunities.     The patient did not answer for the second time. I will try once more at a later date.    Nikki Oliveira PharmD  04/06/21

## 2021-04-19 RX ORDER — PROMETHAZINE HYDROCHLORIDE 25 MG/1
25 TABLET ORAL EVERY 6 HOURS PRN
Qty: 30 TABLET | Refills: 0 | Status: SHIPPED | OUTPATIENT
Start: 2021-04-19 | End: 2021-05-27 | Stop reason: SDUPTHER

## 2021-04-19 NOTE — TELEPHONE ENCOUNTER
Caller: Jocelyn Palomo    Relationship: Self    Best call back number: [phone]  Medication needed:   Requested Prescriptions     Pending Prescriptions Disp Refills   • promethazine (PHENERGAN) 25 MG tablet 30 tablet 0     Sig: Take 1 tablet by mouth Every 6 (Six) Hours As Needed for Nausea or Vomiting.       When do you need the refill by: ASAP    What additional details did the patient provide when requesting the medication: PATIENT STATED SHE IS COMPLETELY OUT    Does the patient have less than a 3 day supply:  [x] Yes  [] No    What is the patient's preferred pharmacy: [address] 1 - [phone]  - [phone] FX

## 2021-04-29 ENCOUNTER — PATIENT OUTREACH (OUTPATIENT)
Dept: PHARMACY | Facility: HOSPITAL | Age: 56
End: 2021-04-29

## 2021-04-29 NOTE — OUTREACH NOTE
"Medication Adherence Call    Patient was called today to discuss medication adherence with rosuvastatin, as the patient was identified as having care opportunities.     The patient stated that she is \"probably out of that medication.\" She denied cost, transportation, etc. as reasons for late refills. She assured me she would call in her refill when we hung up and have her son pick it up. I stressed the importance of adherence to these medications. I educated her on the option to receive 90 day supplies and she expressed interest.    Nikki Oliveira, PharmD  04/29/21      "

## 2021-05-07 ENCOUNTER — TELEPHONE (OUTPATIENT)
Dept: FAMILY MEDICINE CLINIC | Facility: CLINIC | Age: 56
End: 2021-05-07

## 2021-05-07 NOTE — TELEPHONE ENCOUNTER
Caller: Jocelyn Palomo    Relationship: Self    Best call back number: [phone]    What medication are you requesting: DIFLUCAN    What are your current symptoms: VAGINAL ITCHING    How long have you been experiencing symptoms: 2 WEEKS    Have you had these symptoms before:    [x] Yes  [] No    Have you been treated for these symptoms before:   [x] Yes  [] No    If a prescription is needed, what is your preferred pharmacy and phone number:      [place], [place] - [address] [place] [address] - [phone]  - [phone]        Additional notes:

## 2021-05-07 NOTE — TELEPHONE ENCOUNTER
Caller: Jocelyn Palomo    Relationship: Self    Best call back number: [phone]    What medication are you requesting: COUGH PEARLS    What are your current symptoms: COUGH, CONGESTION    How long have you been experiencing symptoms: 7 DAYS    Have you had these symptoms before:    [x] Yes  [] No    Have you been treated for these symptoms before:   [x] Yes  [] No    If a prescription is needed, what is your preferred pharmacy and phone number:      [place], [place] - [address] [place] [address] - [phone]  - [phone]        Additional notes:

## 2021-05-08 RX ORDER — FLUCONAZOLE 150 MG/1
150 TABLET ORAL DAILY PRN
Qty: 3 TABLET | Refills: 0 | Status: SHIPPED | OUTPATIENT
Start: 2021-05-08 | End: 2021-07-20

## 2021-05-08 RX ORDER — BENZONATATE 200 MG/1
200 CAPSULE ORAL 3 TIMES DAILY PRN
Qty: 30 CAPSULE | Refills: 0 | Status: SHIPPED | OUTPATIENT
Start: 2021-05-08 | End: 2022-01-26

## 2021-05-27 RX ORDER — PROMETHAZINE HYDROCHLORIDE 25 MG/1
25 TABLET ORAL EVERY 6 HOURS PRN
Qty: 30 TABLET | Refills: 0 | Status: SHIPPED | OUTPATIENT
Start: 2021-05-27 | End: 2021-07-07

## 2021-05-27 NOTE — TELEPHONE ENCOUNTER
Caller: Dewey Jocelyn    Relationship: Self    Best call back number: [phone]    Medication needed:   Requested Prescriptions     Pending Prescriptions Disp Refills   • promethazine (PHENERGAN) 25 MG tablet 30 tablet 0     Sig: Take 1 tablet by mouth Every 6 (Six) Hours As Needed for Nausea or Vomiting.       When do you need the refill by: AS SOON AS POSSIBLE    What additional details did the patient provide when requesting the medication: PATIENT IS OUT OF THE MEDICATION    Does the patient have less than a 3 day supply:  [x] Yes  [] No    What is the patient's preferred pharmacy: [place], [place] - [address] [place] [address] 1 - [phone]  - [phone] FX

## 2021-05-28 ENCOUNTER — PATIENT OUTREACH (OUTPATIENT)
Dept: PHARMACY | Facility: HOSPITAL | Age: 56
End: 2021-05-28

## 2021-05-28 NOTE — OUTREACH NOTE
Medication Adherence Call    Patient was called today to discuss medication adherence with lisinopril/HCTZ, as the patient was identified as having care opportunities. I spoke with this patient about a month ago regarding her adherence to rosuvastatin.     The patient did not answer. I will try again later.    Nikki Oliveira PharmD  Population [facility] Pharmacist  05/28/21    Medication Adherence Call    Patient was called again today to discuss medication adherence with lisinopril/HCTZ, as the patient was identified as having care opportunities.     The patient did not answer for the second time.    Nikki Oliveira PharmD  [facility] Pharmacist  06/01/21

## 2021-06-10 ENCOUNTER — TELEPHONE (OUTPATIENT)
Dept: FAMILY MEDICINE CLINIC | Facility: CLINIC | Age: 56
End: 2021-06-10

## 2021-06-10 DIAGNOSIS — G89.4 CHRONIC PAIN SYNDROME: Primary | ICD-10-CM

## 2021-06-10 NOTE — TELEPHONE ENCOUNTER
Caller: Jocelyn Palomo    Relationship: Self    Best call back number: [phone]    What is the medical concern/diagnosis: PAIN MANAGEMENT      What specialty or service is being requested: [facility]    What is the provider, practice or medical service name: DR. RADHA LAO    What is the office location: [address] [place] [address]    What is the office phone number: [phone]

## 2021-07-07 DIAGNOSIS — K21.9 GASTROESOPHAGEAL REFLUX DISEASE WITHOUT ESOPHAGITIS: Chronic | ICD-10-CM

## 2021-07-07 RX ORDER — PANTOPRAZOLE SODIUM 40 MG/1
TABLET, DELAYED RELEASE ORAL
Qty: 30 TABLET | Refills: 3 | Status: SHIPPED | OUTPATIENT
Start: 2021-07-07 | End: 2021-11-09 | Stop reason: SDUPTHER

## 2021-07-07 RX ORDER — PROMETHAZINE HYDROCHLORIDE 25 MG/1
TABLET ORAL
Qty: 30 TABLET | Refills: 0 | Status: SHIPPED | OUTPATIENT
Start: 2021-07-07 | End: 2021-08-26 | Stop reason: SDUPTHER

## 2021-07-20 RX ORDER — FLUCONAZOLE 150 MG/1
TABLET ORAL
Qty: 3 TABLET | Refills: 0 | Status: SHIPPED | OUTPATIENT
Start: 2021-07-20 | End: 2022-01-13

## 2021-08-16 ENCOUNTER — PATIENT OUTREACH (OUTPATIENT)
Dept: CASE MANAGEMENT | Facility: OTHER | Age: 56
End: 2021-08-16

## 2021-08-16 NOTE — OUTREACH NOTE
Ambulatory Case Management Note    Patient Outreach    Patient's most recent a1c was 15.5 on 3/4/21. Patient reports diet as a barrier to achieving better blood sugar management. Patient reports a history of amputation of toes of L foot and numbness in both feet. Pt reports having a wound to top of L foot, states Dr. Valdovinos in [place] gave her a cream to apply and she is to follow up with him next week. Patient also reports that she's had an esophageal dilatation in the past and will need another one soon. Patient loves bread, states this is hard to avoid. ACM described role with HRCM, pt agreeable to calls.    Care Plan: Diabetes   Updates made since 8/16/2021 12:00 AM      Problem: DIET MANAGEMENT       Goal: Learn to Manage Calories       Task: Provide resources for diabetic diet    Responsible User: Ivelisse Quinn RN      Task: Provide resources on counting calories    Responsible User: Ivelisse Quinn RN      Problem: HBA1C UNCONTROLLED       Goal: Establish Regular Follow-Ups with PCP Completed 8/16/2021      Task: Refer patient to PCP Completed 8/16/2021   Responsible User: Ivelisse Quinn RN      Task: Determine patient's next PCP visit Completed 8/16/2021   Responsible User: Ivelisse Quinn RN      Task: Discuss schedule for PCP visits with patient Completed 8/16/2021   Responsible User: Ivelisse Quinn RN      Goal: Reduce HbA1c levels below 9%       Task: Educate patient on diet and exercise Completed 8/16/2021   Responsible User: Ivelisse Quinn RN      Task: Educate patient on regular BS checks Completed 8/16/2021   Responsible User: Ivelisse Quinn RN      Task: Discuss diabetes treatment plan with patient    Responsible User: Ivelisse Quinn RN      Problem: MEDICATION ADHERENCE Resolved 8/16/2021      Goal: Consistently take medications as prescribed Completed 8/16/2021      Task: Discuss barriers to medication adherence with patient Completed 8/16/2021   Responsible User: Ivelisse Quinn RN       Task: Educate patient on frequency and refill details of meds Completed 8/16/2021   Responsible User: Ivelisse Quinn RN      Task: Assist patients in setting up daily notes/alarms for meds. Consider pill box use Completed 8/16/2021   Responsible User: Ivelisse Quinn RN      Task: Refer to value-based pharmacist Completed 8/16/2021   Responsible User: Ivelisse Quinn RN      Problem: PATIENT IS INACTIVE       Goal: Exercise at least 20 minutes per day       Task: Discuss barriers to activity with patient. Update SDOH.    Responsible User: Ivelisse Quinn RN      Task: Develop exercise plan with patient    Responsible User: Ivelisse Quinn RN      Task: Explore community resources including walking groups, assistance programs, and home videos.    Responsible User: Ivelisse Quinn RN      Problem: KNOWLEDGE DEFICIT       Goal: Patient able to teach back disease management       Task: Refer to value-based pharmacist    Responsible User: Ivelisse Quinn RN      Task: Refer to [facility] Certified Diabetic Educators    Responsible User: Ivelisse Quinn RN      Task: Provide diabetic educational resources    Responsible User: Ivelisse Quinn RN      Task: Educate on hyper/hypo-glycemia signs and symptoms    Responsible User: Ivelisse Quinn RN      Task: Educate/provide resource for ADA blood glucose and HbA1c targets    Responsible User: Ivelisse Quinn RN      Task: Educate on health prevention for diabetes: eye/foot exam, nephropathy screening, and potential statin use    Responsible User: Ivelisse Quinn RN Debra Beverly, RN  Ambulatory Case Management    8/16/2021, 11:25 EDT

## 2021-08-26 RX ORDER — PROMETHAZINE HYDROCHLORIDE 25 MG/1
TABLET ORAL
Qty: 30 TABLET | Refills: 0 | Status: SHIPPED | OUTPATIENT
Start: 2021-08-26 | End: 2021-09-27 | Stop reason: SDUPTHER

## 2021-09-27 RX ORDER — PROMETHAZINE HYDROCHLORIDE 25 MG/1
25 TABLET ORAL EVERY 6 HOURS PRN
Qty: 30 TABLET | Refills: 0 | Status: SHIPPED | OUTPATIENT
Start: 2021-09-27 | End: 2021-10-19

## 2021-10-19 RX ORDER — PROMETHAZINE HYDROCHLORIDE 25 MG/1
TABLET ORAL
Qty: 30 TABLET | Refills: 0 | Status: SHIPPED | OUTPATIENT
Start: 2021-10-19 | End: 2021-11-02

## 2021-11-02 ENCOUNTER — TELEPHONE (OUTPATIENT)
Dept: FAMILY MEDICINE CLINIC | Facility: CLINIC | Age: 56
End: 2021-11-02

## 2021-11-02 DIAGNOSIS — E11.42 TYPE 2 DIABETES MELLITUS WITH PERIPHERAL NEUROPATHY (HCC): Chronic | ICD-10-CM

## 2021-11-02 RX ORDER — PROMETHAZINE HYDROCHLORIDE 25 MG/1
TABLET ORAL
Qty: 30 TABLET | Refills: 0 | Status: SHIPPED | OUTPATIENT
Start: 2021-11-02 | End: 2021-11-09 | Stop reason: SDUPTHER

## 2021-11-02 RX ORDER — INSULIN DETEMIR 100 [IU]/ML
INJECTION, SOLUTION SUBCUTANEOUS
Qty: 15 PEN | Refills: 0 | Status: SHIPPED | OUTPATIENT
Start: 2021-11-02 | End: 2022-01-13

## 2021-11-02 NOTE — TELEPHONE ENCOUNTER
Provider: SAUL CAROLINA   Caller: DEYANIRA NOVOA   Relationship to Patient: SELF  Pharmacy: [facility]  Phone Number: [phone]  Reason for Call: PATIENT STATED THAT SHE ACCIDENTALLY SPILLED HER PHENERGAN PILLS DOWN THE SINK YESTERDAY. SHE WOULD LIKE TO SEE IF SHE CAN GET ANOTHER PRESCRIPTION

## 2021-11-09 DIAGNOSIS — K21.9 GASTROESOPHAGEAL REFLUX DISEASE WITHOUT ESOPHAGITIS: Chronic | ICD-10-CM

## 2021-11-09 RX ORDER — PROMETHAZINE HYDROCHLORIDE 25 MG/1
25 TABLET ORAL EVERY 6 HOURS PRN
Qty: 30 TABLET | Refills: 0 | Status: SHIPPED | OUTPATIENT
Start: 2021-11-09 | End: 2022-01-13

## 2021-11-09 RX ORDER — PROMETHAZINE HYDROCHLORIDE 25 MG/1
TABLET ORAL
Qty: 30 TABLET | Refills: 1 | Status: SHIPPED | OUTPATIENT
Start: 2021-11-09 | End: 2021-11-09

## 2021-11-09 RX ORDER — PANTOPRAZOLE SODIUM 40 MG/1
40 TABLET, DELAYED RELEASE ORAL DAILY
Qty: 30 TABLET | Refills: 3 | Status: SHIPPED | OUTPATIENT
Start: 2021-11-09 | End: 2022-01-13 | Stop reason: SDUPTHER

## 2021-11-09 RX ORDER — PANTOPRAZOLE SODIUM 40 MG/1
TABLET, DELAYED RELEASE ORAL
Qty: 30 TABLET | Refills: 1 | Status: SHIPPED | OUTPATIENT
Start: 2021-11-09 | End: 2021-11-09

## 2021-11-09 NOTE — TELEPHONE ENCOUNTER
Caller: Jocelyn Palomo    Relationship: Self    Best call back number: [phone]    Requested Prescriptions:   Requested Prescriptions     Pending Prescriptions Disp Refills   • pantoprazole (PROTONIX) 40 MG EC tablet 30 tablet 3     Sig: Take 1 tablet by mouth Daily.   • promethazine (PHENERGAN) 25 MG tablet 30 tablet 0        Pharmacy where request should be sent: [address] - [phone]  - [phone]     Additional details provided by patient:     Does the patient have less than a 3 day supply:  [x] Yes  [] No    Hollis Love Rep   11/09/21 08:56 EST

## 2021-11-18 ENCOUNTER — TELEPHONE (OUTPATIENT)
Dept: FAMILY MEDICINE CLINIC | Facility: CLINIC | Age: 56
End: 2021-11-18

## 2021-11-18 NOTE — TELEPHONE ENCOUNTER
Caller: Jocelyn Palomo    Relationship: Self    Best call back number: [phone]     What is the best time to reach you: ANYTIME     Who are you requesting to speak with (clinical staff, provider,  specific staff member): CLINICAL STAFF     What was the call regarding: PATIENT IS CALLING TO LET THE OFFICE KNOW THAT SHE IS CURRENTLY ADMITTED INTO [facility]. SHE SAYS THAT HER LEFT LEG WAS AMPUTATED ABOVE THE KNEE.     Do you require a callback: NO

## 2021-11-26 ENCOUNTER — LAB REQUISITION (OUTPATIENT)
Dept: LAB | Facility: HOSPITAL | Age: 56
End: 2021-11-26

## 2021-11-26 DIAGNOSIS — I50.30 UNSPECIFIED DIASTOLIC (CONGESTIVE) HEART FAILURE (HCC): ICD-10-CM

## 2021-11-26 LAB
ALBUMIN SERPL-MCNC: 2.56 G/DL (ref 3.5–5.2)
ALBUMIN/GLOB SERPL: 0.6 G/DL
ALP SERPL-CCNC: 255 U/L (ref 39–117)
ALT SERPL W P-5'-P-CCNC: 7 U/L (ref 1–33)
ANION GAP SERPL CALCULATED.3IONS-SCNC: 7.2 MMOL/L (ref 5–15)
AST SERPL-CCNC: 11 U/L (ref 1–32)
BASOPHILS # BLD AUTO: 0.12 10*3/MM3 (ref 0–0.2)
BASOPHILS NFR BLD AUTO: 0.8 % (ref 0–1.5)
BILIRUB SERPL-MCNC: 0.3 MG/DL (ref 0–1.2)
BUN SERPL-MCNC: 20 MG/DL (ref 6–20)
BUN/CREAT SERPL: 17.1 (ref 7–25)
CALCIUM SPEC-SCNC: 8.4 MG/DL (ref 8.6–10.5)
CHLORIDE SERPL-SCNC: 96 MMOL/L (ref 98–107)
CO2 SERPL-SCNC: 33.8 MMOL/L (ref 22–29)
CREAT SERPL-MCNC: 1.17 MG/DL (ref 0.57–1)
DEPRECATED RDW RBC AUTO: 54.4 FL (ref 37–54)
EOSINOPHIL # BLD AUTO: 0.2 10*3/MM3 (ref 0–0.4)
EOSINOPHIL NFR BLD AUTO: 1.3 % (ref 0.3–6.2)
ERYTHROCYTE [DISTWIDTH] IN BLOOD BY AUTOMATED COUNT: 17.2 % (ref 12.3–15.4)
GFR SERPL CREATININE-BSD FRML MDRD: 48 ML/MIN/1.73
GLOBULIN UR ELPH-MCNC: 4 GM/DL
GLUCOSE SERPL-MCNC: 284 MG/DL (ref 65–99)
HBA1C MFR BLD: 9.7 % (ref 4.8–5.6)
HCT VFR BLD AUTO: 28.9 % (ref 34–46.6)
HGB BLD-MCNC: 8.4 G/DL (ref 12–15.9)
IMM GRANULOCYTES # BLD AUTO: 0.07 10*3/MM3 (ref 0–0.05)
IMM GRANULOCYTES NFR BLD AUTO: 0.5 % (ref 0–0.5)
LYMPHOCYTES # BLD AUTO: 3.69 10*3/MM3 (ref 0.7–3.1)
LYMPHOCYTES NFR BLD AUTO: 24.2 % (ref 19.6–45.3)
MCH RBC QN AUTO: 25.4 PG (ref 26.6–33)
MCHC RBC AUTO-ENTMCNC: 29.1 G/DL (ref 31.5–35.7)
MCV RBC AUTO: 87.3 FL (ref 79–97)
MONOCYTES # BLD AUTO: 1.13 10*3/MM3 (ref 0.1–0.9)
MONOCYTES NFR BLD AUTO: 7.4 % (ref 5–12)
NEUTROPHILS NFR BLD AUTO: 10.01 10*3/MM3 (ref 1.7–7)
NEUTROPHILS NFR BLD AUTO: 65.8 % (ref 42.7–76)
NRBC BLD AUTO-RTO: 0 /100 WBC (ref 0–0.2)
PLATELET # BLD AUTO: 578 10*3/MM3 (ref 140–450)
PMV BLD AUTO: 11.2 FL (ref 6–12)
POTASSIUM SERPL-SCNC: 5.3 MMOL/L (ref 3.5–5.2)
PROT SERPL-MCNC: 6.6 G/DL (ref 6–8.5)
RBC # BLD AUTO: 3.31 10*6/MM3 (ref 3.77–5.28)
SODIUM SERPL-SCNC: 137 MMOL/L (ref 136–145)
WBC NRBC COR # BLD: 15.22 10*3/MM3 (ref 3.4–10.8)

## 2021-11-26 PROCEDURE — 80053 COMPREHEN METABOLIC PANEL: CPT | Performed by: INTERNAL MEDICINE

## 2021-11-26 PROCEDURE — 85025 COMPLETE CBC W/AUTO DIFF WBC: CPT | Performed by: INTERNAL MEDICINE

## 2021-11-26 PROCEDURE — 83036 HEMOGLOBIN GLYCOSYLATED A1C: CPT | Performed by: INTERNAL MEDICINE

## 2021-11-29 ENCOUNTER — LAB REQUISITION (OUTPATIENT)
Dept: LAB | Facility: HOSPITAL | Age: 56
End: 2021-11-29

## 2021-11-29 DIAGNOSIS — D51.9 VITAMIN B12 DEFICIENCY ANEMIA, UNSPECIFIED: ICD-10-CM

## 2021-11-29 DIAGNOSIS — E11.69 TYPE 2 DIABETES MELLITUS WITH OTHER SPECIFIED COMPLICATION (HCC): ICD-10-CM

## 2021-11-29 DIAGNOSIS — I50.9 HEART FAILURE, UNSPECIFIED (HCC): ICD-10-CM

## 2021-11-29 DIAGNOSIS — D64.9 ANEMIA, UNSPECIFIED: ICD-10-CM

## 2021-11-29 DIAGNOSIS — E03.8 OTHER SPECIFIED HYPOTHYROIDISM: ICD-10-CM

## 2021-11-29 LAB
ALBUMIN SERPL-MCNC: 2.67 G/DL (ref 3.5–5.2)
ALBUMIN/GLOB SERPL: 0.7 G/DL
ALP SERPL-CCNC: 186 U/L (ref 39–117)
ALT SERPL W P-5'-P-CCNC: 5 U/L (ref 1–33)
ANION GAP SERPL CALCULATED.3IONS-SCNC: 9.6 MMOL/L (ref 5–15)
AST SERPL-CCNC: 9 U/L (ref 1–32)
BASOPHILS # BLD AUTO: 0.15 10*3/MM3 (ref 0–0.2)
BASOPHILS NFR BLD AUTO: 0.9 % (ref 0–1.5)
BILIRUB SERPL-MCNC: 0.4 MG/DL (ref 0–1.2)
BUN SERPL-MCNC: 18 MG/DL (ref 6–20)
BUN/CREAT SERPL: 17.6 (ref 7–25)
CALCIUM SPEC-SCNC: 8.1 MG/DL (ref 8.6–10.5)
CHLORIDE SERPL-SCNC: 96 MMOL/L (ref 98–107)
CHOLEST SERPL-MCNC: 91 MG/DL (ref 0–200)
CO2 SERPL-SCNC: 27.4 MMOL/L (ref 22–29)
CREAT SERPL-MCNC: 1.02 MG/DL (ref 0.57–1)
CRP SERPL-MCNC: 4.14 MG/DL (ref 0–0.5)
DEPRECATED RDW RBC AUTO: 56.9 FL (ref 37–54)
EOSINOPHIL # BLD AUTO: 0.17 10*3/MM3 (ref 0–0.4)
EOSINOPHIL NFR BLD AUTO: 1 % (ref 0.3–6.2)
ERYTHROCYTE [DISTWIDTH] IN BLOOD BY AUTOMATED COUNT: 18.3 % (ref 12.3–15.4)
FOLATE SERPL-MCNC: 5.58 NG/ML (ref 4.78–24.2)
GFR SERPL CREATININE-BSD FRML MDRD: 56 ML/MIN/1.73
GLOBULIN UR ELPH-MCNC: 4 GM/DL
GLUCOSE SERPL-MCNC: 184 MG/DL (ref 65–99)
HCT VFR BLD AUTO: 28.5 % (ref 34–46.6)
HDLC SERPL-MCNC: 28 MG/DL (ref 40–60)
HGB BLD-MCNC: 8.7 G/DL (ref 12–15.9)
IMM GRANULOCYTES # BLD AUTO: 0.08 10*3/MM3 (ref 0–0.05)
IMM GRANULOCYTES NFR BLD AUTO: 0.5 % (ref 0–0.5)
LDLC SERPL CALC-MCNC: 38 MG/DL (ref 0–100)
LDLC/HDLC SERPL: 1.24 {RATIO}
LYMPHOCYTES # BLD AUTO: 3.13 10*3/MM3 (ref 0.7–3.1)
LYMPHOCYTES NFR BLD AUTO: 19.2 % (ref 19.6–45.3)
MCH RBC QN AUTO: 26.3 PG (ref 26.6–33)
MCHC RBC AUTO-ENTMCNC: 30.5 G/DL (ref 31.5–35.7)
MCV RBC AUTO: 86.1 FL (ref 79–97)
MONOCYTES # BLD AUTO: 0.87 10*3/MM3 (ref 0.1–0.9)
MONOCYTES NFR BLD AUTO: 5.4 % (ref 5–12)
NEUTROPHILS NFR BLD AUTO: 11.86 10*3/MM3 (ref 1.7–7)
NEUTROPHILS NFR BLD AUTO: 73 % (ref 42.7–76)
NRBC BLD AUTO-RTO: 0 /100 WBC (ref 0–0.2)
PLATELET # BLD AUTO: 494 10*3/MM3 (ref 140–450)
PMV BLD AUTO: 11 FL (ref 6–12)
POTASSIUM SERPL-SCNC: 4.7 MMOL/L (ref 3.5–5.2)
PROT SERPL-MCNC: 6.7 G/DL (ref 6–8.5)
RBC # BLD AUTO: 3.31 10*6/MM3 (ref 3.77–5.28)
SODIUM SERPL-SCNC: 133 MMOL/L (ref 136–145)
TRIGL SERPL-MCNC: 141 MG/DL (ref 0–150)
TSH SERPL DL<=0.05 MIU/L-ACNC: 8.37 UIU/ML (ref 0.27–4.2)
VIT B12 BLD-MCNC: 653 PG/ML (ref 211–946)
VLDLC SERPL-MCNC: 25 MG/DL (ref 5–40)
WBC NRBC COR # BLD: 16.26 10*3/MM3 (ref 3.4–10.8)

## 2021-11-29 PROCEDURE — 82746 ASSAY OF FOLIC ACID SERUM: CPT | Performed by: INTERNAL MEDICINE

## 2021-11-29 PROCEDURE — 86140 C-REACTIVE PROTEIN: CPT | Performed by: INTERNAL MEDICINE

## 2021-11-29 PROCEDURE — 85025 COMPLETE CBC W/AUTO DIFF WBC: CPT | Performed by: INTERNAL MEDICINE

## 2021-11-29 PROCEDURE — 80053 COMPREHEN METABOLIC PANEL: CPT | Performed by: INTERNAL MEDICINE

## 2021-11-29 PROCEDURE — 80061 LIPID PANEL: CPT | Performed by: INTERNAL MEDICINE

## 2021-11-29 PROCEDURE — 82607 VITAMIN B-12: CPT | Performed by: INTERNAL MEDICINE

## 2021-11-29 PROCEDURE — 84443 ASSAY THYROID STIM HORMONE: CPT | Performed by: INTERNAL MEDICINE

## 2021-12-01 ENCOUNTER — LAB REQUISITION (OUTPATIENT)
Dept: LAB | Facility: HOSPITAL | Age: 56
End: 2021-12-01

## 2021-12-01 PROCEDURE — 84436 ASSAY OF TOTAL THYROXINE: CPT | Performed by: INTERNAL MEDICINE

## 2021-12-02 ENCOUNTER — APPOINTMENT (OUTPATIENT)
Dept: GENERAL RADIOLOGY | Facility: HOSPITAL | Age: 56
End: 2021-12-02

## 2021-12-02 ENCOUNTER — HOSPITAL ENCOUNTER (EMERGENCY)
Facility: HOSPITAL | Age: 56
Discharge: NURSING, REHAB OR LONG-TERM CARE | End: 2021-12-02
Attending: EMERGENCY MEDICINE | Admitting: EMERGENCY MEDICINE

## 2021-12-02 ENCOUNTER — APPOINTMENT (OUTPATIENT)
Dept: CT IMAGING | Facility: HOSPITAL | Age: 56
End: 2021-12-02

## 2021-12-02 VITALS
RESPIRATION RATE: 18 BRPM | WEIGHT: 145 LBS | OXYGEN SATURATION: 98 % | TEMPERATURE: 98.5 F | BODY MASS INDEX: 24.16 KG/M2 | HEART RATE: 95 BPM | SYSTOLIC BLOOD PRESSURE: 110 MMHG | DIASTOLIC BLOOD PRESSURE: 77 MMHG | HEIGHT: 65 IN

## 2021-12-02 DIAGNOSIS — F11.10: Primary | ICD-10-CM

## 2021-12-02 LAB
ALBUMIN SERPL-MCNC: 2.61 G/DL (ref 3.5–5.2)
ALBUMIN/GLOB SERPL: 0.5 G/DL
ALP SERPL-CCNC: 180 U/L (ref 39–117)
ALT SERPL W P-5'-P-CCNC: 6 U/L (ref 1–33)
AMPHET+METHAMPHET UR QL: NEGATIVE
AMPHETAMINES UR QL: NEGATIVE
ANION GAP SERPL CALCULATED.3IONS-SCNC: 6.2 MMOL/L (ref 5–15)
APAP SERPL-MCNC: <5 MCG/ML (ref 0–30)
AST SERPL-CCNC: 14 U/L (ref 1–32)
BACTERIA UR QL AUTO: ABNORMAL /HPF
BARBITURATES UR QL SCN: NEGATIVE
BASOPHILS # BLD AUTO: 0.07 10*3/MM3 (ref 0–0.2)
BASOPHILS NFR BLD AUTO: 0.5 % (ref 0–1.5)
BENZODIAZ UR QL SCN: NEGATIVE
BILIRUB SERPL-MCNC: 0.3 MG/DL (ref 0–1.2)
BILIRUB UR QL STRIP: NEGATIVE
BUN SERPL-MCNC: 12 MG/DL (ref 6–20)
BUN/CREAT SERPL: 10.7 (ref 7–25)
BUPRENORPHINE SERPL-MCNC: NEGATIVE NG/ML
CALCIUM SPEC-SCNC: 8.3 MG/DL (ref 8.6–10.5)
CANNABINOIDS SERPL QL: NEGATIVE
CHLORIDE SERPL-SCNC: 99 MMOL/L (ref 98–107)
CLARITY UR: CLEAR
CO2 SERPL-SCNC: 28.8 MMOL/L (ref 22–29)
COCAINE UR QL: NEGATIVE
COLOR UR: ABNORMAL
CREAT SERPL-MCNC: 1.12 MG/DL (ref 0.57–1)
DEPRECATED RDW RBC AUTO: 57.4 FL (ref 37–54)
EOSINOPHIL # BLD AUTO: 0.5 10*3/MM3 (ref 0–0.4)
EOSINOPHIL NFR BLD AUTO: 3.8 % (ref 0.3–6.2)
ERYTHROCYTE [DISTWIDTH] IN BLOOD BY AUTOMATED COUNT: 18 % (ref 12.3–15.4)
ETHANOL BLD-MCNC: <10 MG/DL (ref 0–10)
ETHANOL UR QL: <0.01 %
GFR SERPL CREATININE-BSD FRML MDRD: 51 ML/MIN/1.73
GLOBULIN UR ELPH-MCNC: 4.9 GM/DL
GLUCOSE SERPL-MCNC: 199 MG/DL (ref 65–99)
GLUCOSE UR STRIP-MCNC: NEGATIVE MG/DL
HCT VFR BLD AUTO: 28.9 % (ref 34–46.6)
HGB BLD-MCNC: 8.5 G/DL (ref 12–15.9)
HGB UR QL STRIP.AUTO: ABNORMAL
HYALINE CASTS UR QL AUTO: ABNORMAL /LPF
IMM GRANULOCYTES # BLD AUTO: 0.04 10*3/MM3 (ref 0–0.05)
IMM GRANULOCYTES NFR BLD AUTO: 0.3 % (ref 0–0.5)
KETONES UR QL STRIP: NEGATIVE
LEUKOCYTE ESTERASE UR QL STRIP.AUTO: ABNORMAL
LYMPHOCYTES # BLD AUTO: 2.61 10*3/MM3 (ref 0.7–3.1)
LYMPHOCYTES NFR BLD AUTO: 20.1 % (ref 19.6–45.3)
MCH RBC QN AUTO: 25.5 PG (ref 26.6–33)
MCHC RBC AUTO-ENTMCNC: 29.4 G/DL (ref 31.5–35.7)
MCV RBC AUTO: 86.8 FL (ref 79–97)
METHADONE UR QL SCN: NEGATIVE
MONOCYTES # BLD AUTO: 0.78 10*3/MM3 (ref 0.1–0.9)
MONOCYTES NFR BLD AUTO: 6 % (ref 5–12)
NEUTROPHILS NFR BLD AUTO: 69.3 % (ref 42.7–76)
NEUTROPHILS NFR BLD AUTO: 9 10*3/MM3 (ref 1.7–7)
NITRITE UR QL STRIP: NEGATIVE
NRBC BLD AUTO-RTO: 0 /100 WBC (ref 0–0.2)
OPIATES UR QL: NEGATIVE
OXYCODONE UR QL SCN: POSITIVE
PCP UR QL SCN: NEGATIVE
PH UR STRIP.AUTO: 5.5 [PH] (ref 5–8)
PLATELET # BLD AUTO: 396 10*3/MM3 (ref 140–450)
PMV BLD AUTO: 10.1 FL (ref 6–12)
POTASSIUM SERPL-SCNC: 4.7 MMOL/L (ref 3.5–5.2)
PROPOXYPH UR QL: NEGATIVE
PROT SERPL-MCNC: 7.5 G/DL (ref 6–8.5)
PROT UR QL STRIP: ABNORMAL
RBC # BLD AUTO: 3.33 10*6/MM3 (ref 3.77–5.28)
RBC # UR STRIP: ABNORMAL /HPF
REF LAB TEST METHOD: ABNORMAL
SALICYLATES SERPL-MCNC: 1.2 MG/DL
SODIUM SERPL-SCNC: 134 MMOL/L (ref 136–145)
SP GR UR STRIP: 1.01 (ref 1–1.03)
SQUAMOUS #/AREA URNS HPF: ABNORMAL /HPF
T4 SERPL-MCNC: 9.16 MCG/DL (ref 4.5–11.7)
TRICYCLICS UR QL SCN: NEGATIVE
UROBILINOGEN UR QL STRIP: ABNORMAL
WBC # UR STRIP: ABNORMAL /HPF
WBC NRBC COR # BLD: 13 10*3/MM3 (ref 3.4–10.8)
YEAST URNS QL MICRO: ABNORMAL /HPF

## 2021-12-02 PROCEDURE — 80053 COMPREHEN METABOLIC PANEL: CPT | Performed by: PHYSICIAN ASSISTANT

## 2021-12-02 PROCEDURE — 99284 EMERGENCY DEPT VISIT MOD MDM: CPT

## 2021-12-02 PROCEDURE — 70450 CT HEAD/BRAIN W/O DYE: CPT | Performed by: RADIOLOGY

## 2021-12-02 PROCEDURE — 71045 X-RAY EXAM CHEST 1 VIEW: CPT

## 2021-12-02 PROCEDURE — 80179 DRUG ASSAY SALICYLATE: CPT | Performed by: PHYSICIAN ASSISTANT

## 2021-12-02 PROCEDURE — 93005 ELECTROCARDIOGRAM TRACING: CPT | Performed by: PHYSICIAN ASSISTANT

## 2021-12-02 PROCEDURE — 85025 COMPLETE CBC W/AUTO DIFF WBC: CPT | Performed by: PHYSICIAN ASSISTANT

## 2021-12-02 PROCEDURE — 70450 CT HEAD/BRAIN W/O DYE: CPT

## 2021-12-02 PROCEDURE — 96365 THER/PROPH/DIAG IV INF INIT: CPT

## 2021-12-02 PROCEDURE — 71045 X-RAY EXAM CHEST 1 VIEW: CPT | Performed by: RADIOLOGY

## 2021-12-02 PROCEDURE — 80143 DRUG ASSAY ACETAMINOPHEN: CPT | Performed by: PHYSICIAN ASSISTANT

## 2021-12-02 PROCEDURE — 93010 ELECTROCARDIOGRAM REPORT: CPT | Performed by: INTERNAL MEDICINE

## 2021-12-02 PROCEDURE — 82077 ASSAY SPEC XCP UR&BREATH IA: CPT | Performed by: PHYSICIAN ASSISTANT

## 2021-12-02 PROCEDURE — 81001 URINALYSIS AUTO W/SCOPE: CPT | Performed by: PHYSICIAN ASSISTANT

## 2021-12-02 PROCEDURE — 25010000002 CEFTRIAXONE PER 250 MG: Performed by: PHYSICIAN ASSISTANT

## 2021-12-02 PROCEDURE — 80306 DRUG TEST PRSMV INSTRMNT: CPT | Performed by: PHYSICIAN ASSISTANT

## 2021-12-02 RX ORDER — SODIUM CHLORIDE 0.9 % (FLUSH) 0.9 %
10 SYRINGE (ML) INJECTION AS NEEDED
Status: DISCONTINUED | OUTPATIENT
Start: 2021-12-02 | End: 2021-12-02 | Stop reason: HOSPADM

## 2021-12-02 RX ADMIN — SODIUM CHLORIDE 1000 ML: 9 INJECTION, SOLUTION INTRAVENOUS at 13:48

## 2021-12-02 RX ADMIN — CEFTRIAXONE 1 G: 1 INJECTION, POWDER, FOR SOLUTION INTRAMUSCULAR; INTRAVENOUS at 13:49

## 2021-12-02 NOTE — ED NOTES
Connie from nursing home called said to call Rafaela at [phone] to give update.      Nathaly Benson  12/02/21 1448       Nathaly Benson  12/02/21 8729

## 2021-12-02 NOTE — ED NOTES
Spoke with ANKITA Silva from the [facility] gave them an update on pt status     Christopher Cannon RN  12/02/21 9225

## 2021-12-02 NOTE — ED PROVIDER NOTES
Subjective   This is a 56 year old female patient who presents to the ER with chief complaint of drug overdose. PMH significant for COPD on 2L of oxygen all the time, DM insulin dependent, HTN. Patient is a resident at [facility] doing a rehab program for her left sided AKA that was done about 2 weeks ago. She had her ex  bring in some of her pain medication, oxycodone 15 mg, for her to take at her own discretion at the NH. The staff found the bottle and noticed that it was nearly empty though it had just been filled. They were afraid she had taken them all. She says she took them as they were prescribed to her. She has no complaints other than being tired.           Review of Systems   Constitutional: Positive for fatigue. Negative for activity change, appetite change, chills, diaphoresis, fever and unexpected weight change.   HENT: Negative.    Respiratory: Negative.    Cardiovascular: Negative.  Negative for chest pain.   Gastrointestinal: Negative.  Negative for abdominal pain.   Endocrine: Negative.    Genitourinary: Negative.  Negative for dysuria.   Skin: Negative.    Neurological: Negative.    Psychiatric/Behavioral: Negative.    All other systems reviewed and are negative.      Past Medical History:   Diagnosis Date   • Chronic pain disorder     back pain   • COPD (chronic obstructive pulmonary disease) (CMS/Formerly [facility])    • DDD (degenerative disc disease), lumbar    • Diabetes (CMS/Formerly [facility])    • Hypertension    • IBS (irritable bowel syndrome)    • Neuropathy    • Sleep apnea    • Spinal stenosis    • Stroke (CMS/Formerly [facility])    • UTI (urinary tract infection)        No Known Allergies    Past Surgical History:   Procedure Laterality Date   • CHOLECYSTECTOMY      2004   • SUBTOTAL HYSTERECTOMY      2009   • TRANS METATARSAL AMPUTATION         Family History   Problem Relation Age of Onset   • Anxiety disorder Mother    • No Known Problems Father    • No Known Problems Sister        Social History     Socioeconomic  History   • Marital status:    • Number of children: 1   • Highest education level: 9th grade   Tobacco Use   • Smoking status: Current Every Day Smoker     Packs/day: 1.00     Years: 11.00     Pack years: 11.00     Types: Cigarettes   • Smokeless tobacco: Never Used   • Tobacco comment: Encouraged smoking cessation   Vaping Use   • Vaping Use: Never used   Substance and Sexual Activity   • Alcohol use: No   • Drug use: Yes     Comment: Prescribed Oxycodone and Gabapentin   • Sexual activity: Not Currently     Partners: Male           Objective   Physical Exam  Vitals and nursing note reviewed.   Constitutional:       General: She is not in acute distress.     Appearance: She is well-developed. She is not diaphoretic.   HENT:      Head: Normocephalic and atraumatic.      Right Ear: External ear normal.      Left Ear: External ear normal.      Nose: Nose normal.   Eyes:      Conjunctiva/sclera: Conjunctivae normal.      Pupils: Pupils are equal, round, and reactive to light.   Neck:      Vascular: No JVD.      Trachea: No tracheal deviation.   Cardiovascular:      Rate and Rhythm: Normal rate and regular rhythm.      Heart sounds: Normal heart sounds. No murmur heard.      Pulmonary:      Effort: Pulmonary effort is normal. No respiratory distress.      Breath sounds: Normal breath sounds. No wheezing.   Abdominal:      General: Bowel sounds are normal.      Palpations: Abdomen is soft.      Tenderness: There is no abdominal tenderness.   Musculoskeletal:         General: No deformity. Normal range of motion.      Cervical back: Normal range of motion and neck supple.   Skin:     General: Skin is warm and dry.      Coloration: Skin is not pale.      Findings: No erythema or rash.   Neurological:      General: No focal deficit present.      Mental Status: She is alert and oriented to person, place, and time. Mental status is at baseline.      Cranial Nerves: No cranial nerve deficit.      Sensory: No sensory  deficit.      Motor: No weakness.      Coordination: Coordination normal.      Gait: Gait normal.      Deep Tendon Reflexes: Reflexes normal.      Comments: Normal EOMs and pupillary reflexes bilaterally. 5/5  and plantar flexion strength bilaterally. Can elevate all 4 extremities and hold them at 90 degrees. No facial asymmetry or slurred speech.    Psychiatric:         Behavior: Behavior normal.         Thought Content: Thought content normal.         Procedures           ED Course  ED Course as of 12/02/21 1514   Thu Dec 02, 2021   1339 XR Chest 1 View  IMPRESSION:  Diffuse airspace disease in the left lung     This report was finalized on 12/2/2021 1:22 PM by Dr. Vick Tamayo MD. [MM]   1455 CT Head Without Contrast  IMPRESSION:  No acute intracranial pathology. Nothing is seen on this exam to  specifically account for the patient's symptoms.     This report was finalized on 12/2/2021 2:50 PM by Dr. Vick Tamayo MD. [MM]   1513 Talked to the patient about drug detox in [facility]. She says she isn't addicted to drugs and doesn't need rehab. Denies SI and HI.  [MM]      ED Course User Index  [MM] Graciela Kim PA                                                 MDM  Number of Diagnoses or Management Options     Amount and/or Complexity of Data Reviewed  Clinical lab tests: ordered and reviewed  Tests in the radiology section of CPT®: reviewed and ordered  Decide to obtain previous medical records or to obtain history from someone other than the patient: yes  Discuss the patient with other providers: yes        Final diagnoses:   Mild oxycodone abuse (HCC)       ED Disposition  ED Disposition     ED Disposition Condition Comment    Discharge Stable           Miki Christianson MD  [address] [facility] [address] [phone]    In 2 days           Medication List      No changes were made to your prescriptions during this visit.          Graciela Kim PA  12/02/21 1515

## 2021-12-02 NOTE — ED NOTES
Gave report to [facility], report given to Hilario Marte, HILARIO White  12/02/21 7068

## 2021-12-02 NOTE — ED NOTES
Called [facility] for transport, she stated she would let them know.      Nathaly Benson  12/02/21 4235

## 2021-12-02 NOTE — ED NOTES
Called [facility] for transport awaiting call back.      Nathaly Benson  12/02/21 3524

## 2021-12-03 LAB
QT INTERVAL: 366 MS
QTC INTERVAL: 477 MS

## 2021-12-06 ENCOUNTER — LAB REQUISITION (OUTPATIENT)
Dept: LAB | Facility: HOSPITAL | Age: 56
End: 2021-12-06

## 2021-12-06 DIAGNOSIS — I10 ESSENTIAL (PRIMARY) HYPERTENSION: ICD-10-CM

## 2021-12-06 LAB
ALBUMIN SERPL-MCNC: 2.38 G/DL (ref 3.5–5.2)
ALBUMIN/GLOB SERPL: 0.5 G/DL
ALP SERPL-CCNC: 148 U/L (ref 39–117)
ALT SERPL W P-5'-P-CCNC: 10 U/L (ref 1–33)
ANION GAP SERPL CALCULATED.3IONS-SCNC: 7.8 MMOL/L (ref 5–15)
AST SERPL-CCNC: 15 U/L (ref 1–32)
BASOPHILS # BLD AUTO: 0.11 10*3/MM3 (ref 0–0.2)
BASOPHILS NFR BLD AUTO: 0.9 % (ref 0–1.5)
BILIRUB SERPL-MCNC: 0.2 MG/DL (ref 0–1.2)
BUN SERPL-MCNC: 10 MG/DL (ref 6–20)
BUN/CREAT SERPL: 11 (ref 7–25)
CALCIUM SPEC-SCNC: 8.1 MG/DL (ref 8.6–10.5)
CHLORIDE SERPL-SCNC: 103 MMOL/L (ref 98–107)
CO2 SERPL-SCNC: 27.2 MMOL/L (ref 22–29)
CREAT SERPL-MCNC: 0.91 MG/DL (ref 0.57–1)
DEPRECATED RDW RBC AUTO: 57.7 FL (ref 37–54)
EOSINOPHIL # BLD AUTO: 0.7 10*3/MM3 (ref 0–0.4)
EOSINOPHIL NFR BLD AUTO: 5.8 % (ref 0.3–6.2)
ERYTHROCYTE [DISTWIDTH] IN BLOOD BY AUTOMATED COUNT: 18.2 % (ref 12.3–15.4)
GFR SERPL CREATININE-BSD FRML MDRD: 64 ML/MIN/1.73
GLOBULIN UR ELPH-MCNC: 4.4 GM/DL
GLUCOSE SERPL-MCNC: 158 MG/DL (ref 65–99)
HCT VFR BLD AUTO: 29.2 % (ref 34–46.6)
HGB BLD-MCNC: 8.7 G/DL (ref 12–15.9)
IMM GRANULOCYTES # BLD AUTO: 0.08 10*3/MM3 (ref 0–0.05)
IMM GRANULOCYTES NFR BLD AUTO: 0.7 % (ref 0–0.5)
LYMPHOCYTES # BLD AUTO: 3.89 10*3/MM3 (ref 0.7–3.1)
LYMPHOCYTES NFR BLD AUTO: 32.2 % (ref 19.6–45.3)
MCH RBC QN AUTO: 26 PG (ref 26.6–33)
MCHC RBC AUTO-ENTMCNC: 29.8 G/DL (ref 31.5–35.7)
MCV RBC AUTO: 87.4 FL (ref 79–97)
MONOCYTES # BLD AUTO: 0.88 10*3/MM3 (ref 0.1–0.9)
MONOCYTES NFR BLD AUTO: 7.3 % (ref 5–12)
NEUTROPHILS NFR BLD AUTO: 53.1 % (ref 42.7–76)
NEUTROPHILS NFR BLD AUTO: 6.43 10*3/MM3 (ref 1.7–7)
NRBC BLD AUTO-RTO: 0 /100 WBC (ref 0–0.2)
PLATELET # BLD AUTO: 488 10*3/MM3 (ref 140–450)
PMV BLD AUTO: 10.7 FL (ref 6–12)
POTASSIUM SERPL-SCNC: 4.2 MMOL/L (ref 3.5–5.2)
PROT SERPL-MCNC: 6.8 G/DL (ref 6–8.5)
RBC # BLD AUTO: 3.34 10*6/MM3 (ref 3.77–5.28)
SODIUM SERPL-SCNC: 138 MMOL/L (ref 136–145)
WBC NRBC COR # BLD: 12.09 10*3/MM3 (ref 3.4–10.8)

## 2021-12-06 PROCEDURE — 80053 COMPREHEN METABOLIC PANEL: CPT | Performed by: INTERNAL MEDICINE

## 2021-12-06 PROCEDURE — 85025 COMPLETE CBC W/AUTO DIFF WBC: CPT | Performed by: INTERNAL MEDICINE

## 2021-12-13 ENCOUNTER — LAB REQUISITION (OUTPATIENT)
Dept: LAB | Facility: HOSPITAL | Age: 56
End: 2021-12-13

## 2021-12-13 DIAGNOSIS — D64.9 ANEMIA, UNSPECIFIED: ICD-10-CM

## 2021-12-13 LAB
ANION GAP SERPL CALCULATED.3IONS-SCNC: 11.1 MMOL/L (ref 5–15)
BASOPHILS # BLD AUTO: 0.14 10*3/MM3 (ref 0–0.2)
BASOPHILS NFR BLD AUTO: 1 % (ref 0–1.5)
BUN SERPL-MCNC: 24 MG/DL (ref 6–20)
BUN/CREAT SERPL: 23.8 (ref 7–25)
CALCIUM SPEC-SCNC: 8.8 MG/DL (ref 8.6–10.5)
CHLORIDE SERPL-SCNC: 102 MMOL/L (ref 98–107)
CO2 SERPL-SCNC: 26.9 MMOL/L (ref 22–29)
CREAT SERPL-MCNC: 1.01 MG/DL (ref 0.57–1)
CRP SERPL-MCNC: 0.5 MG/DL (ref 0–0.5)
DEPRECATED RDW RBC AUTO: 59.4 FL (ref 37–54)
EOSINOPHIL # BLD AUTO: 0.38 10*3/MM3 (ref 0–0.4)
EOSINOPHIL NFR BLD AUTO: 2.6 % (ref 0.3–6.2)
ERYTHROCYTE [DISTWIDTH] IN BLOOD BY AUTOMATED COUNT: 18.5 % (ref 12.3–15.4)
GFR SERPL CREATININE-BSD FRML MDRD: 57 ML/MIN/1.73
GLUCOSE SERPL-MCNC: 190 MG/DL (ref 65–99)
HCT VFR BLD AUTO: 31 % (ref 34–46.6)
HGB BLD-MCNC: 9.3 G/DL (ref 12–15.9)
IMM GRANULOCYTES # BLD AUTO: 0.08 10*3/MM3 (ref 0–0.05)
IMM GRANULOCYTES NFR BLD AUTO: 0.6 % (ref 0–0.5)
LYMPHOCYTES # BLD AUTO: 4.02 10*3/MM3 (ref 0.7–3.1)
LYMPHOCYTES NFR BLD AUTO: 27.8 % (ref 19.6–45.3)
MCH RBC QN AUTO: 26.1 PG (ref 26.6–33)
MCHC RBC AUTO-ENTMCNC: 30 G/DL (ref 31.5–35.7)
MCV RBC AUTO: 87.1 FL (ref 79–97)
MONOCYTES # BLD AUTO: 0.87 10*3/MM3 (ref 0.1–0.9)
MONOCYTES NFR BLD AUTO: 6 % (ref 5–12)
NEUTROPHILS NFR BLD AUTO: 62 % (ref 42.7–76)
NEUTROPHILS NFR BLD AUTO: 8.96 10*3/MM3 (ref 1.7–7)
NRBC BLD AUTO-RTO: 0 /100 WBC (ref 0–0.2)
PLATELET # BLD AUTO: 539 10*3/MM3 (ref 140–450)
PMV BLD AUTO: 11.1 FL (ref 6–12)
POTASSIUM SERPL-SCNC: 4 MMOL/L (ref 3.5–5.2)
RBC # BLD AUTO: 3.56 10*6/MM3 (ref 3.77–5.28)
SODIUM SERPL-SCNC: 140 MMOL/L (ref 136–145)
WBC NRBC COR # BLD: 14.45 10*3/MM3 (ref 3.4–10.8)

## 2021-12-13 PROCEDURE — 85025 COMPLETE CBC W/AUTO DIFF WBC: CPT | Performed by: INTERNAL MEDICINE

## 2021-12-13 PROCEDURE — 80048 BASIC METABOLIC PNL TOTAL CA: CPT | Performed by: INTERNAL MEDICINE

## 2021-12-13 PROCEDURE — 86140 C-REACTIVE PROTEIN: CPT | Performed by: INTERNAL MEDICINE

## 2021-12-20 ENCOUNTER — LAB REQUISITION (OUTPATIENT)
Dept: LAB | Facility: HOSPITAL | Age: 56
End: 2021-12-20

## 2021-12-20 DIAGNOSIS — D64.9 ANEMIA, UNSPECIFIED: ICD-10-CM

## 2021-12-20 LAB
ANION GAP SERPL CALCULATED.3IONS-SCNC: 12.2 MMOL/L (ref 5–15)
BASOPHILS # BLD AUTO: 0.14 10*3/MM3 (ref 0–0.2)
BASOPHILS NFR BLD AUTO: 0.8 % (ref 0–1.5)
BUN SERPL-MCNC: 13 MG/DL (ref 6–20)
BUN/CREAT SERPL: 15.5 (ref 7–25)
CALCIUM SPEC-SCNC: 8.8 MG/DL (ref 8.6–10.5)
CHLORIDE SERPL-SCNC: 100 MMOL/L (ref 98–107)
CO2 SERPL-SCNC: 26.8 MMOL/L (ref 22–29)
CREAT SERPL-MCNC: 0.84 MG/DL (ref 0.57–1)
CRP SERPL-MCNC: 0.68 MG/DL (ref 0–0.5)
DEPRECATED RDW RBC AUTO: 57.1 FL (ref 37–54)
EOSINOPHIL # BLD AUTO: 0.23 10*3/MM3 (ref 0–0.4)
EOSINOPHIL NFR BLD AUTO: 1.3 % (ref 0.3–6.2)
ERYTHROCYTE [DISTWIDTH] IN BLOOD BY AUTOMATED COUNT: 18.1 % (ref 12.3–15.4)
GFR SERPL CREATININE-BSD FRML MDRD: 70 ML/MIN/1.73
GLUCOSE SERPL-MCNC: 115 MG/DL (ref 65–99)
HCT VFR BLD AUTO: 33.6 % (ref 34–46.6)
HGB BLD-MCNC: 10.2 G/DL (ref 12–15.9)
IMM GRANULOCYTES # BLD AUTO: 0.06 10*3/MM3 (ref 0–0.05)
IMM GRANULOCYTES NFR BLD AUTO: 0.3 % (ref 0–0.5)
LYMPHOCYTES # BLD AUTO: 4.37 10*3/MM3 (ref 0.7–3.1)
LYMPHOCYTES NFR BLD AUTO: 25.1 % (ref 19.6–45.3)
MCH RBC QN AUTO: 26.2 PG (ref 26.6–33)
MCHC RBC AUTO-ENTMCNC: 30.4 G/DL (ref 31.5–35.7)
MCV RBC AUTO: 86.2 FL (ref 79–97)
MONOCYTES # BLD AUTO: 1.01 10*3/MM3 (ref 0.1–0.9)
MONOCYTES NFR BLD AUTO: 5.8 % (ref 5–12)
NEUTROPHILS NFR BLD AUTO: 11.58 10*3/MM3 (ref 1.7–7)
NEUTROPHILS NFR BLD AUTO: 66.7 % (ref 42.7–76)
NRBC BLD AUTO-RTO: 0 /100 WBC (ref 0–0.2)
PLATELET # BLD AUTO: 412 10*3/MM3 (ref 140–450)
PMV BLD AUTO: 11.5 FL (ref 6–12)
POTASSIUM SERPL-SCNC: 4.2 MMOL/L (ref 3.5–5.2)
RBC # BLD AUTO: 3.9 10*6/MM3 (ref 3.77–5.28)
SODIUM SERPL-SCNC: 139 MMOL/L (ref 136–145)
WBC NRBC COR # BLD: 17.39 10*3/MM3 (ref 3.4–10.8)

## 2021-12-20 PROCEDURE — 86140 C-REACTIVE PROTEIN: CPT | Performed by: INTERNAL MEDICINE

## 2021-12-20 PROCEDURE — 80048 BASIC METABOLIC PNL TOTAL CA: CPT | Performed by: INTERNAL MEDICINE

## 2021-12-20 PROCEDURE — 85025 COMPLETE CBC W/AUTO DIFF WBC: CPT | Performed by: INTERNAL MEDICINE

## 2021-12-21 ENCOUNTER — LAB REQUISITION (OUTPATIENT)
Dept: LAB | Facility: HOSPITAL | Age: 56
End: 2021-12-21

## 2021-12-21 DIAGNOSIS — N39.0 URINARY TRACT INFECTION, SITE NOT SPECIFIED: ICD-10-CM

## 2021-12-21 LAB
BACTERIA UR QL AUTO: ABNORMAL /HPF
BILIRUB UR QL STRIP: NEGATIVE
CLARITY UR: ABNORMAL
COLOR UR: YELLOW
GLUCOSE UR STRIP-MCNC: NEGATIVE MG/DL
HGB UR QL STRIP.AUTO: NEGATIVE
HYALINE CASTS UR QL AUTO: ABNORMAL /LPF
KETONES UR QL STRIP: NEGATIVE
LEUKOCYTE ESTERASE UR QL STRIP.AUTO: NEGATIVE
NITRITE UR QL STRIP: NEGATIVE
PH UR STRIP.AUTO: 6 [PH] (ref 5–8)
PROT UR QL STRIP: ABNORMAL
RBC # UR STRIP: ABNORMAL /HPF
REF LAB TEST METHOD: ABNORMAL
SP GR UR STRIP: 1.02 (ref 1–1.03)
SQUAMOUS #/AREA URNS HPF: ABNORMAL /HPF
UROBILINOGEN UR QL STRIP: ABNORMAL
WBC # UR STRIP: ABNORMAL /HPF

## 2021-12-21 PROCEDURE — 81001 URINALYSIS AUTO W/SCOPE: CPT | Performed by: INTERNAL MEDICINE

## 2021-12-27 ENCOUNTER — LAB REQUISITION (OUTPATIENT)
Dept: LAB | Facility: HOSPITAL | Age: 56
End: 2021-12-27

## 2021-12-27 DIAGNOSIS — D64.9 ANEMIA, UNSPECIFIED: ICD-10-CM

## 2021-12-27 LAB
ANION GAP SERPL CALCULATED.3IONS-SCNC: 11.7 MMOL/L (ref 5–15)
BASOPHILS # BLD AUTO: 0.13 10*3/MM3 (ref 0–0.2)
BASOPHILS NFR BLD AUTO: 0.9 % (ref 0–1.5)
BUN SERPL-MCNC: 15 MG/DL (ref 6–20)
BUN/CREAT SERPL: 17.9 (ref 7–25)
CALCIUM SPEC-SCNC: 8.7 MG/DL (ref 8.6–10.5)
CHLORIDE SERPL-SCNC: 102 MMOL/L (ref 98–107)
CO2 SERPL-SCNC: 27.3 MMOL/L (ref 22–29)
CREAT SERPL-MCNC: 0.84 MG/DL (ref 0.57–1)
CRP SERPL-MCNC: 1.05 MG/DL (ref 0–0.5)
DEPRECATED RDW RBC AUTO: 57.5 FL (ref 37–54)
EOSINOPHIL # BLD AUTO: 0.45 10*3/MM3 (ref 0–0.4)
EOSINOPHIL NFR BLD AUTO: 3 % (ref 0.3–6.2)
ERYTHROCYTE [DISTWIDTH] IN BLOOD BY AUTOMATED COUNT: 18 % (ref 12.3–15.4)
GFR SERPL CREATININE-BSD FRML MDRD: 70 ML/MIN/1.73
GLUCOSE SERPL-MCNC: 150 MG/DL (ref 65–99)
HCT VFR BLD AUTO: 33.3 % (ref 34–46.6)
HGB BLD-MCNC: 10 G/DL (ref 12–15.9)
IMM GRANULOCYTES # BLD AUTO: 0.07 10*3/MM3 (ref 0–0.05)
IMM GRANULOCYTES NFR BLD AUTO: 0.5 % (ref 0–0.5)
LYMPHOCYTES # BLD AUTO: 3.8 10*3/MM3 (ref 0.7–3.1)
LYMPHOCYTES NFR BLD AUTO: 25.5 % (ref 19.6–45.3)
MCH RBC QN AUTO: 26 PG (ref 26.6–33)
MCHC RBC AUTO-ENTMCNC: 30 G/DL (ref 31.5–35.7)
MCV RBC AUTO: 86.7 FL (ref 79–97)
MONOCYTES # BLD AUTO: 0.9 10*3/MM3 (ref 0.1–0.9)
MONOCYTES NFR BLD AUTO: 6 % (ref 5–12)
NEUTROPHILS NFR BLD AUTO: 64.1 % (ref 42.7–76)
NEUTROPHILS NFR BLD AUTO: 9.55 10*3/MM3 (ref 1.7–7)
NRBC BLD AUTO-RTO: 0 /100 WBC (ref 0–0.2)
PLATELET # BLD AUTO: 333 10*3/MM3 (ref 140–450)
PMV BLD AUTO: 12.1 FL (ref 6–12)
POTASSIUM SERPL-SCNC: 4.7 MMOL/L (ref 3.5–5.2)
RBC # BLD AUTO: 3.84 10*6/MM3 (ref 3.77–5.28)
SODIUM SERPL-SCNC: 141 MMOL/L (ref 136–145)
WBC NRBC COR # BLD: 14.9 10*3/MM3 (ref 3.4–10.8)

## 2021-12-27 PROCEDURE — 86140 C-REACTIVE PROTEIN: CPT | Performed by: INTERNAL MEDICINE

## 2021-12-27 PROCEDURE — 85025 COMPLETE CBC W/AUTO DIFF WBC: CPT | Performed by: INTERNAL MEDICINE

## 2021-12-27 PROCEDURE — 80048 BASIC METABOLIC PNL TOTAL CA: CPT | Performed by: INTERNAL MEDICINE

## 2022-01-13 ENCOUNTER — OFFICE VISIT (OUTPATIENT)
Dept: FAMILY MEDICINE CLINIC | Facility: CLINIC | Age: 57
End: 2022-01-13

## 2022-01-13 VITALS
WEIGHT: 146 LBS | BODY MASS INDEX: 24.32 KG/M2 | DIASTOLIC BLOOD PRESSURE: 58 MMHG | TEMPERATURE: 97.9 F | SYSTOLIC BLOOD PRESSURE: 102 MMHG | HEIGHT: 65 IN | OXYGEN SATURATION: 99 % | HEART RATE: 93 BPM

## 2022-01-13 DIAGNOSIS — E11.42 TYPE 2 DIABETES MELLITUS WITH PERIPHERAL NEUROPATHY: Chronic | ICD-10-CM

## 2022-01-13 DIAGNOSIS — K21.9 GASTROESOPHAGEAL REFLUX DISEASE WITHOUT ESOPHAGITIS: Chronic | ICD-10-CM

## 2022-01-13 DIAGNOSIS — F17.200 CURRENT SMOKER: Chronic | ICD-10-CM

## 2022-01-13 DIAGNOSIS — Z89.612 HISTORY OF ABOVE KNEE AMPUTATION, LEFT: Primary | ICD-10-CM

## 2022-01-13 DIAGNOSIS — E55.9 VITAMIN D DEFICIENCY: ICD-10-CM

## 2022-01-13 DIAGNOSIS — I10 ESSENTIAL HYPERTENSION: Chronic | ICD-10-CM

## 2022-01-13 DIAGNOSIS — E78.2 MIXED HYPERLIPIDEMIA: Chronic | ICD-10-CM

## 2022-01-13 PROBLEM — L08.9 DIABETIC FOOT INFECTION: Status: RESOLVED | Noted: 2018-12-04 | Resolved: 2022-01-13

## 2022-01-13 PROBLEM — E11.628 DIABETIC FOOT INFECTION: Status: RESOLVED | Noted: 2018-12-04 | Resolved: 2022-01-13

## 2022-01-13 PROCEDURE — 99214 OFFICE O/P EST MOD 30 MIN: CPT | Performed by: NURSE PRACTITIONER

## 2022-01-13 RX ORDER — DIPHENHYDRAMINE HYDROCHLORIDE 25 MG/1
1 CAPSULE, LIQUID FILLED ORAL 4 TIMES DAILY
Qty: 1 EACH | Refills: 0 | Status: SHIPPED | OUTPATIENT
Start: 2022-01-13 | End: 2023-04-04

## 2022-01-13 RX ORDER — LISINOPRIL AND HYDROCHLOROTHIAZIDE 25; 20 MG/1; MG/1
1 TABLET ORAL DAILY
Qty: 30 TABLET | Refills: 3 | Status: SHIPPED | OUTPATIENT
Start: 2022-01-13 | End: 2022-10-08 | Stop reason: SDUPTHER

## 2022-01-13 RX ORDER — PANTOPRAZOLE SODIUM 40 MG/1
40 TABLET, DELAYED RELEASE ORAL DAILY
Qty: 30 TABLET | Refills: 3 | Status: SHIPPED | OUTPATIENT
Start: 2022-01-13 | End: 2022-05-17

## 2022-01-13 RX ORDER — ROSUVASTATIN CALCIUM 40 MG/1
40 TABLET, COATED ORAL NIGHTLY
Qty: 30 TABLET | Refills: 3 | Status: SHIPPED | OUTPATIENT
Start: 2022-01-13 | End: 2022-04-12 | Stop reason: SDUPTHER

## 2022-01-13 RX ORDER — ICOSAPENT ETHYL 1000 MG/1
2 CAPSULE ORAL 2 TIMES DAILY WITH MEALS
Qty: 120 CAPSULE | Refills: 3 | Status: SHIPPED | OUTPATIENT
Start: 2022-01-13 | End: 2022-10-08 | Stop reason: SDUPTHER

## 2022-01-13 RX ORDER — CALCIUM CARBONATE 500(1250)
500 TABLET ORAL 2 TIMES DAILY
Qty: 60 TABLET | Refills: 5 | Status: SHIPPED | OUTPATIENT
Start: 2022-01-13 | End: 2023-04-04

## 2022-01-13 RX ORDER — ERGOCALCIFEROL 1.25 MG/1
50000 CAPSULE ORAL
Qty: 5 CAPSULE | Refills: 3 | Status: SHIPPED | OUTPATIENT
Start: 2022-01-13 | End: 2022-04-12 | Stop reason: SDUPTHER

## 2022-01-13 RX ORDER — HYDROXYZINE HYDROCHLORIDE 25 MG/1
TABLET, FILM COATED ORAL
COMMUNITY
Start: 2021-12-23 | End: 2022-01-13

## 2022-01-13 RX ORDER — DIPHENOXYLATE HYDROCHLORIDE AND ATROPINE SULFATE 2.5; .025 MG/1; MG/1
1 TABLET ORAL DAILY
Qty: 30 TABLET | Refills: 3 | Status: SHIPPED | OUTPATIENT
Start: 2022-01-13 | End: 2023-04-04

## 2022-01-13 RX ORDER — CALCIUM CARBONATE 500(1250)
TABLET ORAL 2 TIMES DAILY
COMMUNITY
End: 2022-01-13 | Stop reason: SDUPTHER

## 2022-01-13 RX ORDER — HYDROXYZINE HYDROCHLORIDE 25 MG/1
25 TABLET, FILM COATED ORAL EVERY 6 HOURS PRN
Qty: 60 TABLET | Refills: 3 | Status: SHIPPED | OUTPATIENT
Start: 2022-01-13 | End: 2022-05-17 | Stop reason: SDUPTHER

## 2022-01-13 RX ORDER — INSULIN DETEMIR 100 [IU]/ML
50 INJECTION, SOLUTION SUBCUTANEOUS DAILY
Qty: 15 PEN | Refills: 0 | Status: SHIPPED | OUTPATIENT
Start: 2022-01-13 | End: 2022-05-31 | Stop reason: SDUPTHER

## 2022-01-13 RX ORDER — DIPHENOXYLATE HYDROCHLORIDE AND ATROPINE SULFATE 2.5; .025 MG/1; MG/1
TABLET ORAL DAILY
COMMUNITY
End: 2022-01-13 | Stop reason: SDUPTHER

## 2022-01-13 NOTE — PROGRESS NOTES
Chief Complaint  Hospital Follow Up Visit    Subjective          Jocelyn Palomo is a 56 y.o. female who presents today to [facility] for follow up    HPI:   Patient presents today for a follow up visit. Since her last follow up appointment, patient has had a left above the knee amputation. Sister is present with patient today. She states that the left foot was chronically infected and never fully improved as patient kept foot wrapped and done little to no wound care on the foot. Sister states at the time patient finally agreed to have it checked, the foot was black and Dr. Duarte at [facility] decided to proceed with immediate amputation. Patient has been receiving rehabilitation services at the [facility]. She is here today in a wheelchair, plans on getting a prostetic leg as soon as possible. Patient states that incision site has been healing well, no sign or symptom of infection, sister has been helping with her care. Patient states she is taking her medication as prescribed and checking blood sugar regularly. Other medical history includes hypertension, hyperlidpidemia, chronic pain syndrome, depression, and gerd. Patient states that she continues to smoke but not as much as before, during rehabilitation she was limited to about 6-8 cigarettes per day versus smoking 1-2 packs at home, no desire to quit at this time.        Objective     Problem List:  Patient Active Problem List   Diagnosis   • Recurrent major depressive disorder, in partial remission (HCC)   • Gastroesophageal reflux disease without esophagitis   • Chronic pain syndrome   • Type 2 diabetes mellitus with peripheral neuropathy (HCC)   • Mixed hyperlipidemia   • Essential hypertension   • Current smoker   • Encounter for immunization   • Healthcare maintenance   • Renal insufficiency   • History of transmetatarsal amputation of left foot (HCC)       Allergy:   No Known Allergies     Discontinued  Medications:  Medications Discontinued During This Encounter   Medication Reason   • fluconazole (DIFLUCAN) 150 MG tablet *Therapy completed   • promethazine (PHENERGAN) 25 MG tablet Historical Med - Therapy completed   • Continuous Blood Gluc Transmit (Dexcom G6 Transmitter) misc Historical Med - Therapy completed   • Continuous Blood Gluc Sensor (Dexcom G6 Sensor) Historical Med - Therapy completed   • Continuous Blood Gluc  (Dexcom G6 ) device Historical Med - Therapy completed   • hydrOXYzine (ATARAX) 25 MG tablet Historical Med - Therapy completed   • LORazepam (ATIVAN) 1 MG tablet Historical Med - Therapy completed   • potassium chloride (K-DUR,KLOR-CON) 10 MEQ CR tablet Historical Med - Therapy completed   • lisinopril-hydrochlorothiazide (PRINZIDE,ZESTORETIC) 20-25 MG per tablet *Therapy completed   • Blood Glucose Monitoring Suppl (BLOOD GLUCOSE MONITOR SYSTEM) w/Device kit Reorder   • rosuvastatin (CRESTOR) 40 MG tablet Reorder   • icosapent ethyl (Vascepa) 1 g capsule capsule    • lisinopril-hydrochlorothiazide (PRINZIDE,ZESTORETIC) 20-25 MG per tablet Reorder   • insulin aspart (novoLOG FLEXPEN) 100 UNIT/ML solution pen-injector sc pen    • vitamin D (ERGOCALCIFEROL) 1.25 MG (67580 UT) capsule capsule Reorder   • Levemir FlexTouch 100 UNIT/ML injection    • pantoprazole (PROTONIX) 40 MG EC tablet Reorder   • calcium carbonate, oyster shell, 500 MG tablet tablet Reorder   • multivitamin (Tab-A-Cammie/Beta Carotene) tablet tablet Reorder   • oxyCODONE (ROXICODONE) 15 MG immediate release tablet Historical Med - Therapy completed       Current Medications:   Current Outpatient Medications   Medication Sig Dispense Refill   • acetaminophen (TYLENOL) 325 MG tablet Take 650 mg by mouth As Needed for Mild Pain .     • albuterol (PROVENTIL,VENTOLIN) 2 MG/5ML syrup Take 5-10 mL by mouth Every 6 (Six) Hours As Needed (cough). 60 mL 0   • Blood Glucose Monitoring Suppl (Blood Glucose Monitor System)  w/Device kit 1 Device 4 (Four) Times a Day. 1 each 0   • calcium carbonate, oyster shell, 500 MG tablet tablet Take 1 tablet by mouth 2 (Two) Times a Day. 60 tablet 5   • gabapentin (NEURONTIN) 600 MG tablet Take 600 mg by mouth 3 (Three) Times a Day.     • glucose blood test strip 1 each by Other route 3 (Three) Times a Day. 100 each 3   • hydrOXYzine (ATARAX) 25 MG tablet Take 1 tablet by mouth Every 6 (Six) Hours As Needed (for nausea). 60 tablet 3   • icosapent ethyl (Vascepa) 1 g capsule capsule Take 2 g by mouth 2 (Two) Times a Day With Meals. 120 capsule 3   • insulin aspart (novoLOG FLEXPEN) 100 UNIT/ML solution pen-injector sc pen Inject 10 Units under the skin into the appropriate area as directed 3 (Three) Times a Day With Meals. 6 pen 3   • insulin detemir (Levemir FlexTouch) 100 UNIT/ML injection Inject 50 Units under the skin into the appropriate area as directed Daily. 15 pen 0   • Insulin Pen Needle (B-D UF III MINI PEN NEEDLES) 31G X 5 MM misc USE 4 TIMES DAILY WITH INSULIN 150 each 0   • Insulin Pen Needle 32G X 4 MM misc 1 each 4 (Four) Times a Day. 120 each 5   • Lancets Ultra Fine misc 1 Device 3 (Three) Times a Day With Meals. 100 each 2   • lisinopril-hydrochlorothiazide (PRINZIDE,ZESTORETIC) 20-25 MG per tablet Take 1 tablet by mouth Daily. 30 tablet 3   • Mapap Arthritis Pain 650 MG 8 hr tablet      • multivitamin (Tab-A-Cammie/Beta Carotene) tablet tablet Take 1 tablet by mouth Daily. 30 tablet 3   • pantoprazole (PROTONIX) 40 MG EC tablet Take 1 tablet by mouth Daily. 30 tablet 3   • rosuvastatin (CRESTOR) 40 MG tablet Take 1 tablet by mouth Every Night. 30 tablet 3   • vitamin D (ERGOCALCIFEROL) 1.25 MG (00986 UT) capsule capsule Take 1 capsule by mouth Every 7 (Seven) Days. 5 capsule 3   • benzonatate (TESSALON) 200 MG capsule Take 1 capsule by mouth 3 (Three) Times a Day As Needed for Cough. 30 capsule 0     No current facility-administered medications for this visit.       Past Medical  History:  Past Medical History:   Diagnosis Date   • Chronic pain disorder     back pain   • COPD (chronic obstructive pulmonary disease) ([facility])    • DDD (degenerative disc disease), lumbar    • Diabetes ([facility])    • Diabetic foot infection ([facility]) 12/4/2018   • Hypertension    • IBS (irritable bowel syndrome)    • Neuropathy    • Sleep apnea    • Spinal stenosis    • Stroke ([facility])    • UTI (urinary tract infection)        Past Surgical History:  Past Surgical History:   Procedure Laterality Date   • CHOLECYSTECTOMY      2004   • SUBTOTAL HYSTERECTOMY      2009   • TRANS METATARSAL AMPUTATION         Review of Systems:  Review of Systems   Constitutional: Negative.    HENT: Negative.    Eyes: Negative.    Respiratory: Negative.    Cardiovascular: Negative.    Musculoskeletal: Negative.    Skin: Negative.    Neurological: Negative.    Psychiatric/Behavioral: Negative.    All other systems reviewed and are negative.      Physical Exam:  Physical Exam  Vitals and nursing note reviewed.   Constitutional:       General: She is not in acute distress.     Appearance: Normal appearance. She is not ill-appearing.      Interventions: Face mask in place.   HENT:      Head: Normocephalic and atraumatic.      Nose: Nose normal.      Mouth/Throat:      Mouth: Mucous membranes are moist.      Pharynx: Oropharynx is clear.   Cardiovascular:      Rate and Rhythm: Normal rate and regular rhythm.   Pulmonary:      Effort: Pulmonary effort is normal. No respiratory distress.      Breath sounds: Normal breath sounds.   Abdominal:      Palpations: Abdomen is soft.   Musculoskeletal:         General: Normal range of motion.      Cervical back: Normal range of motion and neck supple.      Left Lower Extremity: Left leg is amputated above knee.   Skin:     General: Skin is warm and dry.      Findings: Wound present.      Comments: Surgical wound to left knee, clean dry and intact, no redness or swelling   Neurological:      Mental Status: She is  "alert and oriented to person, place, and time.   Psychiatric:         Mood and Affect: Mood normal.         Behavior: Behavior normal.         Vital Signs:   /58 (BP Location: Right arm, Patient Position: Sitting)   Pulse 93   Temp 97.9 °F (36.6 °C)   Ht 165.1 cm (65\")   Wt 66.2 kg (146 lb)   SpO2 99%   BMI 24.30 kg/m²      Lab Results:   Lab Requisition on 12/27/2021   Component Date Value Ref Range Status   • Glucose 12/27/2021 150* 65 - 99 mg/dL Final   • BUN 12/27/2021 15  6 - 20 mg/dL Final   • Creatinine 12/27/2021 0.84  0.57 - 1.00 mg/dL Final   • Sodium 12/27/2021 141  136 - 145 mmol/L Final   • Potassium 12/27/2021 4.7  3.5 - 5.2 mmol/L Final   • Chloride 12/27/2021 102  98 - 107 mmol/L Final   • CO2 12/27/2021 27.3  22.0 - 29.0 mmol/L Final   • Calcium 12/27/2021 8.7  8.6 - 10.5 mg/dL Final   • eGFR Non  Amer 12/27/2021 70  >60 mL/min/1.73 Final   • BUN/Creatinine Ratio 12/27/2021 17.9  7.0 - 25.0 Final   • Anion Gap 12/27/2021 11.7  5.0 - 15.0 mmol/L Final   • C-Reactive Protein 12/27/2021 1.05* 0.00 - 0.50 mg/dL Final   • WBC 12/27/2021 14.90* 3.40 - 10.80 10*3/mm3 Final   • RBC 12/27/2021 3.84  3.77 - 5.28 10*6/mm3 Final   • Hemoglobin 12/27/2021 10.0* 12.0 - 15.9 g/dL Final   • Hematocrit 12/27/2021 33.3* 34.0 - 46.6 % Final   • MCV 12/27/2021 86.7  79.0 - 97.0 fL Final   • MCH 12/27/2021 26.0* 26.6 - 33.0 pg Final   • MCHC 12/27/2021 30.0* 31.5 - 35.7 g/dL Final   • RDW 12/27/2021 18.0* 12.3 - 15.4 % Final   • RDW-SD 12/27/2021 57.5* 37.0 - 54.0 fl Final   • MPV 12/27/2021 12.1* 6.0 - 12.0 fL Final   • Platelets 12/27/2021 333  140 - 450 10*3/mm3 Final   • Neutrophil % 12/27/2021 64.1  42.7 - 76.0 % Final   • Lymphocyte % 12/27/2021 25.5  19.6 - 45.3 % Final   • Monocyte % 12/27/2021 6.0  5.0 - 12.0 % Final   • Eosinophil % 12/27/2021 3.0  0.3 - 6.2 % Final   • Basophil % 12/27/2021 0.9  0.0 - 1.5 % Final   • Immature Grans % 12/27/2021 0.5  0.0 - 0.5 % Final   • Neutrophils, " Absolute 12/27/2021 9.55* 1.70 - 7.00 10*3/mm3 Final   • Lymphocytes, Absolute 12/27/2021 3.80* 0.70 - 3.10 10*3/mm3 Final   • Monocytes, Absolute 12/27/2021 0.90  0.10 - 0.90 10*3/mm3 Final   • Eosinophils, Absolute 12/27/2021 0.45* 0.00 - 0.40 10*3/mm3 Final   • Basophils, Absolute 12/27/2021 0.13  0.00 - 0.20 10*3/mm3 Final   • Immature Grans, Absolute 12/27/2021 0.07* 0.00 - 0.05 10*3/mm3 Final   • nRBC 12/27/2021 0.0  0.0 - 0.2 /100 WBC Final   Lab Requisition on 12/21/2021   Component Date Value Ref Range Status   • Color, UA 12/21/2021 Yellow  Yellow, Straw Final   • Appearance, UA 12/21/2021 Turbid* Clear Final   • pH, UA 12/21/2021 6.0  5.0 - 8.0 Final   • Specific Gravity, UA 12/21/2021 1.016  1.005 - 1.030 Final   • Glucose, UA 12/21/2021 Negative  Negative Final   • Ketones, UA 12/21/2021 Negative  Negative Final   • Bilirubin, UA 12/21/2021 Negative  Negative Final   • Blood, UA 12/21/2021 Negative  Negative Final   • Protein, UA 12/21/2021 30 mg/dL (1+)* Negative Final   • Leuk Esterase, UA 12/21/2021 Negative  Negative Final   • Nitrite, UA 12/21/2021 Negative  Negative Final   • Urobilinogen, UA 12/21/2021 0.2 E.U./dL  0.2 - 1.0 E.U./dL Final   • RBC, UA 12/21/2021 0-2  None Seen, 0-2 /HPF Final   • WBC, UA 12/21/2021 0-2  None Seen, 0-2 /HPF Final   • Bacteria, UA 12/21/2021 Trace* None Seen /HPF Final   • Squamous Epithelial Cells, UA 12/21/2021 Too Numerous to Count* None Seen, 0-2 /HPF Final   • Hyaline Casts, UA 12/21/2021 None Seen  None Seen /LPF Final   • Methodology 12/21/2021 Manual Light Microscopy   Final   Lab Requisition on 12/20/2021   Component Date Value Ref Range Status   • Glucose 12/20/2021 115* 65 - 99 mg/dL Final   • BUN 12/20/2021 13  6 - 20 mg/dL Final   • Creatinine 12/20/2021 0.84  0.57 - 1.00 mg/dL Final   • Sodium 12/20/2021 139  136 - 145 mmol/L Final   • Potassium 12/20/2021 4.2  3.5 - 5.2 mmol/L Final   • Chloride 12/20/2021 100  98 - 107 mmol/L Final   • CO2 12/20/2021  26.8  22.0 - 29.0 mmol/L Final   • Calcium 12/20/2021 8.8  8.6 - 10.5 mg/dL Final   • eGFR Non  Amer 12/20/2021 70  >60 mL/min/1.73 Final   • BUN/Creatinine Ratio 12/20/2021 15.5  7.0 - 25.0 Final   • Anion Gap 12/20/2021 12.2  5.0 - 15.0 mmol/L Final   • C-Reactive Protein 12/20/2021 0.68* 0.00 - 0.50 mg/dL Final   • WBC 12/20/2021 17.39* 3.40 - 10.80 10*3/mm3 Final   • RBC 12/20/2021 3.90  3.77 - 5.28 10*6/mm3 Final   • Hemoglobin 12/20/2021 10.2* 12.0 - 15.9 g/dL Final   • Hematocrit 12/20/2021 33.6* 34.0 - 46.6 % Final   • MCV 12/20/2021 86.2  79.0 - 97.0 fL Final   • MCH 12/20/2021 26.2* 26.6 - 33.0 pg Final   • MCHC 12/20/2021 30.4* 31.5 - 35.7 g/dL Final   • RDW 12/20/2021 18.1* 12.3 - 15.4 % Final   • RDW-SD 12/20/2021 57.1* 37.0 - 54.0 fl Final   • MPV 12/20/2021 11.5  6.0 - 12.0 fL Final   • Platelets 12/20/2021 412  140 - 450 10*3/mm3 Final   • Neutrophil % 12/20/2021 66.7  42.7 - 76.0 % Final   • Lymphocyte % 12/20/2021 25.1  19.6 - 45.3 % Final   • Monocyte % 12/20/2021 5.8  5.0 - 12.0 % Final   • Eosinophil % 12/20/2021 1.3  0.3 - 6.2 % Final   • Basophil % 12/20/2021 0.8  0.0 - 1.5 % Final   • Immature Grans % 12/20/2021 0.3  0.0 - 0.5 % Final   • Neutrophils, Absolute 12/20/2021 11.58* 1.70 - 7.00 10*3/mm3 Final   • Lymphocytes, Absolute 12/20/2021 4.37* 0.70 - 3.10 10*3/mm3 Final   • Monocytes, Absolute 12/20/2021 1.01* 0.10 - 0.90 10*3/mm3 Final   • Eosinophils, Absolute 12/20/2021 0.23  0.00 - 0.40 10*3/mm3 Final   • Basophils, Absolute 12/20/2021 0.14  0.00 - 0.20 10*3/mm3 Final   • Immature Grans, Absolute 12/20/2021 0.06* 0.00 - 0.05 10*3/mm3 Final   • nRBC 12/20/2021 0.0  0.0 - 0.2 /100 WBC Final   Lab Requisition on 12/13/2021   Component Date Value Ref Range Status   • Glucose 12/13/2021 190* 65 - 99 mg/dL Final   • BUN 12/13/2021 24* 6 - 20 mg/dL Final   • Creatinine 12/13/2021 1.01* 0.57 - 1.00 mg/dL Final   • Sodium 12/13/2021 140  136 - 145 mmol/L Final   • Potassium 12/13/2021  4.0  3.5 - 5.2 mmol/L Final   • Chloride 12/13/2021 102  98 - 107 mmol/L Final   • CO2 12/13/2021 26.9  22.0 - 29.0 mmol/L Final   • Calcium 12/13/2021 8.8  8.6 - 10.5 mg/dL Final   • eGFR Non African Amer 12/13/2021 57* >60 mL/min/1.73 Final   • BUN/Creatinine Ratio 12/13/2021 23.8  7.0 - 25.0 Final   • Anion Gap 12/13/2021 11.1  5.0 - 15.0 mmol/L Final   • C-Reactive Protein 12/13/2021 0.50  0.00 - 0.50 mg/dL Final   • WBC 12/13/2021 14.45* 3.40 - 10.80 10*3/mm3 Final   • RBC 12/13/2021 3.56* 3.77 - 5.28 10*6/mm3 Final   • Hemoglobin 12/13/2021 9.3* 12.0 - 15.9 g/dL Final   • Hematocrit 12/13/2021 31.0* 34.0 - 46.6 % Final   • MCV 12/13/2021 87.1  79.0 - 97.0 fL Final   • MCH 12/13/2021 26.1* 26.6 - 33.0 pg Final   • MCHC 12/13/2021 30.0* 31.5 - 35.7 g/dL Final   • RDW 12/13/2021 18.5* 12.3 - 15.4 % Final   • RDW-SD 12/13/2021 59.4* 37.0 - 54.0 fl Final   • MPV 12/13/2021 11.1  6.0 - 12.0 fL Final   • Platelets 12/13/2021 539* 140 - 450 10*3/mm3 Final   • Neutrophil % 12/13/2021 62.0  42.7 - 76.0 % Final   • Lymphocyte % 12/13/2021 27.8  19.6 - 45.3 % Final   • Monocyte % 12/13/2021 6.0  5.0 - 12.0 % Final   • Eosinophil % 12/13/2021 2.6  0.3 - 6.2 % Final   • Basophil % 12/13/2021 1.0  0.0 - 1.5 % Final   • Immature Grans % 12/13/2021 0.6* 0.0 - 0.5 % Final   • Neutrophils, Absolute 12/13/2021 8.96* 1.70 - 7.00 10*3/mm3 Final   • Lymphocytes, Absolute 12/13/2021 4.02* 0.70 - 3.10 10*3/mm3 Final   • Monocytes, Absolute 12/13/2021 0.87  0.10 - 0.90 10*3/mm3 Final   • Eosinophils, Absolute 12/13/2021 0.38  0.00 - 0.40 10*3/mm3 Final   • Basophils, Absolute 12/13/2021 0.14  0.00 - 0.20 10*3/mm3 Final   • Immature Grans, Absolute 12/13/2021 0.08* 0.00 - 0.05 10*3/mm3 Final   • nRBC 12/13/2021 0.0  0.0 - 0.2 /100 WBC Final   Lab Requisition on 12/06/2021   Component Date Value Ref Range Status   • Glucose 12/06/2021 158* 65 - 99 mg/dL Final   • BUN 12/06/2021 10  6 - 20 mg/dL Final   • Creatinine 12/06/2021 0.91  0.57  - 1.00 mg/dL Final   • Sodium 12/06/2021 138  136 - 145 mmol/L Final   • Potassium 12/06/2021 4.2  3.5 - 5.2 mmol/L Final   • Chloride 12/06/2021 103  98 - 107 mmol/L Final   • CO2 12/06/2021 27.2  22.0 - 29.0 mmol/L Final   • Calcium 12/06/2021 8.1* 8.6 - 10.5 mg/dL Final   • Total Protein 12/06/2021 6.8  6.0 - 8.5 g/dL Final   • Albumin 12/06/2021 2.38* 3.50 - 5.20 g/dL Final   • ALT (SGPT) 12/06/2021 10  1 - 33 U/L Final   • AST (SGOT) 12/06/2021 15  1 - 32 U/L Final   • Alkaline Phosphatase 12/06/2021 148* 39 - 117 U/L Final   • Total Bilirubin 12/06/2021 0.2  0.0 - 1.2 mg/dL Final   • eGFR Non  Amer 12/06/2021 64  >60 mL/min/1.73 Final   • Globulin 12/06/2021 4.4  gm/dL Final   • A/G Ratio 12/06/2021 0.5  g/dL Final   • BUN/Creatinine Ratio 12/06/2021 11.0  7.0 - 25.0 Final   • Anion Gap 12/06/2021 7.8  5.0 - 15.0 mmol/L Final   • WBC 12/06/2021 12.09* 3.40 - 10.80 10*3/mm3 Final   • RBC 12/06/2021 3.34* 3.77 - 5.28 10*6/mm3 Final   • Hemoglobin 12/06/2021 8.7* 12.0 - 15.9 g/dL Final   • Hematocrit 12/06/2021 29.2* 34.0 - 46.6 % Final   • MCV 12/06/2021 87.4  79.0 - 97.0 fL Final   • MCH 12/06/2021 26.0* 26.6 - 33.0 pg Final   • MCHC 12/06/2021 29.8* 31.5 - 35.7 g/dL Final   • RDW 12/06/2021 18.2* 12.3 - 15.4 % Final   • RDW-SD 12/06/2021 57.7* 37.0 - 54.0 fl Final   • MPV 12/06/2021 10.7  6.0 - 12.0 fL Final   • Platelets 12/06/2021 488* 140 - 450 10*3/mm3 Final   • Neutrophil % 12/06/2021 53.1  42.7 - 76.0 % Final   • Lymphocyte % 12/06/2021 32.2  19.6 - 45.3 % Final   • Monocyte % 12/06/2021 7.3  5.0 - 12.0 % Final   • Eosinophil % 12/06/2021 5.8  0.3 - 6.2 % Final   • Basophil % 12/06/2021 0.9  0.0 - 1.5 % Final   • Immature Grans % 12/06/2021 0.7* 0.0 - 0.5 % Final   • Neutrophils, Absolute 12/06/2021 6.43  1.70 - 7.00 10*3/mm3 Final   • Lymphocytes, Absolute 12/06/2021 3.89* 0.70 - 3.10 10*3/mm3 Final   • Monocytes, Absolute 12/06/2021 0.88  0.10 - 0.90 10*3/mm3 Final   • Eosinophils, Absolute  12/06/2021 0.70* 0.00 - 0.40 10*3/mm3 Final   • Basophils, Absolute 12/06/2021 0.11  0.00 - 0.20 10*3/mm3 Final   • Immature Grans, Absolute 12/06/2021 0.08* 0.00 - 0.05 10*3/mm3 Final   • nRBC 12/06/2021 0.0  0.0 - 0.2 /100 WBC Final   Admission on 12/02/2021, Discharged on 12/02/2021   Component Date Value Ref Range Status   • Glucose 12/02/2021 199* 65 - 99 mg/dL Final   • BUN 12/02/2021 12  6 - 20 mg/dL Final   • Creatinine 12/02/2021 1.12* 0.57 - 1.00 mg/dL Final   • Sodium 12/02/2021 134* 136 - 145 mmol/L Final   • Potassium 12/02/2021 4.7  3.5 - 5.2 mmol/L Final   • Chloride 12/02/2021 99  98 - 107 mmol/L Final   • CO2 12/02/2021 28.8  22.0 - 29.0 mmol/L Final   • Calcium 12/02/2021 8.3* 8.6 - 10.5 mg/dL Final   • Total Protein 12/02/2021 7.5  6.0 - 8.5 g/dL Final   • Albumin 12/02/2021 2.61* 3.50 - 5.20 g/dL Final   • ALT (SGPT) 12/02/2021 6  1 - 33 U/L Final   • AST (SGOT) 12/02/2021 14  1 - 32 U/L Final   • Alkaline Phosphatase 12/02/2021 180* 39 - 117 U/L Final   • Total Bilirubin 12/02/2021 0.3  0.0 - 1.2 mg/dL Final   • eGFR Non African Amer 12/02/2021 51* >60 mL/min/1.73 Final   • Globulin 12/02/2021 4.9  gm/dL Final   • A/G Ratio 12/02/2021 0.5  g/dL Final   • BUN/Creatinine Ratio 12/02/2021 10.7  7.0 - 25.0 Final   • Anion Gap 12/02/2021 6.2  5.0 - 15.0 mmol/L Final   • THC, Screen, Urine 12/02/2021 Negative  Negative Final   • Phencyclidine (PCP), Urine 12/02/2021 Negative  Negative Final   • Cocaine Screen, Urine 12/02/2021 Negative  Negative Final   • Methamphetamine, Ur 12/02/2021 Negative  Negative Final   • Opiate Screen 12/02/2021 Negative  Negative Final   • Amphetamine Screen, Urine 12/02/2021 Negative  Negative Final   • Benzodiazepine Screen, Urine 12/02/2021 Negative  Negative Final   • Tricyclic Antidepressants Screen 12/02/2021 Negative  Negative Final   • Methadone Screen, Urine 12/02/2021 Negative  Negative Final   • Barbiturates Screen, Urine 12/02/2021 Negative  Negative Final   •  Oxycodone Screen, Urine 12/02/2021 Positive* Negative Final   • Propoxyphene Screen 12/02/2021 Negative  Negative Final   • Buprenorphine, Screen, Urine 12/02/2021 Negative  Negative Final   • Ethanol 12/02/2021 <10  0 - 10 mg/dL Final   • Ethanol % 12/02/2021 <0.010  % Final   • Acetaminophen 12/02/2021 <5.0  0.0 - 30.0 mcg/mL Final   • Salicylate 12/02/2021 1.2  <=30.0 mg/dL Final   • QT Interval 12/02/2021 366  ms Final   • QTC Interval 12/02/2021 477  ms Final   • Color, UA 12/02/2021 Dark Yellow* Yellow, Straw Final   • Appearance, UA 12/02/2021 Clear  Clear Final   • pH, UA 12/02/2021 5.5  5.0 - 8.0 Final   • Specific Gravity, UA 12/02/2021 1.014  1.005 - 1.030 Final   • Glucose, UA 12/02/2021 Negative  Negative Final   • Ketones, UA 12/02/2021 Negative  Negative Final   • Bilirubin, UA 12/02/2021 Negative  Negative Final   • Blood, UA 12/02/2021 Trace* Negative Final   • Protein, UA 12/02/2021 30 mg/dL (1+)* Negative Final   • Leuk Esterase, UA 12/02/2021 Small (1+)* Negative Final   • Nitrite, UA 12/02/2021 Negative  Negative Final   • Urobilinogen, UA 12/02/2021 0.2 E.U./dL  0.2 - 1.0 E.U./dL Final   • WBC 12/02/2021 13.00* 3.40 - 10.80 10*3/mm3 Final   • RBC 12/02/2021 3.33* 3.77 - 5.28 10*6/mm3 Final   • Hemoglobin 12/02/2021 8.5* 12.0 - 15.9 g/dL Final   • Hematocrit 12/02/2021 28.9* 34.0 - 46.6 % Final   • MCV 12/02/2021 86.8  79.0 - 97.0 fL Final   • MCH 12/02/2021 25.5* 26.6 - 33.0 pg Final   • MCHC 12/02/2021 29.4* 31.5 - 35.7 g/dL Final   • RDW 12/02/2021 18.0* 12.3 - 15.4 % Final   • RDW-SD 12/02/2021 57.4* 37.0 - 54.0 fl Final   • MPV 12/02/2021 10.1  6.0 - 12.0 fL Final   • Platelets 12/02/2021 396  140 - 450 10*3/mm3 Final   • Neutrophil % 12/02/2021 69.3  42.7 - 76.0 % Final   • Lymphocyte % 12/02/2021 20.1  19.6 - 45.3 % Final   • Monocyte % 12/02/2021 6.0  5.0 - 12.0 % Final   • Eosinophil % 12/02/2021 3.8  0.3 - 6.2 % Final   • Basophil % 12/02/2021 0.5  0.0 - 1.5 % Final   • Immature Grans  % 12/02/2021 0.3  0.0 - 0.5 % Final   • Neutrophils, Absolute 12/02/2021 9.00* 1.70 - 7.00 10*3/mm3 Final   • Lymphocytes, Absolute 12/02/2021 2.61  0.70 - 3.10 10*3/mm3 Final   • Monocytes, Absolute 12/02/2021 0.78  0.10 - 0.90 10*3/mm3 Final   • Eosinophils, Absolute 12/02/2021 0.50* 0.00 - 0.40 10*3/mm3 Final   • Basophils, Absolute 12/02/2021 0.07  0.00 - 0.20 10*3/mm3 Final   • Immature Grans, Absolute 12/02/2021 0.04  0.00 - 0.05 10*3/mm3 Final   • nRBC 12/02/2021 0.0  0.0 - 0.2 /100 WBC Final   • RBC, UA 12/02/2021 0-2  None Seen, 0-2 /HPF Final   • WBC, UA 12/02/2021 3-5* None Seen, 0-2 /HPF Final   • Bacteria, UA 12/02/2021 1+* None Seen /HPF Final   • Squamous Epithelial Cells, UA 12/02/2021 13-20* None Seen, 0-2 /HPF Final   • Yeast, UA 12/02/2021 Small/1+ Budding Yeast  None Seen /HPF Final   • Hyaline Casts, UA 12/02/2021 None Seen  None Seen /LPF Final   • Methodology 12/02/2021 Manual Light Microscopy   Final   Lab Requisition on 12/01/2021   Component Date Value Ref Range Status   • T4, Total 12/01/2021 9.16  4.50 - 11.70 mcg/dL Final   Lab Requisition on 11/29/2021   Component Date Value Ref Range Status   • Glucose 11/29/2021 184* 65 - 99 mg/dL Final   • BUN 11/29/2021 18  6 - 20 mg/dL Final   • Creatinine 11/29/2021 1.02* 0.57 - 1.00 mg/dL Final   • Sodium 11/29/2021 133* 136 - 145 mmol/L Final   • Potassium 11/29/2021 4.7  3.5 - 5.2 mmol/L Final   • Chloride 11/29/2021 96* 98 - 107 mmol/L Final   • CO2 11/29/2021 27.4  22.0 - 29.0 mmol/L Final   • Calcium 11/29/2021 8.1* 8.6 - 10.5 mg/dL Final   • Total Protein 11/29/2021 6.7  6.0 - 8.5 g/dL Final   • Albumin 11/29/2021 2.67* 3.50 - 5.20 g/dL Final   • ALT (SGPT) 11/29/2021 5  1 - 33 U/L Final   • AST (SGOT) 11/29/2021 9  1 - 32 U/L Final   • Alkaline Phosphatase 11/29/2021 186* 39 - 117 U/L Final   • Total Bilirubin 11/29/2021 0.4  0.0 - 1.2 mg/dL Final   • eGFR Non  Amer 11/29/2021 56* >60 mL/min/1.73 Final   • Globulin 11/29/2021 4.0   gm/dL Final   • A/G Ratio 11/29/2021 0.7  g/dL Final   • BUN/Creatinine Ratio 11/29/2021 17.6  7.0 - 25.0 Final   • Anion Gap 11/29/2021 9.6  5.0 - 15.0 mmol/L Final   • Total Cholesterol 11/29/2021 91  0 - 200 mg/dL Final   • Triglycerides 11/29/2021 141  0 - 150 mg/dL Final   • HDL Cholesterol 11/29/2021 28* 40 - 60 mg/dL Final   • LDL Cholesterol  11/29/2021 38  0 - 100 mg/dL Final   • VLDL Cholesterol 11/29/2021 25  5 - 40 mg/dL Final   • LDL/HDL Ratio 11/29/2021 1.24   Final   • Vitamin B-12 11/29/2021 653  211 - 946 pg/mL Final   • TSH 11/29/2021 8.370* 0.270 - 4.200 uIU/mL Final   • Folate 11/29/2021 5.58  4.78 - 24.20 ng/mL Final   • C-Reactive Protein 11/29/2021 4.14* 0.00 - 0.50 mg/dL Final   • WBC 11/29/2021 16.26* 3.40 - 10.80 10*3/mm3 Final   • RBC 11/29/2021 3.31* 3.77 - 5.28 10*6/mm3 Final   • Hemoglobin 11/29/2021 8.7* 12.0 - 15.9 g/dL Final   • Hematocrit 11/29/2021 28.5* 34.0 - 46.6 % Final   • MCV 11/29/2021 86.1  79.0 - 97.0 fL Final   • MCH 11/29/2021 26.3* 26.6 - 33.0 pg Final   • MCHC 11/29/2021 30.5* 31.5 - 35.7 g/dL Final   • RDW 11/29/2021 18.3* 12.3 - 15.4 % Final   • RDW-SD 11/29/2021 56.9* 37.0 - 54.0 fl Final   • MPV 11/29/2021 11.0  6.0 - 12.0 fL Final   • Platelets 11/29/2021 494* 140 - 450 10*3/mm3 Final   • Neutrophil % 11/29/2021 73.0  42.7 - 76.0 % Final   • Lymphocyte % 11/29/2021 19.2* 19.6 - 45.3 % Final   • Monocyte % 11/29/2021 5.4  5.0 - 12.0 % Final   • Eosinophil % 11/29/2021 1.0  0.3 - 6.2 % Final   • Basophil % 11/29/2021 0.9  0.0 - 1.5 % Final   • Immature Grans % 11/29/2021 0.5  0.0 - 0.5 % Final   • Neutrophils, Absolute 11/29/2021 11.86* 1.70 - 7.00 10*3/mm3 Final   • Lymphocytes, Absolute 11/29/2021 3.13* 0.70 - 3.10 10*3/mm3 Final   • Monocytes, Absolute 11/29/2021 0.87  0.10 - 0.90 10*3/mm3 Final   • Eosinophils, Absolute 11/29/2021 0.17  0.00 - 0.40 10*3/mm3 Final   • Basophils, Absolute 11/29/2021 0.15  0.00 - 0.20 10*3/mm3 Final   • Immature Grans, Absolute  11/29/2021 0.08* 0.00 - 0.05 10*3/mm3 Final   • nRBC 11/29/2021 0.0  0.0 - 0.2 /100 WBC Final   Lab Requisition on 11/26/2021   Component Date Value Ref Range Status   • Glucose 11/26/2021 284* 65 - 99 mg/dL Final   • BUN 11/26/2021 20  6 - 20 mg/dL Final   • Creatinine 11/26/2021 1.17* 0.57 - 1.00 mg/dL Final   • Sodium 11/26/2021 137  136 - 145 mmol/L Final   • Potassium 11/26/2021 5.3* 3.5 - 5.2 mmol/L Final   • Chloride 11/26/2021 96* 98 - 107 mmol/L Final   • CO2 11/26/2021 33.8* 22.0 - 29.0 mmol/L Final   • Calcium 11/26/2021 8.4* 8.6 - 10.5 mg/dL Final   • Total Protein 11/26/2021 6.6  6.0 - 8.5 g/dL Final   • Albumin 11/26/2021 2.56* 3.50 - 5.20 g/dL Final   • ALT (SGPT) 11/26/2021 7  1 - 33 U/L Final   • AST (SGOT) 11/26/2021 11  1 - 32 U/L Final   • Alkaline Phosphatase 11/26/2021 255* 39 - 117 U/L Final   • Total Bilirubin 11/26/2021 0.3  0.0 - 1.2 mg/dL Final   • eGFR Non  Amer 11/26/2021 48* >60 mL/min/1.73 Final   • Globulin 11/26/2021 4.0  gm/dL Final   • A/G Ratio 11/26/2021 0.6  g/dL Final   • BUN/Creatinine Ratio 11/26/2021 17.1  7.0 - 25.0 Final   • Anion Gap 11/26/2021 7.2  5.0 - 15.0 mmol/L Final   • Hemoglobin A1C 11/26/2021 9.70* 4.80 - 5.60 % Final   • WBC 11/26/2021 15.22* 3.40 - 10.80 10*3/mm3 Final   • RBC 11/26/2021 3.31* 3.77 - 5.28 10*6/mm3 Final   • Hemoglobin 11/26/2021 8.4* 12.0 - 15.9 g/dL Final   • Hematocrit 11/26/2021 28.9* 34.0 - 46.6 % Final   • MCV 11/26/2021 87.3  79.0 - 97.0 fL Final   • MCH 11/26/2021 25.4* 26.6 - 33.0 pg Final   • MCHC 11/26/2021 29.1* 31.5 - 35.7 g/dL Final   • RDW 11/26/2021 17.2* 12.3 - 15.4 % Final   • RDW-SD 11/26/2021 54.4* 37.0 - 54.0 fl Final   • MPV 11/26/2021 11.2  6.0 - 12.0 fL Final   • Platelets 11/26/2021 578* 140 - 450 10*3/mm3 Final   • Neutrophil % 11/26/2021 65.8  42.7 - 76.0 % Final   • Lymphocyte % 11/26/2021 24.2  19.6 - 45.3 % Final   • Monocyte % 11/26/2021 7.4  5.0 - 12.0 % Final   • Eosinophil % 11/26/2021 1.3  0.3 - 6.2 %  Final   • Basophil % 11/26/2021 0.8  0.0 - 1.5 % Final   • Immature Grans % 11/26/2021 0.5  0.0 - 0.5 % Final   • Neutrophils, Absolute 11/26/2021 10.01* 1.70 - 7.00 10*3/mm3 Final   • Lymphocytes, Absolute 11/26/2021 3.69* 0.70 - 3.10 10*3/mm3 Final   • Monocytes, Absolute 11/26/2021 1.13* 0.10 - 0.90 10*3/mm3 Final   • Eosinophils, Absolute 11/26/2021 0.20  0.00 - 0.40 10*3/mm3 Final   • Basophils, Absolute 11/26/2021 0.12  0.00 - 0.20 10*3/mm3 Final   • Immature Grans, Absolute 11/26/2021 0.07* 0.00 - 0.05 10*3/mm3 Final   • nRBC 11/26/2021 0.0  0.0 - 0.2 /100 WBC Final       EKG Results:  No orders to display       Imaging Results:  CT Head Without Contrast    Result Date: 12/2/2021  No acute intracranial pathology. Nothing is seen on this exam to specifically account for the patient's symptoms.  This report was finalized on 12/2/2021 2:50 PM by Dr. Vick Tamayo MD.      XR Chest 1 View    Result Date: 12/2/2021  Diffuse airspace disease in the left lung  This report was finalized on 12/2/2021 1:22 PM by Dr. Vick Tamayo MD.                  Assessment and Plan   Diagnoses and all orders for this visit:    1. History of above knee amputation, left (HCC) (Primary)    2. Type 2 diabetes mellitus with peripheral neuropathy (HCC)  Comments:  Stressed importance of continuing glycemic control  Orders:  -     Blood Glucose Monitoring Suppl (Blood Glucose Monitor System) w/Device kit; 1 Device 4 (Four) Times a Day.  Dispense: 1 each; Refill: 0  -     insulin aspart (novoLOG FLEXPEN) 100 UNIT/ML solution pen-injector sc pen; Inject 10 Units under the skin into the appropriate area as directed 3 (Three) Times a Day With Meals.  Dispense: 6 pen; Refill: 3  -     insulin detemir (Levemir FlexTouch) 100 UNIT/ML injection; Inject 50 Units under the skin into the appropriate area as directed Daily.  Dispense: 15 pen; Refill: 0    3. Mixed hyperlipidemia  Comments:  Cardiovascular risk reduction modifications reinforced.    Orders:  -     icosapent ethyl (Vascepa) 1 g capsule capsule; Take 2 g by mouth 2 (Two) Times a Day With Meals.  Dispense: 120 capsule; Refill: 3  -     rosuvastatin (CRESTOR) 40 MG tablet; Take 1 tablet by mouth Every Night.  Dispense: 30 tablet; Refill: 3    4. Type 2 diabetes mellitus with peripheral neuropathy (HCC)  Comments:  Reviewed recent lab results which included an A1C of over 15. Treatment options reviewed.   Orders:  -     Blood Glucose Monitoring Suppl (Blood Glucose Monitor System) w/Device kit; 1 Device 4 (Four) Times a Day.  Dispense: 1 each; Refill: 0  -     insulin aspart (novoLOG FLEXPEN) 100 UNIT/ML solution pen-injector sc pen; Inject 10 Units under the skin into the appropriate area as directed 3 (Three) Times a Day With Meals.  Dispense: 6 pen; Refill: 3  -     insulin detemir (Levemir FlexTouch) 100 UNIT/ML injection; Inject 50 Units under the skin into the appropriate area as directed Daily.  Dispense: 15 pen; Refill: 0    5. Essential hypertension  Comments:  Adequately controlled  Orders:  -     lisinopril-hydrochlorothiazide (PRINZIDE,ZESTORETIC) 20-25 MG per tablet; Take 1 tablet by mouth Daily.  Dispense: 30 tablet; Refill: 3    6. Gastroesophageal reflux disease without esophagitis  -     pantoprazole (PROTONIX) 40 MG EC tablet; Take 1 tablet by mouth Daily.  Dispense: 30 tablet; Refill: 3    7. Vitamin D deficiency  Comments:  Continue Vit D weekly   Orders:  -     vitamin D (ERGOCALCIFEROL) 1.25 MG (64165 UT) capsule capsule; Take 1 capsule by mouth Every 7 (Seven) Days.  Dispense: 5 capsule; Refill: 3    8. Current smoker  Comments:  smoking cessation encouraged, patient declines assistance with quitting at this time    Other orders  -     calcium carbonate, oyster shell, 500 MG tablet tablet; Take 1 tablet by mouth 2 (Two) Times a Day.  Dispense: 60 tablet; Refill: 5  -     multivitamin (Tab-A-Cammie/Beta Carotene) tablet tablet; Take 1 tablet by mouth Daily.  Dispense: 30  tablet; Refill: 3  -     hydrOXYzine (ATARAX) 25 MG tablet; Take 1 tablet by mouth Every 6 (Six) Hours As Needed (for nausea).  Dispense: 60 tablet; Refill: 3  -     FluLaval/Fluarix/Fluzone >6 Months ([id number])        Meds ordered during this visit:  New Medications Ordered This Visit   Medications   • Blood Glucose Monitoring Suppl (Blood Glucose Monitor System) w/Device kit     Si Device 4 (Four) Times a Day.     Dispense:  1 each     Refill:  0   • calcium carbonate, oyster shell, 500 MG tablet tablet     Sig: Take 1 tablet by mouth 2 (Two) Times a Day.     Dispense:  60 tablet     Refill:  5   • icosapent ethyl (Vascepa) 1 g capsule capsule     Sig: Take 2 g by mouth 2 (Two) Times a Day With Meals.     Dispense:  120 capsule     Refill:  3   • insulin aspart (novoLOG FLEXPEN) 100 UNIT/ML solution pen-injector sc pen     Sig: Inject 10 Units under the skin into the appropriate area as directed 3 (Three) Times a Day With Meals.     Dispense:  6 pen     Refill:  3     Order Specific Question:   Quantity     Answer:   6   • insulin detemir (Levemir FlexTouch) 100 UNIT/ML injection     Sig: Inject 50 Units under the skin into the appropriate area as directed Daily.     Dispense:  15 pen     Refill:  0   • lisinopril-hydrochlorothiazide (PRINZIDE,ZESTORETIC) 20-25 MG per tablet     Sig: Take 1 tablet by mouth Daily.     Dispense:  30 tablet     Refill:  3   • multivitamin (Tab-A-Cammie/Beta Carotene) tablet tablet     Sig: Take 1 tablet by mouth Daily.     Dispense:  30 tablet     Refill:  3   • hydrOXYzine (ATARAX) 25 MG tablet     Sig: Take 1 tablet by mouth Every 6 (Six) Hours As Needed (for nausea).     Dispense:  60 tablet     Refill:  3   • pantoprazole (PROTONIX) 40 MG EC tablet     Sig: Take 1 tablet by mouth Daily.     Dispense:  30 tablet     Refill:  3   • rosuvastatin (CRESTOR) 40 MG tablet     Sig: Take 1 tablet by mouth Every Night.     Dispense:  30 tablet     Refill:  3   • vitamin D  (ERGOCALCIFEROL) 1.25 MG (12963 UT) capsule capsule     Sig: Take 1 capsule by mouth Every 7 (Seven) Days.     Dispense:  5 capsule     Refill:  3       Patient Instructions:  Patient instructions given for the following visit diagnosis:    ICD-10-CM ICD-9-CM   1. History of above knee amputation, left (HCC)  Z89.612 V49.76   2. Type 2 diabetes mellitus with peripheral neuropathy (HCC)  E11.42 250.60     357.2   3. Mixed hyperlipidemia  E78.2 272.2   4. Type 2 diabetes mellitus with peripheral neuropathy (HCC)  E11.42 250.60     357.2   5. Essential hypertension  I10 401.9   6. Gastroesophageal reflux disease without esophagitis  K21.9 530.81   7. Vitamin D deficiency  E55.9 268.9   8. Current smoker  F17.200 305.1       Follow Up   Return in about 3 months (around 4/13/2022).        This document has been electronically signed by BARBI Ward  January 17, 2022 09:57 EST    Patient was given instructions and counseling regarding her condition or for health maintenance advice. Please see specific information pulled into the AVS if appropriate.     Part of this note may be an electronic transcription/translation of spoken language to printed text using the Dragon Dictation System.

## 2022-01-25 ENCOUNTER — PATIENT OUTREACH (OUTPATIENT)
Dept: CASE MANAGEMENT | Facility: OTHER | Age: 57
End: 2022-01-25

## 2022-01-25 NOTE — OUTREACH NOTE
Ambulatory Case Management Note    Care Coordination    Patient on list for proactive outreach to address diabetes management; per chart review patient is noted recent stay at [facility] for rehab, has since dc'd to home.         Ivelisse Quinn RN  Ambulatory Case Management    1/25/2022, 14:14 EST

## 2022-01-26 ENCOUNTER — OFFICE VISIT (OUTPATIENT)
Dept: FAMILY MEDICINE CLINIC | Facility: CLINIC | Age: 57
End: 2022-01-26

## 2022-01-26 ENCOUNTER — TELEPHONE (OUTPATIENT)
Dept: FAMILY MEDICINE CLINIC | Facility: CLINIC | Age: 57
End: 2022-01-26

## 2022-01-26 VITALS
HEART RATE: 84 BPM | TEMPERATURE: 97.3 F | SYSTOLIC BLOOD PRESSURE: 116 MMHG | WEIGHT: 146 LBS | DIASTOLIC BLOOD PRESSURE: 58 MMHG | HEIGHT: 65 IN | OXYGEN SATURATION: 98 % | BODY MASS INDEX: 24.32 KG/M2

## 2022-01-26 DIAGNOSIS — R30.0 DYSURIA: ICD-10-CM

## 2022-01-26 DIAGNOSIS — Z89.612 LEFT ABOVE-KNEE AMPUTEE: Primary | Chronic | ICD-10-CM

## 2022-01-26 DIAGNOSIS — Z89.612 LEFT ABOVE-KNEE AMPUTEE: Primary | ICD-10-CM

## 2022-01-26 DIAGNOSIS — E78.2 MIXED HYPERLIPIDEMIA: Chronic | ICD-10-CM

## 2022-01-26 DIAGNOSIS — N39.3 STRESS INCONTINENCE OF URINE: Chronic | ICD-10-CM

## 2022-01-26 DIAGNOSIS — E11.42 TYPE 2 DIABETES MELLITUS WITH PERIPHERAL NEUROPATHY: Chronic | ICD-10-CM

## 2022-01-26 PROCEDURE — 99214 OFFICE O/P EST MOD 30 MIN: CPT | Performed by: NURSE PRACTITIONER

## 2022-01-26 RX ORDER — DIAPER,BRIEF,ADULT, DISPOSABLE
1 EACH MISCELLANEOUS 3 TIMES DAILY PRN
Qty: 90 EACH | Refills: 2 | Status: SHIPPED | OUTPATIENT
Start: 2022-01-26 | End: 2022-09-22 | Stop reason: SDUPTHER

## 2022-01-26 RX ORDER — ATORVASTATIN CALCIUM 80 MG/1
TABLET, FILM COATED ORAL
COMMUNITY
Start: 2022-01-03 | End: 2022-01-26

## 2022-01-26 RX ORDER — POTASSIUM CHLORIDE 750 MG/1
10 TABLET, EXTENDED RELEASE ORAL DAILY
Qty: 30 TABLET | Refills: 5 | Status: SHIPPED | OUTPATIENT
Start: 2022-01-26 | End: 2022-07-22 | Stop reason: SDUPTHER

## 2022-01-26 RX ORDER — NITROFURANTOIN 25; 75 MG/1; MG/1
100 CAPSULE ORAL 2 TIMES DAILY
Qty: 14 CAPSULE | Refills: 0 | Status: SHIPPED | OUTPATIENT
Start: 2022-01-26 | End: 2022-02-02

## 2022-01-26 RX ORDER — GABAPENTIN 300 MG/1
CAPSULE ORAL
COMMUNITY
Start: 2022-01-07 | End: 2022-01-26

## 2022-01-26 NOTE — PROGRESS NOTES
"Chief Complaint  Difficulty Urinating and Follow-up    Subjective          Jocelyn Palomo is a 56 y.o. female who presents today to [facility] for dysuria and follow up on prosthetic.    HPI:   Left Above Knee Amputee- Patient would like to initiate the process for getting a prosthetic for left leg. She is currently participating in physical therapy and states they have requested she also get an order for a  for the left lower extremity in preparation for prosthetic. She states at home physical therapy is going welll. Currently using a wheelchair for mobility but plans on walking again once she gets a prosthesis. She has PMH of type 2 DM which was poorly controlled thus causing necrosis of the left lower extremity requiring emergency surgery and amputation in November of 2021. Patient states she is monitoring her blood sugars at home more closely now. BG ranges from 150-250 when she checks it. She is adhering to a consistent carb diet, only drinking sugar free and \"zero\" sodas. Last hgb a1c 9.7 on 11/26/2021.    Difficulty Urinating  This is a new problem. The current episode started in the past 7 days. The problem occurs daily. The problem has been unchanged. Associated symptoms include abdominal pain. Pertinent negatives include no chills, fever, nausea or vomiting. Nothing aggravates the symptoms. She has tried nothing for the symptoms. The treatment provided no relief.     Patient would also like to have a prescription for depends, size large for occasional urinary incontinence.   Objective     Problem List:  Patient Active Problem List   Diagnosis   • Recurrent major depressive disorder, in partial remission (HCC)   • Gastroesophageal reflux disease without esophagitis   • Chronic pain syndrome   • Type 2 diabetes mellitus with peripheral neuropathy (HCC)   • Mixed hyperlipidemia   • Essential hypertension   • Current smoker   • Encounter for immunization   • Healthcare " maintenance   • Renal insufficiency   • History of transmetatarsal amputation of left foot (HCC)       Allergy:   Allergies   Allergen Reactions   • Morphine Nausea And Vomiting        Discontinued Medications:  Medications Discontinued During This Encounter   Medication Reason   • atorvastatin (LIPITOR) 80 MG tablet *Therapy completed   • benzonatate (TESSALON) 200 MG capsule *Therapy completed   • gabapentin (NEURONTIN) 300 MG capsule *Therapy completed       Current Medications:   Current Outpatient Medications   Medication Sig Dispense Refill   • acetaminophen (TYLENOL) 325 MG tablet Take 650 mg by mouth As Needed for Mild Pain .     • albuterol (PROVENTIL,VENTOLIN) 2 MG/5ML syrup Take 5-10 mL by mouth Every 6 (Six) Hours As Needed (cough). 60 mL 0   • Blood Glucose Monitoring Suppl (Blood Glucose Monitor System) w/Device kit 1 Device 4 (Four) Times a Day. 1 each 0   • calcium carbonate, oyster shell, 500 MG tablet tablet Take 1 tablet by mouth 2 (Two) Times a Day. 60 tablet 5   • gabapentin (NEURONTIN) 600 MG tablet Take 600 mg by mouth 3 (Three) Times a Day.     • glucose blood test strip 1 each by Other route 3 (Three) Times a Day. 100 each 3   • hydrOXYzine (ATARAX) 25 MG tablet Take 1 tablet by mouth Every 6 (Six) Hours As Needed (for nausea). 60 tablet 3   • icosapent ethyl (Vascepa) 1 g capsule capsule Take 2 g by mouth 2 (Two) Times a Day With Meals. 120 capsule 3   • insulin aspart (novoLOG FLEXPEN) 100 UNIT/ML solution pen-injector sc pen Inject 10 Units under the skin into the appropriate area as directed 3 (Three) Times a Day With Meals. 6 pen 3   • insulin detemir (Levemir FlexTouch) 100 UNIT/ML injection Inject 50 Units under the skin into the appropriate area as directed Daily. 15 pen 0   • Insulin Pen Needle (B-D UF III MINI PEN NEEDLES) 31G X 5 MM misc USE 4 TIMES DAILY WITH INSULIN 150 each 0   • Insulin Pen Needle 32G X 4 MM misc 1 each 4 (Four) Times a Day. 120 each 5   • Lancets Ultra Fine  misc 1 Device 3 (Three) Times a Day With Meals. 100 each 2   • lisinopril-hydrochlorothiazide (PRINZIDE,ZESTORETIC) 20-25 MG per tablet Take 1 tablet by mouth Daily. 30 tablet 3   • Mapap Arthritis Pain 650 MG 8 hr tablet      • multivitamin (Tab-A-Cammie/Beta Carotene) tablet tablet Take 1 tablet by mouth Daily. 30 tablet 3   • pantoprazole (PROTONIX) 40 MG EC tablet Take 1 tablet by mouth Daily. 30 tablet 3   • rosuvastatin (CRESTOR) 40 MG tablet Take 1 tablet by mouth Every Night. 30 tablet 3   • vitamin D (ERGOCALCIFEROL) 1.25 MG (37538 UT) capsule capsule Take 1 capsule by mouth Every 7 (Seven) Days. 5 capsule 3   • Incontinence Supply Disposable (Depend Underwear Large) misc 1 each 3 (Three) Times a Day As Needed (for urinary incontinence). 90 each 2   • nitrofurantoin, macrocrystal-monohydrate, (Macrobid) 100 MG capsule Take 1 capsule by mouth 2 (Two) Times a Day for 7 days. 14 capsule 0   • potassium chloride (K-DUR,KLOR-CON) 10 MEQ CR tablet Take 1 tablet by mouth Daily. 30 tablet 5     No current facility-administered medications for this visit.       Past Medical History:  Past Medical History:   Diagnosis Date   • Chronic pain disorder     back pain   • COPD (chronic obstructive pulmonary disease) ([facility])    • DDD (degenerative disc disease), lumbar    • Diabetes ([facility])    • Diabetic foot infection ([facility]) 12/4/2018   • Hypertension    • IBS (irritable bowel syndrome)    • Neuropathy    • Sleep apnea    • Spinal stenosis    • Stroke ([facility])    • UTI (urinary tract infection)        Past Surgical History:  Past Surgical History:   Procedure Laterality Date   • CHOLECYSTECTOMY      2004   • SUBTOTAL HYSTERECTOMY      2009   • TRANS METATARSAL AMPUTATION         Review of Systems:  Review of Systems   Constitutional: Negative for chills and fever.   HENT: Negative.    Respiratory: Negative.    Gastrointestinal: Positive for abdominal pain. Negative for nausea and vomiting.   Genitourinary: Positive for difficulty  "urinating.   Musculoskeletal: Negative.    Skin: Negative.    Neurological: Negative.    All other systems reviewed and are negative.      Physical Exam:  Physical Exam  Vitals and nursing note reviewed.   Constitutional:       General: She is not in acute distress.     Appearance: Normal appearance. She is not ill-appearing.      Interventions: Face mask in place.   HENT:      Head: Normocephalic and atraumatic.   Cardiovascular:      Rate and Rhythm: Normal rate and regular rhythm.   Pulmonary:      Effort: Pulmonary effort is normal. No respiratory distress.      Breath sounds: Normal breath sounds.   Abdominal:      Palpations: Abdomen is soft.   Musculoskeletal:         General: Normal range of motion.      Cervical back: Normal range of motion and neck supple.      Left Lower Extremity: Left leg is amputated above knee.   Skin:     General: Skin is warm and dry.   Neurological:      Mental Status: She is alert and oriented to person, place, and time.   Psychiatric:         Mood and Affect: Mood normal.         Behavior: Behavior normal.         Vital Signs:   /58   Pulse 84   Temp 97.3 °F (36.3 °C) (Temporal)   Ht 165.1 cm (65\")   Wt 66.2 kg (146 lb)   SpO2 98%   BMI 24.30 kg/m²      Lab Results:   Lab Requisition on 12/27/2021   Component Date Value Ref Range Status   • Glucose 12/27/2021 150* 65 - 99 mg/dL Final   • BUN 12/27/2021 15  6 - 20 mg/dL Final   • Creatinine 12/27/2021 0.84  0.57 - 1.00 mg/dL Final   • Sodium 12/27/2021 141  136 - 145 mmol/L Final   • Potassium 12/27/2021 4.7  3.5 - 5.2 mmol/L Final   • Chloride 12/27/2021 102  98 - 107 mmol/L Final   • CO2 12/27/2021 27.3  22.0 - 29.0 mmol/L Final   • Calcium 12/27/2021 8.7  8.6 - 10.5 mg/dL Final   • eGFR Non  Amer 12/27/2021 70  >60 mL/min/1.73 Final   • BUN/Creatinine Ratio 12/27/2021 17.9  7.0 - 25.0 Final   • Anion Gap 12/27/2021 11.7  5.0 - 15.0 mmol/L Final   • C-Reactive Protein 12/27/2021 1.05* 0.00 - 0.50 mg/dL Final "   • WBC 12/27/2021 14.90* 3.40 - 10.80 10*3/mm3 Final   • RBC 12/27/2021 3.84  3.77 - 5.28 10*6/mm3 Final   • Hemoglobin 12/27/2021 10.0* 12.0 - 15.9 g/dL Final   • Hematocrit 12/27/2021 33.3* 34.0 - 46.6 % Final   • MCV 12/27/2021 86.7  79.0 - 97.0 fL Final   • MCH 12/27/2021 26.0* 26.6 - 33.0 pg Final   • MCHC 12/27/2021 30.0* 31.5 - 35.7 g/dL Final   • RDW 12/27/2021 18.0* 12.3 - 15.4 % Final   • RDW-SD 12/27/2021 57.5* 37.0 - 54.0 fl Final   • MPV 12/27/2021 12.1* 6.0 - 12.0 fL Final   • Platelets 12/27/2021 333  140 - 450 10*3/mm3 Final   • Neutrophil % 12/27/2021 64.1  42.7 - 76.0 % Final   • Lymphocyte % 12/27/2021 25.5  19.6 - 45.3 % Final   • Monocyte % 12/27/2021 6.0  5.0 - 12.0 % Final   • Eosinophil % 12/27/2021 3.0  0.3 - 6.2 % Final   • Basophil % 12/27/2021 0.9  0.0 - 1.5 % Final   • Immature Grans % 12/27/2021 0.5  0.0 - 0.5 % Final   • Neutrophils, Absolute 12/27/2021 9.55* 1.70 - 7.00 10*3/mm3 Final   • Lymphocytes, Absolute 12/27/2021 3.80* 0.70 - 3.10 10*3/mm3 Final   • Monocytes, Absolute 12/27/2021 0.90  0.10 - 0.90 10*3/mm3 Final   • Eosinophils, Absolute 12/27/2021 0.45* 0.00 - 0.40 10*3/mm3 Final   • Basophils, Absolute 12/27/2021 0.13  0.00 - 0.20 10*3/mm3 Final   • Immature Grans, Absolute 12/27/2021 0.07* 0.00 - 0.05 10*3/mm3 Final   • nRBC 12/27/2021 0.0  0.0 - 0.2 /100 WBC Final   Lab Requisition on 12/21/2021   Component Date Value Ref Range Status   • Color, UA 12/21/2021 Yellow  Yellow, Straw Final   • Appearance, UA 12/21/2021 Turbid* Clear Final   • pH, UA 12/21/2021 6.0  5.0 - 8.0 Final   • Specific Gravity, UA 12/21/2021 1.016  1.005 - 1.030 Final   • Glucose, UA 12/21/2021 Negative  Negative Final   • Ketones, UA 12/21/2021 Negative  Negative Final   • Bilirubin, UA 12/21/2021 Negative  Negative Final   • Blood, UA 12/21/2021 Negative  Negative Final   • Protein, UA 12/21/2021 30 mg/dL (1+)* Negative Final   • Leuk Esterase, UA 12/21/2021 Negative  Negative Final   • Nitrite, UA  12/21/2021 Negative  Negative Final   • Urobilinogen, UA 12/21/2021 0.2 E.U./dL  0.2 - 1.0 E.U./dL Final   • RBC, UA 12/21/2021 0-2  None Seen, 0-2 /HPF Final   • WBC, UA 12/21/2021 0-2  None Seen, 0-2 /HPF Final   • Bacteria, UA 12/21/2021 Trace* None Seen /HPF Final   • Squamous Epithelial Cells, UA 12/21/2021 Too Numerous to Count* None Seen, 0-2 /HPF Final   • Hyaline Casts, UA 12/21/2021 None Seen  None Seen /LPF Final   • Methodology 12/21/2021 Manual Light Microscopy   Final   Lab Requisition on 12/20/2021   Component Date Value Ref Range Status   • Glucose 12/20/2021 115* 65 - 99 mg/dL Final   • BUN 12/20/2021 13  6 - 20 mg/dL Final   • Creatinine 12/20/2021 0.84  0.57 - 1.00 mg/dL Final   • Sodium 12/20/2021 139  136 - 145 mmol/L Final   • Potassium 12/20/2021 4.2  3.5 - 5.2 mmol/L Final   • Chloride 12/20/2021 100  98 - 107 mmol/L Final   • CO2 12/20/2021 26.8  22.0 - 29.0 mmol/L Final   • Calcium 12/20/2021 8.8  8.6 - 10.5 mg/dL Final   • eGFR Non  Amer 12/20/2021 70  >60 mL/min/1.73 Final   • BUN/Creatinine Ratio 12/20/2021 15.5  7.0 - 25.0 Final   • Anion Gap 12/20/2021 12.2  5.0 - 15.0 mmol/L Final   • C-Reactive Protein 12/20/2021 0.68* 0.00 - 0.50 mg/dL Final   • WBC 12/20/2021 17.39* 3.40 - 10.80 10*3/mm3 Final   • RBC 12/20/2021 3.90  3.77 - 5.28 10*6/mm3 Final   • Hemoglobin 12/20/2021 10.2* 12.0 - 15.9 g/dL Final   • Hematocrit 12/20/2021 33.6* 34.0 - 46.6 % Final   • MCV 12/20/2021 86.2  79.0 - 97.0 fL Final   • MCH 12/20/2021 26.2* 26.6 - 33.0 pg Final   • MCHC 12/20/2021 30.4* 31.5 - 35.7 g/dL Final   • RDW 12/20/2021 18.1* 12.3 - 15.4 % Final   • RDW-SD 12/20/2021 57.1* 37.0 - 54.0 fl Final   • MPV 12/20/2021 11.5  6.0 - 12.0 fL Final   • Platelets 12/20/2021 412  140 - 450 10*3/mm3 Final   • Neutrophil % 12/20/2021 66.7  42.7 - 76.0 % Final   • Lymphocyte % 12/20/2021 25.1  19.6 - 45.3 % Final   • Monocyte % 12/20/2021 5.8  5.0 - 12.0 % Final   • Eosinophil % 12/20/2021 1.3  0.3 - 6.2  % Final   • Basophil % 12/20/2021 0.8  0.0 - 1.5 % Final   • Immature Grans % 12/20/2021 0.3  0.0 - 0.5 % Final   • Neutrophils, Absolute 12/20/2021 11.58* 1.70 - 7.00 10*3/mm3 Final   • Lymphocytes, Absolute 12/20/2021 4.37* 0.70 - 3.10 10*3/mm3 Final   • Monocytes, Absolute 12/20/2021 1.01* 0.10 - 0.90 10*3/mm3 Final   • Eosinophils, Absolute 12/20/2021 0.23  0.00 - 0.40 10*3/mm3 Final   • Basophils, Absolute 12/20/2021 0.14  0.00 - 0.20 10*3/mm3 Final   • Immature Grans, Absolute 12/20/2021 0.06* 0.00 - 0.05 10*3/mm3 Final   • nRBC 12/20/2021 0.0  0.0 - 0.2 /100 WBC Final   Lab Requisition on 12/13/2021   Component Date Value Ref Range Status   • Glucose 12/13/2021 190* 65 - 99 mg/dL Final   • BUN 12/13/2021 24* 6 - 20 mg/dL Final   • Creatinine 12/13/2021 1.01* 0.57 - 1.00 mg/dL Final   • Sodium 12/13/2021 140  136 - 145 mmol/L Final   • Potassium 12/13/2021 4.0  3.5 - 5.2 mmol/L Final   • Chloride 12/13/2021 102  98 - 107 mmol/L Final   • CO2 12/13/2021 26.9  22.0 - 29.0 mmol/L Final   • Calcium 12/13/2021 8.8  8.6 - 10.5 mg/dL Final   • eGFR Non African Amer 12/13/2021 57* >60 mL/min/1.73 Final   • BUN/Creatinine Ratio 12/13/2021 23.8  7.0 - 25.0 Final   • Anion Gap 12/13/2021 11.1  5.0 - 15.0 mmol/L Final   • C-Reactive Protein 12/13/2021 0.50  0.00 - 0.50 mg/dL Final   • WBC 12/13/2021 14.45* 3.40 - 10.80 10*3/mm3 Final   • RBC 12/13/2021 3.56* 3.77 - 5.28 10*6/mm3 Final   • Hemoglobin 12/13/2021 9.3* 12.0 - 15.9 g/dL Final   • Hematocrit 12/13/2021 31.0* 34.0 - 46.6 % Final   • MCV 12/13/2021 87.1  79.0 - 97.0 fL Final   • MCH 12/13/2021 26.1* 26.6 - 33.0 pg Final   • MCHC 12/13/2021 30.0* 31.5 - 35.7 g/dL Final   • RDW 12/13/2021 18.5* 12.3 - 15.4 % Final   • RDW-SD 12/13/2021 59.4* 37.0 - 54.0 fl Final   • MPV 12/13/2021 11.1  6.0 - 12.0 fL Final   • Platelets 12/13/2021 539* 140 - 450 10*3/mm3 Final   • Neutrophil % 12/13/2021 62.0  42.7 - 76.0 % Final   • Lymphocyte % 12/13/2021 27.8  19.6 - 45.3 %  Final   • Monocyte % 12/13/2021 6.0  5.0 - 12.0 % Final   • Eosinophil % 12/13/2021 2.6  0.3 - 6.2 % Final   • Basophil % 12/13/2021 1.0  0.0 - 1.5 % Final   • Immature Grans % 12/13/2021 0.6* 0.0 - 0.5 % Final   • Neutrophils, Absolute 12/13/2021 8.96* 1.70 - 7.00 10*3/mm3 Final   • Lymphocytes, Absolute 12/13/2021 4.02* 0.70 - 3.10 10*3/mm3 Final   • Monocytes, Absolute 12/13/2021 0.87  0.10 - 0.90 10*3/mm3 Final   • Eosinophils, Absolute 12/13/2021 0.38  0.00 - 0.40 10*3/mm3 Final   • Basophils, Absolute 12/13/2021 0.14  0.00 - 0.20 10*3/mm3 Final   • Immature Grans, Absolute 12/13/2021 0.08* 0.00 - 0.05 10*3/mm3 Final   • nRBC 12/13/2021 0.0  0.0 - 0.2 /100 WBC Final   Lab Requisition on 12/06/2021   Component Date Value Ref Range Status   • Glucose 12/06/2021 158* 65 - 99 mg/dL Final   • BUN 12/06/2021 10  6 - 20 mg/dL Final   • Creatinine 12/06/2021 0.91  0.57 - 1.00 mg/dL Final   • Sodium 12/06/2021 138  136 - 145 mmol/L Final   • Potassium 12/06/2021 4.2  3.5 - 5.2 mmol/L Final   • Chloride 12/06/2021 103  98 - 107 mmol/L Final   • CO2 12/06/2021 27.2  22.0 - 29.0 mmol/L Final   • Calcium 12/06/2021 8.1* 8.6 - 10.5 mg/dL Final   • Total Protein 12/06/2021 6.8  6.0 - 8.5 g/dL Final   • Albumin 12/06/2021 2.38* 3.50 - 5.20 g/dL Final   • ALT (SGPT) 12/06/2021 10  1 - 33 U/L Final   • AST (SGOT) 12/06/2021 15  1 - 32 U/L Final   • Alkaline Phosphatase 12/06/2021 148* 39 - 117 U/L Final   • Total Bilirubin 12/06/2021 0.2  0.0 - 1.2 mg/dL Final   • eGFR Non  Amer 12/06/2021 64  >60 mL/min/1.73 Final   • Globulin 12/06/2021 4.4  gm/dL Final   • A/G Ratio 12/06/2021 0.5  g/dL Final   • BUN/Creatinine Ratio 12/06/2021 11.0  7.0 - 25.0 Final   • Anion Gap 12/06/2021 7.8  5.0 - 15.0 mmol/L Final   • WBC 12/06/2021 12.09* 3.40 - 10.80 10*3/mm3 Final   • RBC 12/06/2021 3.34* 3.77 - 5.28 10*6/mm3 Final   • Hemoglobin 12/06/2021 8.7* 12.0 - 15.9 g/dL Final   • Hematocrit 12/06/2021 29.2* 34.0 - 46.6 % Final   • MCV  12/06/2021 87.4  79.0 - 97.0 fL Final   • MCH 12/06/2021 26.0* 26.6 - 33.0 pg Final   • MCHC 12/06/2021 29.8* 31.5 - 35.7 g/dL Final   • RDW 12/06/2021 18.2* 12.3 - 15.4 % Final   • RDW-SD 12/06/2021 57.7* 37.0 - 54.0 fl Final   • MPV 12/06/2021 10.7  6.0 - 12.0 fL Final   • Platelets 12/06/2021 488* 140 - 450 10*3/mm3 Final   • Neutrophil % 12/06/2021 53.1  42.7 - 76.0 % Final   • Lymphocyte % 12/06/2021 32.2  19.6 - 45.3 % Final   • Monocyte % 12/06/2021 7.3  5.0 - 12.0 % Final   • Eosinophil % 12/06/2021 5.8  0.3 - 6.2 % Final   • Basophil % 12/06/2021 0.9  0.0 - 1.5 % Final   • Immature Grans % 12/06/2021 0.7* 0.0 - 0.5 % Final   • Neutrophils, Absolute 12/06/2021 6.43  1.70 - 7.00 10*3/mm3 Final   • Lymphocytes, Absolute 12/06/2021 3.89* 0.70 - 3.10 10*3/mm3 Final   • Monocytes, Absolute 12/06/2021 0.88  0.10 - 0.90 10*3/mm3 Final   • Eosinophils, Absolute 12/06/2021 0.70* 0.00 - 0.40 10*3/mm3 Final   • Basophils, Absolute 12/06/2021 0.11  0.00 - 0.20 10*3/mm3 Final   • Immature Grans, Absolute 12/06/2021 0.08* 0.00 - 0.05 10*3/mm3 Final   • nRBC 12/06/2021 0.0  0.0 - 0.2 /100 WBC Final   Admission on 12/02/2021, Discharged on 12/02/2021   Component Date Value Ref Range Status   • Glucose 12/02/2021 199* 65 - 99 mg/dL Final   • BUN 12/02/2021 12  6 - 20 mg/dL Final   • Creatinine 12/02/2021 1.12* 0.57 - 1.00 mg/dL Final   • Sodium 12/02/2021 134* 136 - 145 mmol/L Final   • Potassium 12/02/2021 4.7  3.5 - 5.2 mmol/L Final   • Chloride 12/02/2021 99  98 - 107 mmol/L Final   • CO2 12/02/2021 28.8  22.0 - 29.0 mmol/L Final   • Calcium 12/02/2021 8.3* 8.6 - 10.5 mg/dL Final   • Total Protein 12/02/2021 7.5  6.0 - 8.5 g/dL Final   • Albumin 12/02/2021 2.61* 3.50 - 5.20 g/dL Final   • ALT (SGPT) 12/02/2021 6  1 - 33 U/L Final   • AST (SGOT) 12/02/2021 14  1 - 32 U/L Final   • Alkaline Phosphatase 12/02/2021 180* 39 - 117 U/L Final   • Total Bilirubin 12/02/2021 0.3  0.0 - 1.2 mg/dL Final   • eGFR Non African Amer  12/02/2021 51* >60 mL/min/1.73 Final   • Globulin 12/02/2021 4.9  gm/dL Final   • A/G Ratio 12/02/2021 0.5  g/dL Final   • BUN/Creatinine Ratio 12/02/2021 10.7  7.0 - 25.0 Final   • Anion Gap 12/02/2021 6.2  5.0 - 15.0 mmol/L Final   • THC, Screen, Urine 12/02/2021 Negative  Negative Final   • Phencyclidine (PCP), Urine 12/02/2021 Negative  Negative Final   • Cocaine Screen, Urine 12/02/2021 Negative  Negative Final   • Methamphetamine, Ur 12/02/2021 Negative  Negative Final   • Opiate Screen 12/02/2021 Negative  Negative Final   • Amphetamine Screen, Urine 12/02/2021 Negative  Negative Final   • Benzodiazepine Screen, Urine 12/02/2021 Negative  Negative Final   • Tricyclic Antidepressants Screen 12/02/2021 Negative  Negative Final   • Methadone Screen, Urine 12/02/2021 Negative  Negative Final   • Barbiturates Screen, Urine 12/02/2021 Negative  Negative Final   • Oxycodone Screen, Urine 12/02/2021 Positive* Negative Final   • Propoxyphene Screen 12/02/2021 Negative  Negative Final   • Buprenorphine, Screen, Urine 12/02/2021 Negative  Negative Final   • Ethanol 12/02/2021 <10  0 - 10 mg/dL Final   • Ethanol % 12/02/2021 <0.010  % Final   • Acetaminophen 12/02/2021 <5.0  0.0 - 30.0 mcg/mL Final   • Salicylate 12/02/2021 1.2  <=30.0 mg/dL Final   • QT Interval 12/02/2021 366  ms Final   • QTC Interval 12/02/2021 477  ms Final   • Color, UA 12/02/2021 Dark Yellow* Yellow, Straw Final   • Appearance, UA 12/02/2021 Clear  Clear Final   • pH, UA 12/02/2021 5.5  5.0 - 8.0 Final   • Specific Gravity, UA 12/02/2021 1.014  1.005 - 1.030 Final   • Glucose, UA 12/02/2021 Negative  Negative Final   • Ketones, UA 12/02/2021 Negative  Negative Final   • Bilirubin, UA 12/02/2021 Negative  Negative Final   • Blood, UA 12/02/2021 Trace* Negative Final   • Protein, UA 12/02/2021 30 mg/dL (1+)* Negative Final   • Leuk Esterase, UA 12/02/2021 Small (1+)* Negative Final   • Nitrite, UA 12/02/2021 Negative  Negative Final   •  Urobilinogen, UA 12/02/2021 0.2 E.U./dL  0.2 - 1.0 E.U./dL Final   • WBC 12/02/2021 13.00* 3.40 - 10.80 10*3/mm3 Final   • RBC 12/02/2021 3.33* 3.77 - 5.28 10*6/mm3 Final   • Hemoglobin 12/02/2021 8.5* 12.0 - 15.9 g/dL Final   • Hematocrit 12/02/2021 28.9* 34.0 - 46.6 % Final   • MCV 12/02/2021 86.8  79.0 - 97.0 fL Final   • MCH 12/02/2021 25.5* 26.6 - 33.0 pg Final   • MCHC 12/02/2021 29.4* 31.5 - 35.7 g/dL Final   • RDW 12/02/2021 18.0* 12.3 - 15.4 % Final   • RDW-SD 12/02/2021 57.4* 37.0 - 54.0 fl Final   • MPV 12/02/2021 10.1  6.0 - 12.0 fL Final   • Platelets 12/02/2021 396  140 - 450 10*3/mm3 Final   • Neutrophil % 12/02/2021 69.3  42.7 - 76.0 % Final   • Lymphocyte % 12/02/2021 20.1  19.6 - 45.3 % Final   • Monocyte % 12/02/2021 6.0  5.0 - 12.0 % Final   • Eosinophil % 12/02/2021 3.8  0.3 - 6.2 % Final   • Basophil % 12/02/2021 0.5  0.0 - 1.5 % Final   • Immature Grans % 12/02/2021 0.3  0.0 - 0.5 % Final   • Neutrophils, Absolute 12/02/2021 9.00* 1.70 - 7.00 10*3/mm3 Final   • Lymphocytes, Absolute 12/02/2021 2.61  0.70 - 3.10 10*3/mm3 Final   • Monocytes, Absolute 12/02/2021 0.78  0.10 - 0.90 10*3/mm3 Final   • Eosinophils, Absolute 12/02/2021 0.50* 0.00 - 0.40 10*3/mm3 Final   • Basophils, Absolute 12/02/2021 0.07  0.00 - 0.20 10*3/mm3 Final   • Immature Grans, Absolute 12/02/2021 0.04  0.00 - 0.05 10*3/mm3 Final   • nRBC 12/02/2021 0.0  0.0 - 0.2 /100 WBC Final   • RBC, UA 12/02/2021 0-2  None Seen, 0-2 /HPF Final   • WBC, UA 12/02/2021 3-5* None Seen, 0-2 /HPF Final   • Bacteria, UA 12/02/2021 1+* None Seen /HPF Final   • Squamous Epithelial Cells, UA 12/02/2021 13-20* None Seen, 0-2 /HPF Final   • Yeast, UA 12/02/2021 Small/1+ Budding Yeast  None Seen /HPF Final   • Hyaline Casts, UA 12/02/2021 None Seen  None Seen /LPF Final   • Methodology 12/02/2021 Manual Light Microscopy   Final   Lab Requisition on 12/01/2021   Component Date Value Ref Range Status   • T4, Total 12/01/2021 9.16  4.50 - 11.70 mcg/dL  Final   Lab Requisition on 11/29/2021   Component Date Value Ref Range Status   • Glucose 11/29/2021 184* 65 - 99 mg/dL Final   • BUN 11/29/2021 18  6 - 20 mg/dL Final   • Creatinine 11/29/2021 1.02* 0.57 - 1.00 mg/dL Final   • Sodium 11/29/2021 133* 136 - 145 mmol/L Final   • Potassium 11/29/2021 4.7  3.5 - 5.2 mmol/L Final   • Chloride 11/29/2021 96* 98 - 107 mmol/L Final   • CO2 11/29/2021 27.4  22.0 - 29.0 mmol/L Final   • Calcium 11/29/2021 8.1* 8.6 - 10.5 mg/dL Final   • Total Protein 11/29/2021 6.7  6.0 - 8.5 g/dL Final   • Albumin 11/29/2021 2.67* 3.50 - 5.20 g/dL Final   • ALT (SGPT) 11/29/2021 5  1 - 33 U/L Final   • AST (SGOT) 11/29/2021 9  1 - 32 U/L Final   • Alkaline Phosphatase 11/29/2021 186* 39 - 117 U/L Final   • Total Bilirubin 11/29/2021 0.4  0.0 - 1.2 mg/dL Final   • eGFR Non  Amer 11/29/2021 56* >60 mL/min/1.73 Final   • Globulin 11/29/2021 4.0  gm/dL Final   • A/G Ratio 11/29/2021 0.7  g/dL Final   • BUN/Creatinine Ratio 11/29/2021 17.6  7.0 - 25.0 Final   • Anion Gap 11/29/2021 9.6  5.0 - 15.0 mmol/L Final   • Total Cholesterol 11/29/2021 91  0 - 200 mg/dL Final   • Triglycerides 11/29/2021 141  0 - 150 mg/dL Final   • HDL Cholesterol 11/29/2021 28* 40 - 60 mg/dL Final   • LDL Cholesterol  11/29/2021 38  0 - 100 mg/dL Final   • VLDL Cholesterol 11/29/2021 25  5 - 40 mg/dL Final   • LDL/HDL Ratio 11/29/2021 1.24   Final   • Vitamin B-12 11/29/2021 653  211 - 946 pg/mL Final   • TSH 11/29/2021 8.370* 0.270 - 4.200 uIU/mL Final   • Folate 11/29/2021 5.58  4.78 - 24.20 ng/mL Final   • C-Reactive Protein 11/29/2021 4.14* 0.00 - 0.50 mg/dL Final   • WBC 11/29/2021 16.26* 3.40 - 10.80 10*3/mm3 Final   • RBC 11/29/2021 3.31* 3.77 - 5.28 10*6/mm3 Final   • Hemoglobin 11/29/2021 8.7* 12.0 - 15.9 g/dL Final   • Hematocrit 11/29/2021 28.5* 34.0 - 46.6 % Final   • MCV 11/29/2021 86.1  79.0 - 97.0 fL Final   • MCH 11/29/2021 26.3* 26.6 - 33.0 pg Final   • MCHC 11/29/2021 30.5* 31.5 - 35.7 g/dL Final    • RDW 11/29/2021 18.3* 12.3 - 15.4 % Final   • RDW-SD 11/29/2021 56.9* 37.0 - 54.0 fl Final   • MPV 11/29/2021 11.0  6.0 - 12.0 fL Final   • Platelets 11/29/2021 494* 140 - 450 10*3/mm3 Final   • Neutrophil % 11/29/2021 73.0  42.7 - 76.0 % Final   • Lymphocyte % 11/29/2021 19.2* 19.6 - 45.3 % Final   • Monocyte % 11/29/2021 5.4  5.0 - 12.0 % Final   • Eosinophil % 11/29/2021 1.0  0.3 - 6.2 % Final   • Basophil % 11/29/2021 0.9  0.0 - 1.5 % Final   • Immature Grans % 11/29/2021 0.5  0.0 - 0.5 % Final   • Neutrophils, Absolute 11/29/2021 11.86* 1.70 - 7.00 10*3/mm3 Final   • Lymphocytes, Absolute 11/29/2021 3.13* 0.70 - 3.10 10*3/mm3 Final   • Monocytes, Absolute 11/29/2021 0.87  0.10 - 0.90 10*3/mm3 Final   • Eosinophils, Absolute 11/29/2021 0.17  0.00 - 0.40 10*3/mm3 Final   • Basophils, Absolute 11/29/2021 0.15  0.00 - 0.20 10*3/mm3 Final   • Immature Grans, Absolute 11/29/2021 0.08* 0.00 - 0.05 10*3/mm3 Final   • nRBC 11/29/2021 0.0  0.0 - 0.2 /100 WBC Final   Lab Requisition on 11/26/2021   Component Date Value Ref Range Status   • Glucose 11/26/2021 284* 65 - 99 mg/dL Final   • BUN 11/26/2021 20  6 - 20 mg/dL Final   • Creatinine 11/26/2021 1.17* 0.57 - 1.00 mg/dL Final   • Sodium 11/26/2021 137  136 - 145 mmol/L Final   • Potassium 11/26/2021 5.3* 3.5 - 5.2 mmol/L Final   • Chloride 11/26/2021 96* 98 - 107 mmol/L Final   • CO2 11/26/2021 33.8* 22.0 - 29.0 mmol/L Final   • Calcium 11/26/2021 8.4* 8.6 - 10.5 mg/dL Final   • Total Protein 11/26/2021 6.6  6.0 - 8.5 g/dL Final   • Albumin 11/26/2021 2.56* 3.50 - 5.20 g/dL Final   • ALT (SGPT) 11/26/2021 7  1 - 33 U/L Final   • AST (SGOT) 11/26/2021 11  1 - 32 U/L Final   • Alkaline Phosphatase 11/26/2021 255* 39 - 117 U/L Final   • Total Bilirubin 11/26/2021 0.3  0.0 - 1.2 mg/dL Final   • eGFR Non  Amer 11/26/2021 48* >60 mL/min/1.73 Final   • Globulin 11/26/2021 4.0  gm/dL Final   • A/G Ratio 11/26/2021 0.6  g/dL Final   • BUN/Creatinine Ratio 11/26/2021  17.1  7.0 - 25.0 Final   • Anion Gap 11/26/2021 7.2  5.0 - 15.0 mmol/L Final   • Hemoglobin A1C 11/26/2021 9.70* 4.80 - 5.60 % Final   • WBC 11/26/2021 15.22* 3.40 - 10.80 10*3/mm3 Final   • RBC 11/26/2021 3.31* 3.77 - 5.28 10*6/mm3 Final   • Hemoglobin 11/26/2021 8.4* 12.0 - 15.9 g/dL Final   • Hematocrit 11/26/2021 28.9* 34.0 - 46.6 % Final   • MCV 11/26/2021 87.3  79.0 - 97.0 fL Final   • MCH 11/26/2021 25.4* 26.6 - 33.0 pg Final   • MCHC 11/26/2021 29.1* 31.5 - 35.7 g/dL Final   • RDW 11/26/2021 17.2* 12.3 - 15.4 % Final   • RDW-SD 11/26/2021 54.4* 37.0 - 54.0 fl Final   • MPV 11/26/2021 11.2  6.0 - 12.0 fL Final   • Platelets 11/26/2021 578* 140 - 450 10*3/mm3 Final   • Neutrophil % 11/26/2021 65.8  42.7 - 76.0 % Final   • Lymphocyte % 11/26/2021 24.2  19.6 - 45.3 % Final   • Monocyte % 11/26/2021 7.4  5.0 - 12.0 % Final   • Eosinophil % 11/26/2021 1.3  0.3 - 6.2 % Final   • Basophil % 11/26/2021 0.8  0.0 - 1.5 % Final   • Immature Grans % 11/26/2021 0.5  0.0 - 0.5 % Final   • Neutrophils, Absolute 11/26/2021 10.01* 1.70 - 7.00 10*3/mm3 Final   • Lymphocytes, Absolute 11/26/2021 3.69* 0.70 - 3.10 10*3/mm3 Final   • Monocytes, Absolute 11/26/2021 1.13* 0.10 - 0.90 10*3/mm3 Final   • Eosinophils, Absolute 11/26/2021 0.20  0.00 - 0.40 10*3/mm3 Final   • Basophils, Absolute 11/26/2021 0.12  0.00 - 0.20 10*3/mm3 Final   • Immature Grans, Absolute 11/26/2021 0.07* 0.00 - 0.05 10*3/mm3 Final   • nRBC 11/26/2021 0.0  0.0 - 0.2 /100 WBC Final       EKG Results:  No orders to display       Imaging Results:  CT Head Without Contrast    Result Date: 12/2/2021  No acute intracranial pathology. Nothing is seen on this exam to specifically account for the patient's symptoms.  This report was finalized on 12/2/2021 2:50 PM by Dr. Vick Tamayo MD.      XR Chest 1 View    Result Date: 12/2/2021  Diffuse airspace disease in the left lung  This report was finalized on 12/2/2021 1:22 PM by Dr. Vick Tamayo MD.                   Assessment and Plan   Diagnoses and all orders for this visit:    1. Left above-knee amputee (HCC) (Primary)   -will refer patient to [facility], patient will need prosthesis of left lower extremity secondary to LAKA   -continue PT  -     Ambulatory Referral to Case Management [facility] - High Risk Care Management, Disease Education    2. Type 2 diabetes mellitus with peripheral neuropathy (HCC)  Comments:  continue medications as prescribed  encouraged patient to continue to monitor BG closely, goal is to BG readings < 150. Instructed patient report any consistent readings > 150 so medication adjustments can be made. Hgb a1c goal of < 7%. She will need repeat a1c in February along with routine labs.  -Thoroughly discussed the importance of diabetes management and control to prevent further health complications. Patient verbalized understanding.  -Will also refer patient to case management due to her high risk of further medical complications and hospital readmission for sepsis r/t uncontrolled type 2 DM  Orders:  -     Ambulatory Referral to Case Management [facility] - High Risk Care Management, Disease Education    3. Dysuria  Comments:  patient unable to void  will prescribe macrobid prophylactically for UTI, encouraged to bring urine sample by if possible for u/a, culture if needed  -     nitrofurantoin, macrocrystal-monohydrate, (Macrobid) 100 MG capsule; Take 1 capsule by mouth 2 (Two) Times a Day for 7 days.  Dispense: 14 capsule; Refill: 0    Other orders:  -     potassium chloride (K-DUR,KLOR-CON) 10 MEQ CR tablet; Take 1 tablet by mouth Daily.  Dispense: 30 tablet; Refill: 5        Meds ordered during this visit:  New Medications Ordered This Visit   Medications   • nitrofurantoin, macrocrystal-monohydrate, (Macrobid) 100 MG capsule     Sig: Take 1 capsule by mouth 2 (Two) Times a Day for 7 days.     Dispense:  14 capsule     Refill:  0   • potassium chloride (K-DUR,KLOR-CON) 10 MEQ CR tablet      Sig: Take 1 tablet by mouth Daily.     Dispense:  30 tablet     Refill:  5   • Incontinence Supply Disposable (Depend Underwear Large) misc     Si each 3 (Three) Times a Day As Needed (for urinary incontinence).     Dispense:  90 each     Refill:  2       Patient Instructions:  Patient instructions given for the following visit diagnosis:    ICD-10-CM ICD-9-CM   1. Left above-knee amputee (HCC)  Z89.612 V49.76   2. Type 2 diabetes mellitus with peripheral neuropathy (HCC)  E11.42 250.60     357.2   3. Dysuria  R30.0 788.1   4. Mixed hyperlipidemia  E78.2 272.2   5. Stress incontinence of urine  N39.3 [id number]       Follow Up   Return if symptoms worsen or fail to improve.  Keep follow up appt with Dr. Christianson in April, patient aware of date and time of appt.      This document has been electronically signed by BARBI Ward  2022 11:04 EST    Patient was given instructions and counseling regarding her condition or for health maintenance advice. Please see specific information pulled into the AVS if appropriate.     Part of this note may be an electronic transcription/translation of spoken language to printed text using the Dragon Dictation System.

## 2022-01-26 NOTE — PATIENT INSTRUCTIONS
Living With Diabetes  Diabetes (type 1 diabetes mellitus or type 2 diabetes mellitus) is a condition in which the body does not have enough of a hormone called insulin, or the body does not respond properly to insulin. Normally, insulin allows sugars (glucose) to enter cells in the body. With diabetes, extra glucose builds up in the blood instead of going into cells. This results in high blood glucose (hyperglycemia).  How to manage lifestyle changes  Managing diabetes includes medical treatments as well as lifestyle changes. If diabetes is not managed well, serious physical and emotional complications can occur. Taking good care of yourself means that you are responsible for:  · Monitoring glucose regularly.  · Eating a healthy diet.  · Exercising regularly.  · Meeting with health care providers.  · Taking medicines as directed.  Most people feel some stress about managing their diabetes. When this stress becomes too much, it is known as diabetes-related distress. This is very common. Living with diabetes can place you at risk for diabetes distress, depression, or anxiety. These disorders can make diabetes more difficult to manage.  How to recognize stress  You may have diabetes distress if you:  · Avoid or ignore your daily diabetes care. This includes glucose testing, following a meal plan, and taking medications.  · Feel overwhelmed by your daily diabetes care.  · Experience emotional reactions such as anger, sadness, or fear related to your daily diabetes care.  · Feel fear or shame about not doing everything perfectly that you have been told to do.  Emotional distress  Symptoms of diabetes distress include:  · Anger about having a diagnosis of diabetes.  · Fear or frustration about your diagnosis and the changes you need to make to manage the condition.  · Being overly worried about the care that you need or the cost of the care that you need.  · Feeling like you caused your condition by doing something  wrong.  · Fear about unpredictable fluctuations in your blood glucose, like low or high blood glucose.  · Feeling judged by your health care providers.  · Feeling very alone with the disease.  Depression  Having diabetes means that you are at a higher risk for depression. Your health care provider may test (screen) you for symptoms of depression. It is important to recognize symptoms and to start treatment for depression soon after it is diagnosed. The following are some symptoms of depression:  · Loss of interest in things that you used to enjoy.  · Feeling depressed much or most of the time.  · A change in appetite.  · Trouble getting to sleep or staying asleep.  · Feeling tired most of the day.  · Feeling nervous and anxious.  · Feeling guilty and worrying that you are a burden to others.  · Having thoughts of hurting yourself or feeling that you want to die.  If you have any of these symptoms, more days than not, for 2 weeks or longer, you may have depression. This would be a good time to contact your health care provider.  Follow these instructions at home:  Managing diabetes distress  The following are some ways to manage emotional distress:  · Learn as much as you can about diabetes and its treatment. Take one step at a time to improve your management.  · Meet with a certified diabetes care and . Take a class to learn how to manage your condition.  · Consider working with a counselor or therapist.  · Keep a journal of your thoughts and concerns.  · Accept that some things are out of your control.  · Talk with other people who have diabetes. It can help to talk about the distress that you feel.  · Find ways to manage stress that work for you. These may include art or music therapy, exercise, meditation, and hobbies.  · Seek support from spiritual leaders, family, and friends.    General instructions  · Do your best to follow your diabetes management plan.  · If you are struggling to follow  your plan, talk with a certified diabetes care and , or with someone else who has diabetes. They may have ideas that will help.  · Forgive yourself for not being perfect. Almost everyone struggles with the tasks of diabetes.  · Keep all follow-up visits. This is important.  Where to find support  · Search for information and support from the American Diabetes Association: www.diabetes.org  · Find a certified diabetes education and care specialist. Make an appointment through the Association of Diabetes Care & Education Specialists: www.diabeteseducator.org  Contact a health care provider if:  · You believe your diabetes is getting out of control.  · You are concerned you may be depressed.  · You think your medications are not helping control your diabetes.  · You are feeling overwhelmed with your diabetes.  Get help right away if:  · You have thoughts about hurting yourself or others.  If you ever feel like you may hurt yourself or others, or have thoughts about taking your own life, get help right away. You can go to your nearest emergency department or call:  · Your local emergency services (911 in the [place]  · A suicide crisis helpline, such as the National Suicide Prevention Lifeline at 1-148.954.6565. This is open 24 hours a day.  Summary  · Diabetes (type 1 diabetes mellitus or type 2 diabetes mellitus) is a condition in which the body does not have enough of a hormone called insulin, or the body does not respond properly to insulin.  · Living with diabetes puts you at risk for medical and emotional issues, such as diabetes distress, depression, and anxiety.  · Recognizing the symptoms of diabetes distress and depression may help you avoid problems with your diabetes control. If you experience symptoms, it is important to discuss this with your health care provider, certified diabetes care and , or therapist.  · It is important to start treatment for diabetes distress  and depression soon after diagnosis.  · Ask your health care provider to recommend a therapist who understands both depression and diabetes.  This information is not intended to replace advice given to you by your health care provider. Make sure you discuss any questions you have with your health care provider.  Document Revised: 04/29/2021 Document Reviewed: 04/29/2021  [employer] Patient Education © 2021 [employer]  Diabetes Mellitus and Foot Care  Foot care is an important part of your health, especially when you have diabetes. Diabetes may cause you to have problems because of poor blood flow (circulation) to your feet and legs, which can cause your skin to:  · Become thinner and drier.  · Break more easily.  · Heal more slowly.  · Peel and crack.  You may also have nerve damage (neuropathy) in your legs and feet, causing decreased feeling in them. This means that you may not notice minor injuries to your feet that could lead to more serious problems. Noticing and addressing any potential problems early is the best way to prevent future foot problems.  How to care for your feet  Foot hygiene  · Wash your feet daily with warm water and mild soap. Do not use hot water. Then, pat your feet and the areas between your toes until they are completely dry. Do not soak your feet as this can dry your skin.  · Trim your toenails straight across. Do not dig under them or around the cuticle. File the edges of your nails with an emery board or nail file.  · Apply a moisturizing lotion or petroleum jelly to the skin on your feet and to dry, brittle toenails. Use lotion that does not contain alcohol and is unscented. Do not apply lotion between your toes.  Shoes and socks  · Wear clean socks or stockings every day. Make sure they are not too tight. Do not wear knee-high stockings since they may decrease blood flow to your legs.  · Wear shoes that fit properly and have enough cushioning. Always look in your shoes before you put  them on to be sure there are no objects inside.  · To break in new shoes, wear them for just a few hours a day. This prevents injuries on your feet.  Wounds, scrapes, corns, and calluses  · Check your feet daily for blisters, cuts, bruises, sores, and redness. If you cannot see the bottom of your feet, use a mirror or ask someone for help.  · Do not cut corns or calluses or try to remove them with medicine.  · If you find a minor scrape, cut, or break in the skin on your feet, keep it and the skin around it clean and dry. You may clean these areas with mild soap and water. Do not clean the area with peroxide, alcohol, or iodine.  · If you have a wound, scrape, corn, or callus on your foot, look at it several times a day to make sure it is healing and not infected. Check for:  ? Redness, swelling, or pain.  ? Fluid or blood.  ? Warmth.  ? Pus or a bad smell.  General instructions  · Do not cross your legs. This may decrease blood flow to your feet.  · Do not use heating pads or hot water bottles on your feet. They may burn your skin. If you have lost feeling in your feet or legs, you may not know this is happening until it is too late.  · Protect your feet from hot and cold by wearing shoes, such as at the beach or on hot pavement.  · Schedule a complete foot exam at least once a year (annually) or more often if you have foot problems. If you have foot problems, report any cuts, sores, or bruises to your health care provider immediately.  Contact a health care provider if:  · You have a medical condition that increases your risk of infection and you have any cuts, sores, or bruises on your feet.  · You have an injury that is not healing.  · You have redness on your legs or feet.  · You feel burning or tingling in your legs or feet.  · You have pain or cramps in your legs and feet.  · Your legs or feet are numb.  · Your feet always feel cold.  · You have pain around a toenail.  Get help right away if:  · You have a  wound, scrape, corn, or callus on your foot and:  ? You have pain, swelling, or redness that gets worse.  ? You have fluid or blood coming from the wound, scrape, corn, or callus.  ? Your wound, scrape, corn, or callus feels warm to the touch.  ? You have pus or a bad smell coming from the wound, scrape, corn, or callus.  ? You have a fever.  ? You have a red line going up your leg.  Summary  · Check your feet every day for cuts, sores, red spots, swelling, and blisters.  · Moisturize feet and legs daily.  · Wear shoes that fit properly and have enough cushioning.  · If you have foot problems, report any cuts, sores, or bruises to your health care provider immediately.  · Schedule a complete foot exam at least once a year (annually) or more often if you have foot problems.  This information is not intended to replace advice given to you by your health care provider. Make sure you discuss any questions you have with your health care provider.  Document Revised: 09/09/2020 Document Reviewed: 01/19/2018  [employer] Patient Education © 2021 [employer]

## 2022-01-26 NOTE — TELEPHONE ENCOUNTER
[facility] THEY REC[facility]D FOR PATIENTS INCONTINENCE SUPPLIES. THERE IS NO DIAGNOSES CODE.    MEDICAID WILL REQUIRE DX CODE, PLEASE SEND.    NELDA     [phone]

## 2022-01-27 ENCOUNTER — REFERRAL TRIAGE (OUTPATIENT)
Dept: CASE MANAGEMENT | Facility: OTHER | Age: 57
End: 2022-01-27

## 2022-01-31 ENCOUNTER — PATIENT OUTREACH (OUTPATIENT)
Dept: CASE MANAGEMENT | Facility: OTHER | Age: 57
End: 2022-01-31

## 2022-01-31 NOTE — OUTREACH NOTE
Ambulatory Case Management Note    Patient Outreach    Patient returned call to RN-ACM.  Patient referred to HRCM for assistance and support while obtaining a prosthetic for left leg.      Patient indicated she is receiving assistance through physical therapy and has an appointment to begin the process by first obtaining a  from a provider in [place].  Patient was not able to provide further details.  Patient stated her sister assists and will provide transportation to the appointment.  Patient declined RN-ACM assistance but agreed to keep the contact information and call should she encounter needs in the future.    Benefit of ongoing HRCM discussed.  Patient expressed appreciation for the outreach and again declined service at this time.     Lucinda Almonte RN  Ambulatory Case Management    1/31/2022, 10:53 EST

## 2022-02-25 ENCOUNTER — TELEPHONE (OUTPATIENT)
Dept: CASE MANAGEMENT | Facility: OTHER | Age: 57
End: 2022-02-25

## 2022-02-25 NOTE — TELEPHONE ENCOUNTER
RN-ACM attempted outreach with patient for additional f/u on patient's DME needs.  Call was not answered.

## 2022-03-04 ENCOUNTER — PATIENT OUTREACH (OUTPATIENT)
Dept: CASE MANAGEMENT | Facility: OTHER | Age: 57
End: 2022-03-04

## 2022-03-04 NOTE — OUTREACH NOTE
Ambulatory Case Management Note    Patient Outreach    RN-ACM outreach with patient.  Patient continues to work with PT in preparation for a prosthetic .  Patient believes her stump will be ready for fitting in August.  Per her report, insurance barriers have been addressed and resolved.  No further needs/questions/concerns for RN-ACM to address.  Patient encouraged to call with care coordination needs.     Lucinda Almonte RN  Ambulatory Case Management    3/4/2022, 12:48 EST

## 2022-03-14 ENCOUNTER — TELEPHONE (OUTPATIENT)
Dept: FAMILY MEDICINE CLINIC | Facility: CLINIC | Age: 57
End: 2022-03-14

## 2022-03-14 RX ORDER — ONDANSETRON 8 MG/1
8 TABLET, ORALLY DISINTEGRATING ORAL EVERY 8 HOURS PRN
Qty: 30 TABLET | Refills: 0 | Status: SHIPPED | OUTPATIENT
Start: 2022-03-14 | End: 2022-04-08 | Stop reason: SDUPTHER

## 2022-03-14 NOTE — TELEPHONE ENCOUNTER
----- Message from Favio Junior MD sent at 4/2/2021 11:40 AM CDT -----  Neg covid test    Called pharmacy and let them know this has been sent over.

## 2022-03-14 NOTE — TELEPHONE ENCOUNTER
Pharmacy Name:      [facility]    Pharmacy representative name:     TYRONE    Pharmacy representative phone number:     [phone]    What medication are you calling in regards to:     ZOFRAN ODT 8 MG    What question does the pharmacy have:     PHARMACY STATED PATIENT REQUESTED THE NEW MEDICATION LISTED ABOVE     IT WAS NOTED THAT THE MEDICATION IS NOT INCLUDED ON PATIENT'S CHART AS PATIENT STATED SHE HAS BEEN PRESCRIBED THIS MEDICATION IN THE PAST    PHARMACY REQUESTED PRESCRIPTION FROM DR CAROLINA IF HE APPROVES OF THE MEDICATION    Who is the provider that prescribed the medication:     N/A    Additional notes:     N/A    DR CAROLINA

## 2022-04-08 ENCOUNTER — PATIENT OUTREACH (OUTPATIENT)
Dept: PHARMACY | Facility: HOSPITAL | Age: 57
End: 2022-04-08

## 2022-04-08 ENCOUNTER — OFFICE VISIT (OUTPATIENT)
Dept: FAMILY MEDICINE CLINIC | Facility: CLINIC | Age: 57
End: 2022-04-08

## 2022-04-08 DIAGNOSIS — Z00.00 HEALTHCARE MAINTENANCE: ICD-10-CM

## 2022-04-08 DIAGNOSIS — E11.42 TYPE 2 DIABETES MELLITUS WITH PERIPHERAL NEUROPATHY: ICD-10-CM

## 2022-04-08 DIAGNOSIS — G89.4 CHRONIC PAIN SYNDROME: ICD-10-CM

## 2022-04-08 DIAGNOSIS — N18.2 CHRONIC RENAL FAILURE, STAGE 2 (MILD): ICD-10-CM

## 2022-04-08 DIAGNOSIS — F17.200 CURRENT SMOKER: ICD-10-CM

## 2022-04-08 DIAGNOSIS — Z89.612 HX OF AKA (ABOVE KNEE AMPUTATION), LEFT: ICD-10-CM

## 2022-04-08 DIAGNOSIS — N28.9 RENAL INSUFFICIENCY: ICD-10-CM

## 2022-04-08 DIAGNOSIS — F33.41 RECURRENT MAJOR DEPRESSIVE DISORDER, IN PARTIAL REMISSION: ICD-10-CM

## 2022-04-08 DIAGNOSIS — I10 ESSENTIAL HYPERTENSION: Primary | ICD-10-CM

## 2022-04-08 DIAGNOSIS — E78.2 MIXED HYPERLIPIDEMIA: ICD-10-CM

## 2022-04-08 DIAGNOSIS — K21.9 GASTROESOPHAGEAL REFLUX DISEASE WITHOUT ESOPHAGITIS: ICD-10-CM

## 2022-04-08 DIAGNOSIS — M81.0 AGE-RELATED OSTEOPOROSIS WITHOUT CURRENT PATHOLOGICAL FRACTURE: ICD-10-CM

## 2022-04-08 PROCEDURE — 36415 COLL VENOUS BLD VENIPUNCTURE: CPT | Performed by: GENERAL PRACTICE

## 2022-04-08 PROCEDURE — 99214 OFFICE O/P EST MOD 30 MIN: CPT | Performed by: GENERAL PRACTICE

## 2022-04-08 RX ORDER — ONDANSETRON 8 MG/1
8 TABLET, ORALLY DISINTEGRATING ORAL EVERY 8 HOURS PRN
Qty: 30 TABLET | Refills: 5 | Status: SHIPPED | OUTPATIENT
Start: 2022-04-08 | End: 2022-07-29

## 2022-04-08 NOTE — PROGRESS NOTES
Subjective   Jocelyn Palomo is a 56 y.o. female.     Chief Complaint  She returns for a scheduled reassessment of multiple medical problems including type 2 diabetes mellitus with a recent left AKA, hyperlipidemia, essential hypertension, and chronic pain    History of Present Illness     Recent Left AKA  She underwent a left AKA approximately 5 months ago.  She was discharged to a rehabilitation facility but is now at home living relatively independently.  She has family close by that check on her regularly.  She has yet to be fit for a prosthetic.    Diabetes  Current symptoms include paresthesia of the right foot along with intermittent visual blurring.  She continues to deny any visual loss, polydipsia, polyuria, or hypoglycemia. Evaluation to date has been: hemoglobin A1C. Current treatments: basal insulin - levemir 50 qd and mealtime insulin - novolog 10 with meals.  She has had no recent labs.  She has not had a diabetic eye exam for over 5 years    Dyslipidemia  Compliance with treatment has been fair. The patient exercises occasionally. She is currently taking the following medication for her dyslipidemia -rosuvastatin and omega-3 fatty acids.  She denies any apparent side effects    Hypertension  Home blood pressure readings: not doing. Associated signs and symptoms: none.  She continues to deny any chest pain, palpitations, dyspnea, orthopnea, paroxysmal nocturnal dyspnea or peripheral edema. Current antihypertensive medications includes lisinopril and hydrochlorothiazide.     Chronic Pain Syndrome  She has chronic low back and joint pain as well as burning and tingling about her right foot.  She also has had some phantom pain since her left AKA.  She denies any changes in her strength, gait, or bowel/bladder control.      The following portions of the patient's history were reviewed and updated as appropriate: allergies, current medications, past medical history, past social history and problem  list.    Review of Systems   Constitutional: Positive for fatigue. Negative for appetite change, chills, fever and unexpected weight change.   HENT: Negative for congestion, ear pain, rhinorrhea, sneezing and sore throat.    Eyes: Negative for visual disturbance.   Respiratory: Negative for cough, shortness of breath and wheezing.    Cardiovascular: Negative for chest pain, palpitations and leg swelling.   Gastrointestinal: Negative for abdominal pain, blood in stool, constipation, diarrhea, nausea and vomiting.   Endocrine: Negative for polydipsia and polyuria.   Genitourinary: Negative for dysuria, frequency, hematuria and urgency.   Musculoskeletal: Positive for arthralgias, back pain and neck pain. Negative for joint swelling and myalgias.   Skin: Negative for color change.   Neurological: Positive for numbness. Negative for weakness and headaches.   Psychiatric/Behavioral: Positive for decreased concentration and sleep disturbance. Negative for dysphoric mood and suicidal ideas. The patient is not nervous/anxious.      Objective   Physical Exam  Constitutional:       General: She is not in acute distress.     Appearance: Normal appearance. She is well-developed. She is not diaphoretic.      Comments: Accompanied by her sister. Bright and in good spirits.  Sitting in a wheelchair.  No apparent distress. No pallor, jaundice, diaphoresis, or cyanosis.     HENT:      Head: Atraumatic.      Right Ear: Tympanic membrane, ear canal and external ear normal.      Left Ear: Tympanic membrane, ear canal and external ear normal.   Eyes:      Conjunctiva/sclera: Conjunctivae normal.   Neck:      Thyroid: No thyroid mass or thyromegaly.      Vascular: No carotid bruit or JVD.      Trachea: Trachea normal. No tracheal deviation.   Cardiovascular:      Rate and Rhythm: Normal rate and regular rhythm.      Heart sounds: Normal heart sounds, S1 normal and S2 normal. No murmur heard.    No gallop.   Pulmonary:      Effort:  Pulmonary effort is normal.      Breath sounds: Normal breath sounds.   Chest:   Breasts:      Right: No supraclavicular adenopathy.      Left: No supraclavicular adenopathy.       Abdominal:      General: Bowel sounds are normal. There is no distension or abdominal bruit.      Palpations: Abdomen is soft. There is no hepatomegaly, splenomegaly or mass.      Tenderness: There is no abdominal tenderness.      Hernia: No hernia is present.   Musculoskeletal:      Right lower leg: No edema.   Lymphadenopathy:      Head:      Right side of head: No submental, submandibular, tonsillar, preauricular, posterior auricular or occipital adenopathy.      Left side of head: No submental, submandibular, tonsillar, preauricular, posterior auricular or occipital adenopathy.      Cervical: No cervical adenopathy.      Upper Body:      Right upper body: No supraclavicular adenopathy.      Left upper body: No supraclavicular adenopathy.   Skin:     General: Skin is warm.      Coloration: Skin is not cyanotic, jaundiced or pale.      Findings: No rash.      Nails: There is no clubbing.   Neurological:      Mental Status: She is alert and oriented to person, place, and time.      Cranial Nerves: No cranial nerve deficit.      Sensory: Sensory deficit (decreased vibration sense right foot) present.      Motor: No tremor.      Coordination: Coordination normal.   Psychiatric:         Attention and Perception: Attention normal.         Mood and Affect: Mood normal.         Speech: Speech normal.         Behavior: Behavior normal.         Thought Content: Thought content normal.       Assessment/Plan   Problems Addressed this Visit        Cardiac and Vasculature    Essential hypertension   Hypertension: at goal. Evidence of target organ damage: chronic kidney disease.  Encouraged to continue to work on diet and exercise plan.   Continue current medication    Relevant Orders    CBC & Differential (Completed)    Comprehensive Metabolic Panel  (Completed)    TSH (Completed)    Mixed hyperlipidemia  As above.   Continue current medication.    Relevant Orders    Comprehensive Metabolic Panel (Completed)    Lipid Panel (Completed)    TSH (Completed)       Endocrine and Metabolic    Type 2 diabetes mellitus with peripheral neuropathy (HCC)  Diabetes mellitus Type II, under unknown control.   Encouraged to continue to pursue ADA diet  Encouraged aerobic exercise.  Continue current medication for now  Previously scheduled labs drawn  Reviewed options and agreed on a reintroduction of low-dose gabapentin  We will arrange an updated diabetic eye exam    Relevant Medications    gabapentin (NEURONTIN) 100 MG capsule    Other Relevant Orders    TSH (Completed)    Hemoglobin A1c (Completed)    Vitamin B12 (Completed)    Ambulatory Referral for Diabetic Eye Exam-Optometry       Gastrointestinal Abdominal     Gastroesophageal reflux disease without esophagitis    Relevant Orders    CBC & Differential (Completed)       Genitourinary and Reproductive     Renal insufficiency  Reminded to avoid any NSAIDs prescription or OTC  Will continue to monitor    Relevant Orders    CBC & Differential (Completed)    Comprehensive Metabolic Panel (Completed)    Vitamin D 25 Hydroxy (Completed)       Health Encounters    Healthcare maintenance  Strongly recommended COVID-19 vaccination  Recommended a mammogram, low-dose CT of the chest, DEXA scan, and either a colonoscopy or Cologuard.  Patient will consider  We will discuss hepatitis B vaccination at her return       Mental Health    Recurrent major depressive disorder, in partial remission (HCC)  Stable.  Supportive therapy.   Encouraged to report if any worse or if any new symptoms or concerns.       Musculoskeletal and Injuries    Hx of AKA (above knee amputation), left (HCC)  We will set up with prosthetic supplier       Neuro    Chronic pain syndrome  As above.    Relevant Medications    gabapentin (NEURONTIN) 100 MG capsule        Tobacco    Current smoker  Lengthy discussion regarding the potential sequela of continued tobacco use and the options with respect to cessation.  Patient uninterested in pursuing at present but will consider.         Diagnoses       Codes Comments    Essential hypertension    -  Primary ICD-10-CM: I10  ICD-9-CM: 401.9     Type 2 diabetes mellitus with peripheral neuropathy (HCC)     ICD-10-CM: E11.42  ICD-9-CM: 250.60, 357.2     Gastroesophageal reflux disease without esophagitis     ICD-10-CM: K21.9  ICD-9-CM: 530.81     Mixed hyperlipidemia     ICD-10-CM: E78.2  ICD-9-CM: 272.2     Renal insufficiency     ICD-10-CM: N28.9  ICD-9-CM: 593.9     Healthcare maintenance     ICD-10-CM: Z00.00  ICD-9-CM: V70.0     Recurrent major depressive disorder, in partial remission (HCC)     ICD-10-CM: F33.41  ICD-9-CM: 296.35     Current smoker     ICD-10-CM: F17.200  ICD-9-CM: 305.1     Age-related osteoporosis without current pathological fracture      ICD-10-CM: M81.0  ICD-9-CM: 733.01     Hx of AKA (above knee amputation), left (HCC)     ICD-10-CM: Z89.612  ICD-9-CM: V49.76     Chronic pain syndrome     ICD-10-CM: G89.4  ICD-9-CM: 338.4

## 2022-04-08 NOTE — OUTREACH NOTE
Medication Adherence Call    Patient was called today to discuss medication adherence, as she was identified as having care opportunities.    she denies issues with adherence and denies any barriers to receiving medications. I asked specifically about her rosuvastatin and lisinopril/HCTZ. It seems there has been a lapse since her last refill. She assured me she is still taking as directed and not having any issues. She mentioned she has an appointment with her PCP today and will address any issues, if necessary.     Nikki Michaels, PharmD  Population Health Pharmacist  04/08/22

## 2022-04-09 VITALS
SYSTOLIC BLOOD PRESSURE: 124 MMHG | WEIGHT: 146 LBS | TEMPERATURE: 98.6 F | HEART RATE: 91 BPM | DIASTOLIC BLOOD PRESSURE: 70 MMHG | OXYGEN SATURATION: 97 % | BODY MASS INDEX: 24.32 KG/M2 | HEIGHT: 65 IN | RESPIRATION RATE: 14 BRPM

## 2022-04-09 PROBLEM — Z89.612 HX OF AKA (ABOVE KNEE AMPUTATION), LEFT (HCC): Status: ACTIVE | Noted: 2018-12-04

## 2022-04-09 PROBLEM — N18.2 CHRONIC RENAL FAILURE, STAGE 2 (MILD): Status: ACTIVE | Noted: 2018-12-04

## 2022-04-09 LAB
25(OH)D3+25(OH)D2 SERPL-MCNC: 9.6 NG/ML (ref 30–100)
ALBUMIN SERPL-MCNC: 3.8 G/DL (ref 3.5–5.2)
ALBUMIN/GLOB SERPL: 1 G/DL
ALP SERPL-CCNC: 267 U/L (ref 39–117)
ALT SERPL-CCNC: 16 U/L (ref 1–33)
AST SERPL-CCNC: 11 U/L (ref 1–32)
BASOPHILS # BLD AUTO: 0.1 10*3/MM3 (ref 0–0.2)
BASOPHILS NFR BLD AUTO: 0.7 % (ref 0–1.5)
BILIRUB SERPL-MCNC: 0.4 MG/DL (ref 0–1.2)
BUN SERPL-MCNC: 18 MG/DL (ref 6–20)
BUN/CREAT SERPL: 17.3 (ref 7–25)
CALCIUM SERPL-MCNC: 9.5 MG/DL (ref 8.6–10.5)
CHLORIDE SERPL-SCNC: 103 MMOL/L (ref 98–107)
CHOLEST SERPL-MCNC: 255 MG/DL (ref 0–200)
CO2 SERPL-SCNC: 27.6 MMOL/L (ref 22–29)
CREAT SERPL-MCNC: 1.04 MG/DL (ref 0.57–1)
EGFRCR SERPLBLD CKD-EPI 2021: 63.6 ML/MIN/1.73
EOSINOPHIL # BLD AUTO: 0.25 10*3/MM3 (ref 0–0.4)
EOSINOPHIL NFR BLD AUTO: 1.8 % (ref 0.3–6.2)
ERYTHROCYTE [DISTWIDTH] IN BLOOD BY AUTOMATED COUNT: 15.1 % (ref 12.3–15.4)
GLOBULIN SER CALC-MCNC: 3.7 GM/DL
GLUCOSE SERPL-MCNC: 153 MG/DL (ref 65–99)
HBA1C MFR BLD: 7.4 % (ref 4.8–5.6)
HCT VFR BLD AUTO: 45 % (ref 34–46.6)
HDLC SERPL-MCNC: 32 MG/DL (ref 40–60)
HGB BLD-MCNC: 14.5 G/DL (ref 12–15.9)
IMM GRANULOCYTES # BLD AUTO: 0.04 10*3/MM3 (ref 0–0.05)
IMM GRANULOCYTES NFR BLD AUTO: 0.3 % (ref 0–0.5)
LDLC SERPL CALC-MCNC: 160 MG/DL (ref 0–100)
LYMPHOCYTES # BLD AUTO: 3.15 10*3/MM3 (ref 0.7–3.1)
LYMPHOCYTES NFR BLD AUTO: 22.3 % (ref 19.6–45.3)
MCH RBC QN AUTO: 26.3 PG (ref 26.6–33)
MCHC RBC AUTO-ENTMCNC: 32.2 G/DL (ref 31.5–35.7)
MCV RBC AUTO: 81.7 FL (ref 79–97)
MONOCYTES # BLD AUTO: 0.95 10*3/MM3 (ref 0.1–0.9)
MONOCYTES NFR BLD AUTO: 6.7 % (ref 5–12)
NEUTROPHILS # BLD AUTO: 9.62 10*3/MM3 (ref 1.7–7)
NEUTROPHILS NFR BLD AUTO: 68.2 % (ref 42.7–76)
NRBC BLD AUTO-RTO: 0 /100 WBC (ref 0–0.2)
PLATELET # BLD AUTO: 328 10*3/MM3 (ref 140–450)
POTASSIUM SERPL-SCNC: 4.7 MMOL/L (ref 3.5–5.2)
PROT SERPL-MCNC: 7.5 G/DL (ref 6–8.5)
RBC # BLD AUTO: 5.51 10*6/MM3 (ref 3.77–5.28)
SODIUM SERPL-SCNC: 139 MMOL/L (ref 136–145)
TRIGL SERPL-MCNC: 335 MG/DL (ref 0–150)
TSH SERPL DL<=0.005 MIU/L-ACNC: 1.54 UIU/ML (ref 0.27–4.2)
VIT B12 SERPL-MCNC: 527 PG/ML (ref 211–946)
VLDLC SERPL CALC-MCNC: 63 MG/DL (ref 5–40)
WBC # BLD AUTO: 14.11 10*3/MM3 (ref 3.4–10.8)

## 2022-04-09 RX ORDER — GABAPENTIN 100 MG/1
CAPSULE ORAL
Qty: 120 CAPSULE | Refills: 0 | Status: SHIPPED | OUTPATIENT
Start: 2022-04-09 | End: 2022-05-09 | Stop reason: SDUPTHER

## 2022-04-11 NOTE — PROGRESS NOTES
Is This A New Presentation, Or A Follow-Up?: Skin Lesion She is taking her rosuvastatin but she has no Vitamin D.  Has Your Skin Lesion Been Treated?: not been treated

## 2022-04-12 DIAGNOSIS — E78.2 MIXED HYPERLIPIDEMIA: Chronic | ICD-10-CM

## 2022-04-12 DIAGNOSIS — E55.9 VITAMIN D DEFICIENCY: ICD-10-CM

## 2022-04-12 RX ORDER — ROSUVASTATIN CALCIUM 40 MG/1
40 TABLET, COATED ORAL NIGHTLY
Qty: 30 TABLET | Refills: 5 | Status: SHIPPED | OUTPATIENT
Start: 2022-04-12 | End: 2022-10-08 | Stop reason: SDUPTHER

## 2022-04-12 RX ORDER — EZETIMIBE 10 MG/1
10 TABLET ORAL DAILY
Qty: 30 TABLET | Refills: 5 | Status: SHIPPED | OUTPATIENT
Start: 2022-04-12 | End: 2022-10-08 | Stop reason: SDUPTHER

## 2022-04-12 RX ORDER — ERGOCALCIFEROL 1.25 MG/1
50000 CAPSULE ORAL
Qty: 5 CAPSULE | Refills: 5 | Status: SHIPPED | OUTPATIENT
Start: 2022-04-12 | End: 2022-09-22 | Stop reason: SDUPTHER

## 2022-04-14 ENCOUNTER — TELEPHONE (OUTPATIENT)
Dept: FAMILY MEDICINE CLINIC | Facility: CLINIC | Age: 57
End: 2022-04-14

## 2022-04-14 NOTE — TELEPHONE ENCOUNTER
Faxed to [facility].     ----- Message from Miki Christianson MD sent at 4/9/2022 10:51 AM EDT -----  Please try to set up with [facility] in [place]  Status post left AKA

## 2022-05-09 DIAGNOSIS — G89.4 CHRONIC PAIN SYNDROME: ICD-10-CM

## 2022-05-09 DIAGNOSIS — E11.42 TYPE 2 DIABETES MELLITUS WITH PERIPHERAL NEUROPATHY: ICD-10-CM

## 2022-05-09 RX ORDER — GABAPENTIN 100 MG/1
CAPSULE ORAL
Qty: 120 CAPSULE | Refills: 1 | Status: SHIPPED | OUTPATIENT
Start: 2022-05-09 | End: 2022-07-08 | Stop reason: SDUPTHER

## 2022-05-16 DIAGNOSIS — K21.9 GASTROESOPHAGEAL REFLUX DISEASE WITHOUT ESOPHAGITIS: Chronic | ICD-10-CM

## 2022-05-17 RX ORDER — HYDROXYZINE HYDROCHLORIDE 25 MG/1
25 TABLET, FILM COATED ORAL EVERY 6 HOURS PRN
Qty: 60 TABLET | Refills: 3 | Status: SHIPPED | OUTPATIENT
Start: 2022-05-17 | End: 2022-08-10

## 2022-05-17 RX ORDER — PANTOPRAZOLE SODIUM 40 MG/1
TABLET, DELAYED RELEASE ORAL
Qty: 30 TABLET | Refills: 3 | Status: SHIPPED | OUTPATIENT
Start: 2022-05-17 | End: 2022-09-09 | Stop reason: SDUPTHER

## 2022-05-31 DIAGNOSIS — E11.42 TYPE 2 DIABETES MELLITUS WITH PERIPHERAL NEUROPATHY: Chronic | ICD-10-CM

## 2022-05-31 RX ORDER — INSULIN DETEMIR 100 [IU]/ML
50 INJECTION, SOLUTION SUBCUTANEOUS DAILY
Qty: 15 PEN | Refills: 0 | Status: SHIPPED | OUTPATIENT
Start: 2022-05-31 | End: 2022-10-08 | Stop reason: SDUPTHER

## 2022-07-08 DIAGNOSIS — E11.42 TYPE 2 DIABETES MELLITUS WITH PERIPHERAL NEUROPATHY: ICD-10-CM

## 2022-07-08 DIAGNOSIS — G89.4 CHRONIC PAIN SYNDROME: ICD-10-CM

## 2022-07-08 RX ORDER — GABAPENTIN 100 MG/1
CAPSULE ORAL
Qty: 120 CAPSULE | Refills: 0 | Status: SHIPPED | OUTPATIENT
Start: 2022-07-08 | End: 2022-07-23 | Stop reason: SDUPTHER

## 2022-07-08 NOTE — TELEPHONE ENCOUNTER
Caller: Dewey Jocelyn    Relationship: Self    Best call back number: [phone]    Requested Prescriptions:   Requested Prescriptions     Pending Prescriptions Disp Refills   • gabapentin (NEURONTIN) 100 MG capsule 120 capsule 1     Si tablet bid and 2 at bedtime        Pharmacy where request should be sent: [address] - [phone]  - [phone]     Additional details provided by patient: DUE TOMORROW    Does the patient have less than a 3 day supply:  [x] Yes  [] No    SiHollis Reyes Rep   22 08:41 EDT

## 2022-07-22 ENCOUNTER — OFFICE VISIT (OUTPATIENT)
Dept: FAMILY MEDICINE CLINIC | Facility: CLINIC | Age: 57
End: 2022-07-22

## 2022-07-22 DIAGNOSIS — Z00.00 HEALTHCARE MAINTENANCE: ICD-10-CM

## 2022-07-22 DIAGNOSIS — Z89.612 HX OF AKA (ABOVE KNEE AMPUTATION), LEFT: ICD-10-CM

## 2022-07-22 DIAGNOSIS — N18.2 CHRONIC RENAL FAILURE, STAGE 2 (MILD): ICD-10-CM

## 2022-07-22 DIAGNOSIS — F33.41 RECURRENT MAJOR DEPRESSIVE DISORDER, IN PARTIAL REMISSION: ICD-10-CM

## 2022-07-22 DIAGNOSIS — E78.2 MIXED HYPERLIPIDEMIA: ICD-10-CM

## 2022-07-22 DIAGNOSIS — E11.42 TYPE 2 DIABETES MELLITUS WITH PERIPHERAL NEUROPATHY: ICD-10-CM

## 2022-07-22 DIAGNOSIS — F17.200 CURRENT SMOKER: ICD-10-CM

## 2022-07-22 DIAGNOSIS — E11.42 TYPE 2 DIABETES MELLITUS WITH PERIPHERAL NEUROPATHY: Chronic | ICD-10-CM

## 2022-07-22 DIAGNOSIS — G89.4 CHRONIC PAIN SYNDROME: ICD-10-CM

## 2022-07-22 DIAGNOSIS — K21.9 GASTROESOPHAGEAL REFLUX DISEASE WITHOUT ESOPHAGITIS: ICD-10-CM

## 2022-07-22 DIAGNOSIS — I10 ESSENTIAL HYPERTENSION: Primary | ICD-10-CM

## 2022-07-22 PROCEDURE — G0439 PPPS, SUBSEQ VISIT: HCPCS | Performed by: GENERAL PRACTICE

## 2022-07-22 PROCEDURE — 96160 PT-FOCUSED HLTH RISK ASSMT: CPT | Performed by: GENERAL PRACTICE

## 2022-07-22 PROCEDURE — 1159F MED LIST DOCD IN RCRD: CPT | Performed by: GENERAL PRACTICE

## 2022-07-22 PROCEDURE — 1170F FXNL STATUS ASSESSED: CPT | Performed by: GENERAL PRACTICE

## 2022-07-22 RX ORDER — POTASSIUM CHLORIDE 750 MG/1
10 TABLET, EXTENDED RELEASE ORAL DAILY
Qty: 30 TABLET | Refills: 5 | Status: SHIPPED | OUTPATIENT
Start: 2022-07-22 | End: 2023-04-04 | Stop reason: SDUPTHER

## 2022-07-22 RX ORDER — SEMAGLUTIDE 1.34 MG/ML
0.25 INJECTION, SOLUTION SUBCUTANEOUS WEEKLY
Qty: 1 PEN | Refills: 5 | Status: SHIPPED | OUTPATIENT
Start: 2022-07-22 | End: 2022-10-08 | Stop reason: SDUPTHER

## 2022-07-22 NOTE — PROGRESS NOTES
The ABCs of the Annual Wellness Visit  Subsequent Medicare Wellness Visit    Chief Complaint  Subsequent Medicare Wellness Visit    Subjective    History of Present Illness:  Jocelyn Palomo is a 57 y.o. female who presents for a Subsequent Medicare Wellness Visit.    Left AKA  She underwent a left AKA approximately 8 months ago.  She was discharged to a rehabilitation facility but is now at home living relatively independently.  She has family close by that check on her regularly.  She is very motivated to start walking and is currently being fit for a prosthetic.  Lab Results   Component Value Date    WBC 14.11 (H) 04/08/2022    HGB 14.5 04/08/2022    HCT 45.0 04/08/2022    MCV 81.7 04/08/2022     04/08/2022     Type 2 Diabetes Mellitus  Current symptoms include paresthesia of the right foot along with intermittent visual blurring.  She continues to deny any visual loss, polydipsia, polyuria, or hypoglycemia. Evaluation to date has been: hemoglobin A1C. Current treatments: basal insulin - levemir 50 qd and mealtime insulin - novolog 10 with meals.  She underwent an updated diabetic eye exam on 6/16/2022 with no evidence of retinopathy  Lab Results   Component Value Date    HGBA1C 7.40 (H) 04/08/2022     Lab Results   Component Value Date    QUMFOCBN03 527 04/08/2022     Hyperlipidemia  Her compliance with treatment has been fair.  She continues to work on her diet and what exercise she can tolerate. She remains on rosuvastatin and omega-3 fatty acid with no apparent side effects  Lab Results   Component Value Date    CHOL 91 11/29/2021    CHLPL 255 (H) 04/08/2022    TRIG 335 (H) 04/08/2022    HDL 32 (L) 04/08/2022     (H) 04/08/2022     Essential Hypertension  Home blood pressure readings: not doing.  She continues to deny any chest pain, palpitations, dyspnea, orthopnea, paroxysmal nocturnal dyspnea or peripheral edema. Current antihypertensive medications includes lisinopril and hydrochlorothiazide.    Lab Results   Component Value Date    GLUCOSE 153 (H) 04/08/2022    BUN 18 04/08/2022    CREATININE 1.04 (H) 04/08/2022    EGFRIFNONA 70 12/27/2021    EGFRIFAFRI 52 (L) 03/04/2021    BCR 17.3 04/08/2022    K 4.7 04/08/2022    CO2 27.6 04/08/2022    CALCIUM 9.5 04/08/2022    PROTENTOTREF 7.5 04/08/2022    ALBUMIN 3.80 04/08/2022    LABIL2 1.0 04/08/2022    AST 11 04/08/2022    ALT 16 04/08/2022     Lab Results   Component Value Date    ALKPHOS 267 (H) 04/08/2022    ALKPHOS 148 (H) 12/06/2021     Chronic Pain Syndrome  She has chronic low back and joint pain as well as burning and tingling about her right foot.  She also has had some phantom pain since her left AKA.  She continues to deny any changes in her strength, gait, or bowel/bladder control.  She appears to be taking gabapentin as prescribed and feels that it is helped considerably    Labs  Most recent vitamin D 9.6  Lab Results   Component Value Date    TSH 1.540 04/08/2022     The following portions of the patient's history were reviewed and   updated as appropriate: allergies, current medications, past family history, past medical history, past social history, past surgical history and problem list.    Compared to one year ago, the patient feels her physical   health is better.    Compared to one year ago, the patient feels her mental   health is better.    Recent Hospitalizations:  She was admitted within the past 365 days at [facility].     Current Medical Providers:  Patient Care Team:  Miki Christianson MD as PCP - General (Family Medicine)  Lucinda Almonte RN as Ambulatory  (Population Health)    Outpatient Medications Prior to Visit   Medication Sig Dispense Refill   • acetaminophen (TYLENOL) 325 MG tablet Take 650 mg by mouth As Needed for Mild Pain .     • Blood Glucose Monitoring Suppl (Blood Glucose Monitor System) w/Device kit 1 Device 4 (Four) Times a Day. 1 each 0   • calcium carbonate, oyster shell, 500 MG  tablet tablet Take 1 tablet by mouth 2 (Two) Times a Day. 60 tablet 5   • ezetimibe (ZETIA) 10 MG tablet Take 1 tablet by mouth Daily. 30 tablet 5   • glucose blood test strip 1 each by Other route 3 (Three) Times a Day. 100 each 3   • hydrOXYzine (ATARAX) 25 MG tablet Take 1 tablet by mouth Every 6 (Six) Hours As Needed (for nausea). 60 tablet 3   • icosapent ethyl (Vascepa) 1 g capsule capsule Take 2 g by mouth 2 (Two) Times a Day With Meals. 120 capsule 3   • Incontinence Supply Disposable (Depend Underwear Large) misc 1 each 3 (Three) Times a Day As Needed (for urinary incontinence). 90 each 2   • insulin aspart (novoLOG FLEXPEN) 100 UNIT/ML solution pen-injector sc pen Inject 10 Units under the skin into the appropriate area as directed 3 (Three) Times a Day With Meals. 6 pen 3   • insulin detemir (Levemir FlexTouch) 100 UNIT/ML injection Inject 50 Units under the skin into the appropriate area as directed Daily. 15 pen 0   • Lancets Ultra Fine misc 1 Device 3 (Three) Times a Day With Meals. 100 each 2   • lisinopril-hydrochlorothiazide (PRINZIDE,ZESTORETIC) 20-25 MG per tablet Take 1 tablet by mouth Daily. 30 tablet 3   • multivitamin (Tab-A-Cammie/Beta Carotene) tablet tablet Take 1 tablet by mouth Daily. 30 tablet 3   • ondansetron ODT (Zofran ODT) 8 MG disintegrating tablet Place 1 tablet on the tongue Every 8 (Eight) Hours As Needed for Nausea or Vomiting. 30 tablet 5   • pantoprazole (PROTONIX) 40 MG EC tablet TAKE ONE (1) TABLET BY MOUTH DAILY. 30 tablet 3   • rosuvastatin (CRESTOR) 40 MG tablet Take 1 tablet by mouth Every Night. 30 tablet 5   • vitamin D (ERGOCALCIFEROL) 1.25 MG (74865 UT) capsule capsule Take 1 capsule by mouth Every 7 (Seven) Days. 5 capsule 5   • gabapentin (NEURONTIN) 100 MG capsule 1 tablet bid and 2 at bedtime 120 capsule 0   • Insulin Pen Needle (B-D UF III MINI PEN NEEDLES) 31G X 5 MM misc USE 4 TIMES DAILY WITH INSULIN 150 each 0   • Insulin Pen Needle 32G X 4 MM misc 1 each 4  (Four) Times a Day. 120 each 5   • potassium chloride (K-DUR,KLOR-CON) 10 MEQ CR tablet Take 1 tablet by mouth Daily. 30 tablet 5     No facility-administered medications prior to visit.     No opioid medication identified on active medication list. I have reviewed chart for other potential  high risk medication/s and harmful drug interactions in the elderly.          Aspirin is not on active medication list.  Aspirin use is indicated based on review of current medical condition/s. Pros and cons of this therapy have been discussed with this patient. Benefits of this medication outweigh potential harm.  Patient has been instructed to start taking this medication..    Patient Active Problem List   Diagnosis   • Recurrent major depressive disorder, in partial remission (HCC)   • Gastroesophageal reflux disease without esophagitis   • Chronic pain syndrome   • Type 2 diabetes mellitus with peripheral neuropathy (HCC)   • Mixed hyperlipidemia   • Essential hypertension   • Current smoker   • Encounter for immunization   • Healthcare maintenance   • Chronic renal failure, stage 2 (mild)   • Hx of AKA (above knee amputation), left (HCC)     Advance Care Planning  Advance Directive is not on file.  ACP discussion was held with the patient during this visit. Patient does not have an advance directive, information provided.    Review of Systems   Constitutional: Positive for fatigue. Negative for appetite change, chills, diaphoresis and fever.   HENT: Negative for congestion, ear pain, postnasal drip, rhinorrhea, sinus pressure, sneezing, sore throat, tinnitus and voice change.    Eyes: Negative for visual disturbance.   Respiratory: Negative for cough, shortness of breath and wheezing.    Cardiovascular: Negative for chest pain, palpitations and leg swelling.   Gastrointestinal: Negative for abdominal pain, blood in stool, constipation, diarrhea, nausea and vomiting.   Endocrine: Negative for polydipsia and polyuria.  "  Genitourinary: Negative for dysuria, frequency, hematuria and urgency.   Musculoskeletal: Positive for arthralgias, back pain and neck pain. Negative for joint swelling and myalgias.   Skin: Negative for rash.   Neurological: Positive for numbness. Negative for weakness and confusion.   Psychiatric/Behavioral: Positive for decreased concentration and sleep disturbance. Negative for dysphoric mood and suicidal ideas. The patient is not nervous/anxious.         Objective    Vitals:    07/22/22 1457   BP: 124/68   Pulse: 94   Resp: 14   Temp: 98.2 °F (36.8 °C)   SpO2: 97%   Weight: 70.3 kg (155 lb)   Height: 165.1 cm (65\")     Estimated body mass index is 25.79 kg/m² as calculated from the following:    Height as of this encounter: 165.1 cm (65\").    Weight as of this encounter: 70.3 kg (155 lb).    BMI is >= 25 and <30. (Overweight) The following options were offered after discussion;: exercise counseling/recommendations and nutrition counseling/recommendations      Does the patient have evidence of cognitive impairment? No    Physical Exam  Constitutional:       General: She is not in acute distress.     Appearance: Normal appearance. She is well-developed. She is not diaphoretic.      Comments: Accompanied by her sister. Bright and in good spirits.  Sitting in a wheelchair.  No apparent distress. No pallor, jaundice, diaphoresis, or cyanosis.     HENT:      Head: Atraumatic.      Right Ear: Tympanic membrane, ear canal and external ear normal.      Left Ear: Tympanic membrane, ear canal and external ear normal.   Eyes:      Conjunctiva/sclera: Conjunctivae normal.   Neck:      Thyroid: No thyroid mass or thyromegaly.      Vascular: No carotid bruit or JVD.      Trachea: Trachea normal. No tracheal deviation.   Cardiovascular:      Rate and Rhythm: Normal rate and regular rhythm.      Heart sounds: Normal heart sounds, S1 normal and S2 normal. No murmur heard.    No gallop.   Pulmonary:      Effort: Pulmonary " effort is normal.      Breath sounds: Normal breath sounds.   Chest:   Breasts:      Right: No supraclavicular adenopathy.      Left: No supraclavicular adenopathy.       Abdominal:      General: Bowel sounds are normal. There is no distension or abdominal bruit.      Palpations: Abdomen is soft. There is no hepatomegaly, splenomegaly or mass.      Tenderness: There is no abdominal tenderness.      Hernia: No hernia is present.   Musculoskeletal:      Right lower leg: No edema.      Left Lower Extremity: Left leg is amputated above knee.   Lymphadenopathy:      Head:      Right side of head: No submental, submandibular, tonsillar, preauricular, posterior auricular or occipital adenopathy.      Left side of head: No submental, submandibular, tonsillar, preauricular, posterior auricular or occipital adenopathy.      Cervical: No cervical adenopathy.      Upper Body:      Right upper body: No supraclavicular adenopathy.      Left upper body: No supraclavicular adenopathy.   Skin:     General: Skin is warm.      Coloration: Skin is not cyanotic, jaundiced or pale.      Findings: No rash.      Nails: There is no clubbing.   Neurological:      Mental Status: She is alert and oriented to person, place, and time.      Cranial Nerves: No cranial nerve deficit.      Sensory: Sensory deficit (decreased vibration sense right foot) present.      Motor: No tremor.      Coordination: Coordination normal.   Psychiatric:         Attention and Perception: Attention normal.         Mood and Affect: Mood normal.         Speech: Speech normal.         Behavior: Behavior normal.         Thought Content: Thought content normal.       HEALTH RISK ASSESSMENT    Smoking Status:  Social History     Tobacco Use   Smoking Status Current Every Day Smoker   • Packs/day: 1.00   • Years: 11.00   • Pack years: 11.00   • Types: Cigarettes   Smokeless Tobacco Never Used   Tobacco Comment    Encouraged smoking cessation     Alcohol Consumption:  Social  History     Substance and Sexual Activity   Alcohol Use No     Fall Risk Screen:  STEADI Fall Risk Assessment was completed, and patient is at LOW risk for falls.Assessment completed on:7/22/2022    Depression Screening:  PHQ-2/PHQ-9 Depression Screening 7/22/2022   Retired Total Score -   Little Interest or Pleasure in Doing Things 0-->not at all   Feeling Down, Depressed or Hopeless 0-->not at all   PHQ-9: Brief Depression Severity Measure Score 0       Health Habits and Functional and Cognitive Screening:  Functional & Cognitive Status 7/22/2022   Do you have difficulty preparing food and eating? No   Do you have difficulty bathing yourself, getting dressed or grooming yourself? No   Do you have difficulty using the toilet? No   Do you have difficulty moving around from place to place? No   Do you have trouble with steps or getting out of a bed or a chair? No   Current Diet Well Balanced Diet   Dental Exam Up to date   Eye Exam Up to date   Exercise (times per week) 0 times per week   Current Exercises Include No Regular Exercise   Do you need help using the phone?  No   Are you deaf or do you have serious difficulty hearing?  No   Do you need help with transportation? No   Do you need help shopping? Yes   Do you need help preparing meals?  Yes   Do you need help with housework?  Yes   Do you need help with laundry? Yes   Do you need help taking your medications? No   Do you need help managing money? No   Do you ever drive or ride in a car without wearing a seat belt? No   Have you felt unusual stress, anger or loneliness in the last month? No   Who do you live with? Child   If you need help, do you have trouble finding someone available to you? No   Have you been bothered in the last four weeks by sexual problems? No   Do you have difficulty concentrating, remembering or making decisions? No     Age-appropriate Screening Schedule:  Refer to the list below for future screening recommendations based on patient's  age, sex and/or medical conditions. Orders for these recommended tests are listed in the plan section. The patient has been provided with a written plan.    Health Maintenance   Topic Date Due   • ZOSTER VACCINE (1 of 2) Never done   • URINE MICROALBUMIN  01/10/2018   • DIABETIC FOOT EXAM  08/09/2018   • PAP SMEAR  03/02/2021   • DXA SCAN  04/09/2023 (Originally 1965)   • INFLUENZA VACCINE  10/01/2022   • HEMOGLOBIN A1C  10/08/2022   • LIPID PANEL  04/08/2023   • DIABETIC EYE EXAM  06/16/2023   • TDAP/TD VACCINES (3 - Td or Tdap) 10/04/2025   • MAMMOGRAM  Discontinued        Assessment & Plan   CMS Preventative Services Quick Reference  Risk Factors Identified During Encounter  Chronic Pain   Depression/Dysphoria  Fall Risk-High or Moderate  Immunizations Discussed/Encouraged (specific Immunizations; Hepatitis B Vaccine/Series, Influenza, Prevnar 20 (Pneumococcal 20-valent conjugate), Shingrix and COVID19  Inactivity/Sedentary  Tobacco Use/Dependance (use dotphrase .tobaccocessation for documentation)  The above risks/problems have been discussed with the patient.  Follow up actions/plans if indicated are seen below in the Assessment/Plan Section.  Pertinent information has been shared with the patient in the After Visit Summary.    Diagnoses and all orders for this visit:    1. Essential hypertension    Hypertension: at goal. Evidence of target organ damage: chronic kidney disease.  Encouraged to continue to work on diet and exercise plan.   Continue current medication  -     potassium chloride (K-DUR,KLOR-CON) 10 MEQ CR tablet; Take 1 tablet by mouth Daily.  Dispense: 30 tablet; Refill: 5    2. Mixed hyperlipidemia  As above.   Continue current medication.      3. Type 2 diabetes mellitus with peripheral neuropathy (HCC)  Diabetes mellitus Type II, under better control.   Encouraged to continue to pursue ADA diet  Encouraged aerobic exercise.  Agreed on the addition of semaglutide 0.25 weekly  Gabapentin will  be titrated to 300 3 times daily  Patient is doing frequent glucose checks and insulin injections and would benefit from a CGM.  We will try to obtain insurance authorization  -     Semaglutide,0.25 or 0.5MG/DOS, (Ozempic, 0.25 or 0.5 MG/DOSE,) 2 MG/1.5ML solution pen-injector; Inject 0.25 mg under the skin into the appropriate area as directed 1 (One) Time Per Week.  Dispense: 1 pen; Refill: 5  -     Insulin Pen Needle 32G X 4 MM misc; 1 each 4 (Four) Times a Day.  Dispense: 120 each; Refill: 5  -     gabapentin (NEURONTIN) 300 MG capsule; Take 1 capsule by mouth 3 (Three) Times a Day. 1 tablet bid and 2 at bedtime  Dispense: 90 capsule; Refill: 2    4. Gastroesophageal reflux disease without esophagitis   Symptoms are currently well controlled.  Encouraged to report if this should change.  Continue current medication    5. Chronic renal failure, stage 2 (mild)  Reminded to avoid any NSAIDs prescription or OTC  Will continue to monitor    6. Healthcare maintenance  Patient remains uninterested in COVID-19 vaccination  Recommended a Prevnar 20 with her flu shot this fall  She remains uninterested in breast, colon, or lung cancer screening but will consider  Will discuss hepatitis B vaccination and Shingrix at her return    7. Recurrent major depressive disorder, in partial remission (HCC)  Doing well at present.  Supportive therapy.   Encouraged to report if any worse or if any new symptoms or concerns.    8. Hx of AKA (above knee amputation), left (HCC)  She is post left transfemoral amputation and has the potential for K2 ambulation.  She is very motivated to resume walking and a transfemoral prosthesis is indicated to allow her to return to bipedal ambulation.  Comprehensive physical therapy will be involved to expedite her return to independence in her former activities of daily living    9. Chronic pain syndrome  As above.   -     gabapentin (NEURONTIN) 300 MG capsule; Take 1 capsule by mouth 3 (Three) Times a  Day. 1 tablet bid and 2 at bedtime  Dispense: 90 capsule; Refill: 2    10. Current smoker  Lengthy discussion regarding the potential sequela of continued tobacco use and the options with respect to cessation.  Patient uninterested in pursuing at present but will consider.    Follow Up:   Return in about 11 weeks (around 10/7/2022).     An After Visit Summary and PPPS were made available to the patient.

## 2022-07-22 NOTE — PATIENT INSTRUCTIONS
"Critical care medicine: Principles of diagnosis and management in the adult (4th ed., pp. 3806-9941). Grover.\"> Kennedy's anesthesia (8th ed., pp. 232-250). Grover.\">   Advance Directive    Advance directives are legal documents that allow you to make decisions about your health care and medical treatment in case you become unable to communicate for yourself. Advance directives let your wishes be known to family, friends, and health care providers.  Discussing and writing advance directives should happen over time rather than all at once. Advance directives can be changed and updated at any time. There are different types of advance directives, such as:  Medical power of .  Living will.  Do not resuscitate (DNR) order or do not attempt resuscitation (DNAR) order.  Health care proxy and medical power of   A health care proxy is also called a health care agent. This person is appointed to make medical decisions for you when you are unable to make decisions for yourself. Generally, people ask a trusted friend or family member to act as their proxy and represent their preferences. Make sure you have an agreement with your trusted person to act as your proxy. A proxy may have to make a medical decision on your behalf if your wishes are not known.  A medical power of , also called a durable power of  for health care, is a legal document that names your health care proxy. Depending on the laws in your state, the document may need to be:  Signed.  Notarized.  Dated.  Copied.  Witnessed.  Incorporated into your medical record.  You may also want to appoint a trusted person to manage your money in the event you are unable to do so. This is called a durable power of  for finances. It is a separate legal document from the durable power of  for health care. You may choose your health care proxy or someone different to act as your agent in money matters.  If you do not appoint a " proxy, or there is a concern that the proxy is not acting in your best interest, a court may appoint a guardian to act on your behalf.  Living will  A living will is a set of instructions that state your wishes about medical care when you cannot express them yourself. Health care providers should keep a copy of your living will in your medical record. You may want to give a copy to family members or friends. To alert caregivers in case of an emergency, you can place a card in your wallet to let them know that you have a living will and where they can find it. A living will is used if you become:  Terminally ill.  Disabled.  Unable to communicate or make decisions.  The following decisions should be included in your living will:  To use or not to use life support equipment, such as dialysis machines and breathing machines (ventilators).  Whether you want a DNR or DNAR order. This tells health care providers not to use cardiopulmonary resuscitation (CPR) if breathing or heartbeat stops.  To use or not to use tube feeding.  To be given or not to be given food and fluids.  Whether you want comfort (palliative) care when the goal becomes comfort rather than a cure.  Whether you want to donate your organs and tissues.  A living will does not give instructions for distributing your money and property if you should pass away.  DNR or DNAR  A DNR or DNAR order is a request not to have CPR in the event that your heart stops beating or you stop breathing. If a DNR or DNAR order has not been made and shared, a health care provider will try to help any patient whose heart has stopped or who has stopped breathing. If you plan to have surgery, talk with your health care provider about how your DNR or DNAR order will be followed if problems occur.  What if I do not have an advance directive?  Some states assign family decision makers to act on your behalf if you do not have an advance directive. Each state has its own laws about  advance directives. You may want to check with your health care provider, , or state representative about the laws in your state.  Summary  Advance directives are legal documents that allow you to make decisions about your health care and medical treatment in case you become unable to communicate for yourself.  The process of discussing and writing advance directives should happen over time. You can change and update advance directives at any time.  Advance directives may include a medical power of , a living will, and a DNR or DNAR order.  This information is not intended to replace advice given to you by your health care provider. Make sure you discuss any questions you have with your health care provider.  Document Revised: 09/21/2021 Document Reviewed: 09/21/2021  [employer] Patient Education © 2021 [employer]  Fall Prevention in the Home, Adult  Falls can cause injuries and can happen to people of all ages. There are many things you can do to make your home safe and to help prevent falls. Ask for help when making these changes.  What actions can I take to prevent falls?  General Instructions  Use good lighting in all rooms. Replace any light bulbs that burn out.  Turn on the lights in dark areas. Use night-lights.  Keep items that you use often in easy-to-reach places. Lower the shelves around your home if needed.  Set up your furniture so you have a clear path. Avoid moving your furniture around.  Do not have throw rugs or other things on the floor that can make you trip.  Avoid walking on wet floors.  If any of your floors are uneven, fix them.  Add color or contrast paint or tape to clearly jackie and help you see:  Grab bars or handrails.  First and last steps of staircases.  Where the edge of each step is.  If you use a stepladder:  Make sure that it is fully opened. Do not climb a closed stepladder.  Make sure the sides of the stepladder are locked in place.  Ask someone to hold the  stepladder while you use it.  Know where your pets are when moving through your home.  What can I do in the bathroom?         Keep the floor dry. Clean up any water on the floor right away.  Remove soap buildup in the tub or shower.  Use nonskid mats or decals on the floor of the tub or shower.  Attach bath mats securely with double-sided, nonslip rug tape.  If you need to sit down in the shower, use a plastic, nonslip stool.  Install grab bars by the toilet and in the tub and shower. Do not use towel bars as grab bars.  What can I do in the bedroom?  Make sure that you have a light by your bed that is easy to reach.  Do not use any sheets or blankets for your bed that hang to the floor.  Have a firm chair with side arms that you can use for support when you get dressed.  What can I do in the kitchen?  Clean up any spills right away.  If you need to reach something above you, use a step stool with a grab bar.  Keep electrical cords out of the way.  Do not use floor polish or wax that makes floors slippery.  What can I do with my stairs?  Do not leave any items on the stairs.  Make sure that you have a light switch at the top and the bottom of the stairs.  Make sure that there are handrails on both sides of the stairs. Fix handrails that are broken or loose.  Install nonslip stair treads on all your stairs.  Avoid having throw rugs at the top or bottom of the stairs.  Choose a carpet that does not hide the edge of the steps on the stairs.  Check carpeting to make sure that it is firmly attached to the stairs. Fix carpet that is loose or worn.  What can I do on the outside of my home?  Use bright outdoor lighting.  Fix the edges of walkways and driveways and fix any cracks.  Remove anything that might make you trip as you walk through a door, such as a raised step or threshold.  Trim any bushes or trees on paths to your home.  Check to see if handrails are loose or broken and that both sides of all steps have  handrails.  Install guardrails along the edges of any raised decks and porches.  Clear paths of anything that can make you trip, such as tools or rocks.  Have leaves, snow, or ice cleared regularly.  Use sand or salt on paths during winter.  Clean up any spills in your garage right away. This includes grease or oil spills.  What other actions can I take?  Wear shoes that:  Have a low heel. Do not wear high heels.  Have rubber bottoms.  Feel good on your feet and fit well.  Are closed at the toe. Do not wear open-toe sandals.  Use tools that help you move around if needed. These include:  Canes.  Walkers.  Scooters.  Crutches.  Review your medicines with your doctor. Some medicines can make you feel dizzy. This can increase your chance of falling.  Ask your doctor what else you can do to help prevent falls.  Where to find more information  Centers for Disease Control and Prevention, [place]: www.cdc.gov  [place]: www.[place].nih.gov  Contact a doctor if:  You are afraid of falling at home.  You feel weak, drowsy, or dizzy at home.  You fall at home.  Summary  There are many simple things that you can do to make your home safe and to help prevent falls.  Ways to make your home safe include removing things that can make you trip and installing grab bars in the bathroom.  Ask for help when making these changes in your home.  This information is not intended to replace advice given to you by your health care provider. Make sure you discuss any questions you have with your health care provider.  Document Revised: 07/21/2021 Document Reviewed: 07/21/2021  Elsevier Patient Education © 2021 [employer]  Mammogram  A mammogram is a low energy X-ray of the breasts that is done to check for abnormal changes. This procedure can screen for and detect any changes that may indicate breast cancer. Mammograms are regularly done on women. A man may have a mammogram if he has a lump or swelling in his breast. A  mammogram can also identify other changes and variations in the breast, such as:  Inflammation of the breast tissue (mastitis).  An infected area that contains a collection of pus (abscess).  A fluid-filled sac (cyst).  Fibrocystic changes. This is when breast tissue becomes denser, which can make the tissue feel rope-like or uneven under the skin.  Tumors that are not cancerous (benign).  Tell a health care provider:  About any allergies you have.  If you have breast implants.  If you have had previous breast disease, biopsy, or surgery.  If you are breastfeeding.  If you are younger than age 25.  If you have a family history of breast cancer.  Whether you are pregnant or may be pregnant.  What are the risks?  Generally, this is a safe procedure. However, problems may occur, including:  Exposure to radiation. Radiation levels are very low with this test.  The results being misinterpreted.  The need for further tests.  The inability of the mammogram to detect certain cancers.  What happens before the procedure?  Schedule your test about 1-2 weeks after your menstrual period if you are still menstruating. This is usually when your breasts are the least tender.  If you have had a mammogram done at a different facility in the past, get the mammogram X-rays or have them sent to your current exam facility. The new and old images will be compared.  Wash your breasts and underarms on the day of the test.  Do not wear deodorants, perfumes, lotions, or powders anywhere on your body on the day of the test.  Remove any jewelry from your neck.  Wear clothes that you can change into and out of easily.  What happens during the procedure?    You will undress from the waist up and put on a gown that opens in the front.  You will  front of the X-ray machine.  Each breast will be placed between two plastic or glass plates. The plates will compress your breast for a few seconds. Try to stay as relaxed as possible during the  "procedure. This does not cause any harm to your breasts and any discomfort you feel will be very brief.  X-rays will be taken from different angles of each breast.  The procedure may vary among health care providers and hospitals.  What happens after the procedure?  The mammogram will be examined by a specialist (radiologist).  You may need to repeat certain parts of the test, depending on the quality of the images. This is commonly done if the radiologist needs a better view of the breast tissue.  You may resume your normal activities.  It is up to you to get the results of your procedure. Ask your health care provider, or the department that is doing the procedure, when your results will be ready.  Summary  A mammogram is a low energy X-ray of the breasts that is done to check for abnormal changes. A man may have a mammogram if he has a lump or swelling in his breast.  If you have had a mammogram done at a different facility in the past, get the mammogram X-rays or have them sent to your current exam facility in order to compare them.  Schedule your test about 1-2 weeks after your menstrual period if you are still menstruating.  For this test, each breast will be placed between two plastic or glass plates. The plates will compress your breast for a few seconds.  Ask when your test results will be ready. Make sure you get your test results.  This information is not intended to replace advice given to you by your health care provider. Make sure you discuss any questions you have with your health care provider.  Document Revised: 08/08/2019 Document Reviewed: 08/08/2019  [employer] Patient Education © 2021 Elsevier Inc.  https://www.cancer.org/cancer/colon-rectal-cancer/causes-risks-prevention/risk-factors.html\">   Colorectal Cancer Screening    Colorectal cancer screening is a group of tests that are used to check for colorectal cancer before symptoms develop. Colorectal refers to the colon and rectum. The colon and " rectum are located at the end of the digestive tract and carry stool (feces) out of the body.  Who should have screening?  All adults who are 45-75 years old should have screening. Your health care provider may recommend screening before age 45. You will have tests every 1-10 years, depending on your results and the type of screening test. Screening recommendations for adults who are 76-85 years old vary depending on a person's health. People older than age 85 should no longer get colorectal cancer screening.  You may have screening tests starting before age 45, or more often than other people, if you have any of these risk factors:  A personal or family history of colorectal cancer or abnormal growths known as polyps in your colon.  Inflammatory bowel disease, such as ulcerative colitis or Crohn's disease.  A history of having radiation treatment to the abdomen or the area between the hip bones (pelvic area) for cancer.  A type of genetic syndrome that is passed from parent to child (hereditary), such as:  Saunders syndrome.  Familial adenomatous polyposis.  Turcot syndrome.  Peutz-Jeghers syndrome.  MUTYH-associated polyposis (MAP).  A personal history of diabetes.  Types of tests  There are several types of colorectal screening tests. You may have one or more of the following:  Guaiac-based fecal occult blood testing. For this test, a stool sample is checked for hidden (occult) blood, which could be a sign of colorectal cancer.  Fecal immunochemical test (FIT). For this test, a stool sample is checked for blood, which could be a sign of colorectal cancer.  Stool DNA test. For this test, a stool sample is checked for blood and changes in DNA that could lead to colorectal cancer.  Sigmoidoscopy. During this test, a thin, flexible tube with a camera on the end, called a sigmoidoscope, is used to examine the rectum and the lower colon.  Colonoscopy. During this test, a long, flexible tube with a camera on the end,  called a colonoscope, is used to examine the entire colon and rectum. Also, sometimes a tissue sample is taken to be looked at under a microscope (biopsy) or small polyps are removed during this test.  Virtual colonoscopy. Instead of a colonoscope, this type of colonoscopy uses a CT scan to take pictures of the colon and rectum. A CT scan is a type of X-ray that is made using computers.  What are the benefits of screening?  Screening reduces your risk for colorectal cancer and can help identify cancer at an early stage, when the cancer can be removed or treated more easily. It is common for polyps to form in the lining of the colon, especially as you age. These polyps may be cancerous or become cancerous over time. Screening can identify these polyps.  What are the risks of screening?  Generally, these are safe tests. However, problems may occur, including:  The need for more tests to confirm results from a stool sample test. Stool sample tests have fewer risks than other types of screening tests.  Being exposed to low levels of radiation, if you had a test involving X-rays. This may slightly increase your cancer risk. The benefit of detecting cancer outweighs the slight increase in risk.  Bleeding, damage to the intestine, or infection caused by a sigmoidoscopy or colonoscopy.  A reaction to medicines given during a sigmoidoscopy or colonoscopy.  Talk with your health care provider to understand your risk for colorectal cancer and to make a screening plan that is right for you.  Questions to ask your health care provider  When should I start colorectal cancer screening?  What is my risk for colorectal cancer?  How often do I need screening?  Which screening tests do I need?  How do I get my test results?  What do my results mean?  Where to find more information  Learn more about colorectal cancer screening from:  The American Cancer Society: cancer.org  National Cancer [place]: cancer.gov  Summary  Colorectal  cancer screening is a group of tests used to check for colorectal cancer before symptoms develop.  All adults who are 45-75 years old should have screening. Your health care provider may recommend screening before age 45.  You may have screening tests starting before age 45, or more often than other people, if you have certain risk factors.  Screening reduces your risk for colorectal cancer and can help identify cancer at an early stage, when the cancer can be removed or treated more easily.  Talk with your health care provider to understand your risk for colorectal cancer and to make a screening plan that is right for you.  This information is not intended to replace advice given to you by your health care provider. Make sure you discuss any questions you have with your health care provider.  Document Revised: 04/07/2021 Document Reviewed: 04/07/2021  [employer] Patient Education © 2021 [employer]  Lung Cancer Screening  A lung cancer screening is a test that checks for lung cancer. Lung cancer screening is done to look for lung cancer in its very early stages when you are not likely to have any symptoms and before it spreads beyond the lung, making it harder to treat. Finding cancer early improves the chances of successful treatment. It may save your life.  Who should have screening?  You should be screened for lung cancer if all of these apply:  You currently smoke or you have quit smoking within the past 15 years.  You are 50-80 years old. Screening may be recommended up to age 80 depending on your overall health and other factors.  You are in good general health.  You have a smoking history of 1 pack of cigarettes a day for 20 years or 2 packs a day for 10 years.  Screening may also be recommended if you are at high risk for the disease. You may be at high risk if:  You have a family history of lung cancer.  You have been exposed to asbestos or radon.  You have chronic obstructive pulmonary disease  (COPD).  How is screening done?    The recommended screening test is a low-dose computed tomography (LDCT) scan. This scan takes detailed images of the lungs. This allows a health care provider to look for abnormal cells. If you are at risk for lung cancer, it is recommended that you get screened once a year. Talk to your health care provider about the risks, benefits, and limitations of screening.  What are the benefits of screening?  Screening can find lung cancer early, before symptoms start and before it has spread outside of the lungs. The chances of curing lung cancer are greater if the cancer is diagnosed early.  What are the risks of screening?  The screening may show lung cancer when no cancer is present (false-positive result).  The screening may not find lung cancer when it is present.  The person gets exposed to radiation.  How can I lower my risk of lung cancer?  Make these lifestyle changes to lower your risk of developing lung cancer:  Do not use any products that contain nicotine or tobacco, such as cigarettes, e-cigarettes, and chewing tobacco. If you need help quitting, ask your health care provider.  Avoid secondhand smoke.  Avoid exposure to radiation.  Avoid exposure to radon gas. Have your home checked for radon regularly.  Avoid things that cause cancer (carcinogens).  Avoid living or working in places with high air pollution.  Questions to ask your health care provider  Am I eligible for lung cancer screening?  Does my health insurance cover the cost of lung cancer screening?  What happens if the lung cancer screening shows something of concern?  How soon will I have results from my lung cancer screening?  Is there anything that I need to do to prepare for my lung cancer screening?  What happens if I decide not to have lung cancer screening?  Where to find more information  Ask your health care provider about the risks and benefits of screening. More information and resources are available  from these organizations:  American Cancer Society (ACS): www.cancer.org  American Lung Association: www.lung.org  Contact a health care provider if:  You start to show symptoms of lung cancer, including:  Coughing that will not go away.  Making whistling sounds when you breathe (wheezing).  Chest pain.  Coughing up blood.  Shortness of breath.  Weight loss that cannot be explained.  Constant tiredness (fatigue).  Hoarse voice.  Summary  Lung cancer screening may find lung cancer before symptoms appear. Finding cancer early improves the chances of successful treatment. It may save your life.  The recommended screening test is a low-dose computed tomography (LDCT) scan that looks for abnormal cells in the lungs. If you are at risk for lung cancer, it is recommended that you get screened once a year.  You can make lifestyle changes to lower your risk of lung cancer.  Ask your health care provider about the risks and benefits of screening.  This information is not intended to replace advice given to you by your health care provider. Make sure you discuss any questions you have with your health care provider.  Document Revised: 05/10/2021 Document Reviewed: 12/15/2020  [employer] Patient Education © 2021 [email or web address]

## 2022-07-23 VITALS
WEIGHT: 155 LBS | HEIGHT: 65 IN | OXYGEN SATURATION: 97 % | RESPIRATION RATE: 14 BRPM | SYSTOLIC BLOOD PRESSURE: 124 MMHG | BODY MASS INDEX: 25.83 KG/M2 | DIASTOLIC BLOOD PRESSURE: 68 MMHG | HEART RATE: 94 BPM | TEMPERATURE: 98.2 F

## 2022-07-23 RX ORDER — GABAPENTIN 300 MG/1
300 CAPSULE ORAL 3 TIMES DAILY
Qty: 90 CAPSULE | Refills: 2 | Status: SHIPPED | OUTPATIENT
Start: 2022-07-23 | End: 2022-10-08 | Stop reason: SDUPTHER

## 2022-07-27 ENCOUNTER — TELEPHONE (OUTPATIENT)
Dept: FAMILY MEDICINE CLINIC | Facility: CLINIC | Age: 57
End: 2022-07-27

## 2022-07-27 DIAGNOSIS — E11.42 TYPE 2 DIABETES MELLITUS WITH PERIPHERAL NEUROPATHY: Primary | ICD-10-CM

## 2022-07-27 RX ORDER — PROCHLORPERAZINE 25 MG/1
1 SUPPOSITORY RECTAL ONCE
Qty: 1 EACH | Refills: 0 | Status: SHIPPED | OUTPATIENT
Start: 2022-07-27 | End: 2022-07-27

## 2022-07-27 RX ORDER — PROCHLORPERAZINE 25 MG/1
SUPPOSITORY RECTAL
Qty: 3 EACH | Refills: 5 | Status: SHIPPED | OUTPATIENT
Start: 2022-07-27 | End: 2023-04-04

## 2022-07-27 NOTE — TELEPHONE ENCOUNTER
----- Message from Miki Christianson MD sent at 7/27/2022  9:28 AM EDT -----  Emailed to [facility]  Thanks    ----- Message -----  From: Mary Hebert MA  Sent: 7/26/2022  10:19 AM EDT  To: Miki Christianson MD    Dexcom would be the best option. Send it to [facility] for processing.   ----- Message -----  From: Miki Christianson MD  Sent: 7/23/2022  11:52 AM EDT  To: Mary Hebert MA    Any shot at a CGM?

## 2022-07-29 RX ORDER — ONDANSETRON 8 MG/1
TABLET, ORALLY DISINTEGRATING ORAL
Qty: 30 TABLET | Refills: 5 | Status: SHIPPED | OUTPATIENT
Start: 2022-07-29 | End: 2022-10-18

## 2022-08-10 RX ORDER — HYDROXYZINE HYDROCHLORIDE 25 MG/1
TABLET, FILM COATED ORAL
Qty: 60 TABLET | Refills: 3 | Status: SHIPPED | OUTPATIENT
Start: 2022-08-10 | End: 2022-11-30

## 2022-09-09 DIAGNOSIS — K21.9 GASTROESOPHAGEAL REFLUX DISEASE WITHOUT ESOPHAGITIS: Chronic | ICD-10-CM

## 2022-09-09 RX ORDER — PANTOPRAZOLE SODIUM 40 MG/1
40 TABLET, DELAYED RELEASE ORAL DAILY
Qty: 30 TABLET | Refills: 3 | Status: SHIPPED | OUTPATIENT
Start: 2022-09-09 | End: 2023-04-04 | Stop reason: SDUPTHER

## 2022-09-22 ENCOUNTER — TELEPHONE (OUTPATIENT)
Dept: FAMILY MEDICINE CLINIC | Facility: CLINIC | Age: 57
End: 2022-09-22

## 2022-09-22 DIAGNOSIS — E55.9 VITAMIN D DEFICIENCY: ICD-10-CM

## 2022-09-22 RX ORDER — ERGOCALCIFEROL 1.25 MG/1
50000 CAPSULE ORAL
Qty: 5 CAPSULE | Refills: 5 | Status: SHIPPED | OUTPATIENT
Start: 2022-09-22 | End: 2023-04-04 | Stop reason: SDUPTHER

## 2022-09-22 RX ORDER — DIAPER,BRIEF,ADULT, DISPOSABLE
1 EACH MISCELLANEOUS 3 TIMES DAILY PRN
Qty: 90 EACH | Refills: 2 | Status: SHIPPED | OUTPATIENT
Start: 2022-09-22 | End: 2022-09-23 | Stop reason: SDUPTHER

## 2022-09-22 NOTE — TELEPHONE ENCOUNTER
Caller: Jocelyn Palomo    Relationship: Self    Best call back number:    Requested Prescriptions:   Requested Prescriptions     Pending Prescriptions Disp Refills   • vitamin D (ERGOCALCIFEROL) 1.25 MG (29623 UT) capsule capsule 5 capsule 5     Sig: Take 1 capsule by mouth Every 7 (Seven) Days.   • Incontinence Supply Disposable (Depend Underwear Large) misc 90 each 2     Si each 3 (Three) Times a Day As Needed (for urinary incontinence).        Pharmacy where request should be sent:  [facility]    Additional details provided by patient: SHE ADVISED THE DEPENDS IS ORDERED THROUGH THE HEALTH DEPT AND NEEDS TO GO THE ADDRESS: [place] AS I HAD A HARD TIME UNDERSTANDING WHERE THEY NEEDED TO GO TO      Does the patient have less than a 3 day supply:  [x] Yes  [] No    Hollis Mckeon   22 13:01 EDT

## 2022-09-22 NOTE — TELEPHONE ENCOUNTER
Caller: Jocelyn Palomo    Relationship: Self    Best call back number: [phone]   What orders are you requesting (i.e. lab or imaging): DEPENDS  In what timeframe would the patient need to come in: ASAP  Where will you receive your lab/imaging services: IN HOME  Additional notes:   THROUGH THE HEALTH DEPARTMENT      SHIP TO PATIENT AT   [address] [place] [address]  [address] [place] [address]

## 2022-09-23 RX ORDER — DIAPER,BRIEF,ADULT, DISPOSABLE
1 EACH MISCELLANEOUS 3 TIMES DAILY PRN
Qty: 90 EACH | Refills: 2 | Status: SHIPPED | OUTPATIENT
Start: 2022-09-23 | End: 2022-10-08 | Stop reason: SDUPTHER

## 2022-10-07 ENCOUNTER — OFFICE VISIT (OUTPATIENT)
Dept: FAMILY MEDICINE CLINIC | Facility: CLINIC | Age: 57
End: 2022-10-07

## 2022-10-07 DIAGNOSIS — N18.2 CHRONIC RENAL FAILURE, STAGE 2 (MILD): ICD-10-CM

## 2022-10-07 DIAGNOSIS — F33.41 RECURRENT MAJOR DEPRESSIVE DISORDER, IN PARTIAL REMISSION: ICD-10-CM

## 2022-10-07 DIAGNOSIS — E11.42 TYPE 2 DIABETES MELLITUS WITH PERIPHERAL NEUROPATHY: ICD-10-CM

## 2022-10-07 DIAGNOSIS — Z23 ENCOUNTER FOR IMMUNIZATION: ICD-10-CM

## 2022-10-07 DIAGNOSIS — K21.9 GASTROESOPHAGEAL REFLUX DISEASE WITHOUT ESOPHAGITIS: ICD-10-CM

## 2022-10-07 DIAGNOSIS — E78.2 MIXED HYPERLIPIDEMIA: ICD-10-CM

## 2022-10-07 DIAGNOSIS — Z89.612 HX OF AKA (ABOVE KNEE AMPUTATION), LEFT: ICD-10-CM

## 2022-10-07 DIAGNOSIS — Z00.00 HEALTHCARE MAINTENANCE: ICD-10-CM

## 2022-10-07 DIAGNOSIS — E78.2 MIXED HYPERLIPIDEMIA: Chronic | ICD-10-CM

## 2022-10-07 DIAGNOSIS — I10 ESSENTIAL HYPERTENSION: Primary | ICD-10-CM

## 2022-10-07 DIAGNOSIS — E11.42 TYPE 2 DIABETES MELLITUS WITH PERIPHERAL NEUROPATHY: Chronic | ICD-10-CM

## 2022-10-07 DIAGNOSIS — G89.4 CHRONIC PAIN SYNDROME: ICD-10-CM

## 2022-10-07 DIAGNOSIS — I10 ESSENTIAL HYPERTENSION: Chronic | ICD-10-CM

## 2022-10-07 DIAGNOSIS — F17.200 CURRENT SMOKER: ICD-10-CM

## 2022-10-07 LAB
EXPIRATION DATE: NORMAL
HBA1C MFR BLD: 10.4 %
Lab: NORMAL

## 2022-10-07 PROCEDURE — 99214 OFFICE O/P EST MOD 30 MIN: CPT | Performed by: GENERAL PRACTICE

## 2022-10-07 PROCEDURE — G0009 ADMIN PNEUMOCOCCAL VACCINE: HCPCS | Performed by: GENERAL PRACTICE

## 2022-10-07 PROCEDURE — G0008 ADMIN INFLUENZA VIRUS VAC: HCPCS | Performed by: GENERAL PRACTICE

## 2022-10-07 PROCEDURE — 3046F HEMOGLOBIN A1C LEVEL >9.0%: CPT | Performed by: GENERAL PRACTICE

## 2022-10-07 PROCEDURE — 90686 IIV4 VACC NO PRSV 0.5 ML IM: CPT | Performed by: GENERAL PRACTICE

## 2022-10-07 PROCEDURE — 83036 HEMOGLOBIN GLYCOSYLATED A1C: CPT | Performed by: GENERAL PRACTICE

## 2022-10-07 PROCEDURE — 90732 PPSV23 VACC 2 YRS+ SUBQ/IM: CPT | Performed by: GENERAL PRACTICE

## 2022-10-07 NOTE — PROGRESS NOTES
Subjective   Jocelyn Palomo is a 57 y.o. female.     Chief Complaint  She returns for a scheduled reassessment of multiple medical problems including type 2 diabetes mellitus complicated by left AKA, hyperlipidemia, essential hypertension, gastroesophageal reflux disease, and chronic pain    History of Present Illness     Left AKA  She underwent a left AKA approximately 1 year ago.  She was discharged to a rehabilitation facility but is now at home living relatively independently.  She has family close by that check on her regularly.  She is very motivated to start walking and expects to receive her prosthetic within the next several weeks.    Type 2 Diabetes Mellitus  She admits to paresthesias of the right foot along with intermittent visual blurring.  She continues to deny any visual loss, polydipsia, polyuria, or hypoglycemia. Evaluation to date has been: hemoglobin A1C. Current treatments: GLP agonist-semaglutide, basal insulin - levemir 50 qd and mealtime insulin - novolog 10 with meals.  She underwent an updated diabetic eye exam on 6/16/2022 with no evidence of retinopathy  Lab Results   Component Value Date    HGBA1C 10.4 10/07/2022     Hyperlipidemia  Her compliance with treatment has been fair.  She continues to work on her diet and what exercise she can tolerate. She remains on rosuvastatin and omega-3 fatty acid with no apparent side effects    Essential Hypertension  Home blood pressure readings: not doing.  She continues to deny any chest pain, palpitations, dyspnea, orthopnea, paroxysmal nocturnal dyspnea or peripheral edema. Current antihypertensive medications includes lisinopril and hydrochlorothiazide.     Chronic Pain Syndrome  She has chronic low back and joint pain as well as burning and tingling about her right foot.  She also has had some phantom pain since her left AKA.  She continues to deny any changes in her strength, gait, or bowel/bladder control.  She appears to be taking gabapentin as  prescribed and feels that it is helped considerably    The following portions of the patient's history were reviewed and updated as appropriate: allergies, current medications, past medical history, past social history and problem list.    Review of Systems   Constitutional: Positive for fatigue. Negative for appetite change, chills, fever and unexpected weight change.   HENT: Negative for congestion, ear pain, rhinorrhea, sneezing and sore throat.    Eyes: Negative for visual disturbance.   Respiratory: Negative for cough, shortness of breath and wheezing.    Cardiovascular: Negative for chest pain, palpitations and leg swelling.   Gastrointestinal: Negative for abdominal pain, blood in stool, constipation, diarrhea, nausea and vomiting.   Endocrine: Negative for polydipsia and polyuria.   Genitourinary: Negative for dysuria, frequency, hematuria and urgency.        Urinary incontinence   Musculoskeletal: Positive for arthralgias, back pain and neck pain. Negative for joint swelling and myalgias.   Skin: Negative for color change.   Neurological: Positive for numbness. Negative for weakness and headaches.   Psychiatric/Behavioral: Positive for decreased concentration and sleep disturbance. Negative for dysphoric mood and suicidal ideas. The patient is not nervous/anxious.        Objective   Physical Exam  Constitutional:       General: She is not in acute distress.     Appearance: Normal appearance. She is well-developed. She is not diaphoretic.      Comments: Accompanied by her son. Bright and in good spirits.  Sitting in a wheelchair.  No apparent distress. No pallor, jaundice, diaphoresis, or cyanosis.     HENT:      Head: Atraumatic.      Right Ear: Tympanic membrane, ear canal and external ear normal.      Left Ear: Tympanic membrane, ear canal and external ear normal.   Eyes:      Conjunctiva/sclera: Conjunctivae normal.   Neck:      Thyroid: No thyroid mass or thyromegaly.      Vascular: No carotid bruit or  JVD.      Trachea: Trachea normal. No tracheal deviation.   Cardiovascular:      Rate and Rhythm: Normal rate and regular rhythm.      Heart sounds: Normal heart sounds, S1 normal and S2 normal. No murmur heard.    No gallop.   Pulmonary:      Effort: Pulmonary effort is normal.      Breath sounds: Normal breath sounds.   Abdominal:      General: Bowel sounds are normal. There is no distension.   Musculoskeletal:      Right lower leg: No edema.      Left Lower Extremity: Left leg is amputated above knee.   Lymphadenopathy:      Head:      Right side of head: No submental, submandibular, tonsillar, preauricular, posterior auricular or occipital adenopathy.      Left side of head: No submental, submandibular, tonsillar, preauricular, posterior auricular or occipital adenopathy.      Cervical: No cervical adenopathy.      Upper Body:      Right upper body: No supraclavicular adenopathy.      Left upper body: No supraclavicular adenopathy.   Skin:     General: Skin is warm.      Coloration: Skin is not cyanotic, jaundiced or pale.      Findings: No rash.      Nails: There is no clubbing.   Neurological:      Mental Status: She is alert and oriented to person, place, and time.      Cranial Nerves: No cranial nerve deficit.      Sensory: Sensory deficit (decreased vibration sense right foot) present.      Motor: No tremor.      Coordination: Coordination normal.   Psychiatric:         Attention and Perception: Attention normal.         Mood and Affect: Mood normal.         Speech: Speech normal.         Behavior: Behavior normal.         Thought Content: Thought content normal.       Assessment & Plan   Problems Addressed this Visit        Cardiac and Vasculature    Essential hypertension   Hypertension: at goal. Evidence of target organ damage: chronic kidney disease.  Encouraged to continue to work on diet and exercise plan.   Continue current medication    Relevant Medications    lisinopril-hydrochlorothiazide  (PRINZIDE,ZESTORETIC) 20-25 MG per tablet    Mixed hyperlipidemia  As above.   Continue current medication.    Relevant Medications    rosuvastatin (CRESTOR) 40 MG tablet    icosapent ethyl (Vascepa) 1 g capsule capsule    ezetimibe (ZETIA) 10 MG tablet       Endocrine and Metabolic    Type 2 diabetes mellitus with peripheral neuropathy (HCC)  Diabetes mellitus Type II, under inadequate control.   Encouraged to continue to pursue ADA diet  Encouraged aerobic exercise.  Semaglutide will be titrated to 2.5 weekly  Will continue remain medication for now  We will discuss the potential benefits and risks of dapagliflozin at her return  Updated labs will be drawn at her return.    Relevant Medications    Semaglutide,0.25 or 0.5MG/DOS, (Ozempic, 0.25 or 0.5 MG/DOSE,) 2 MG/1.5ML solution pen-injector    insulin detemir (Levemir FlexTouch) 100 UNIT/ML injection    insulin aspart (novoLOG FLEXPEN) 100 UNIT/ML solution pen-injector sc pen    gabapentin (NEURONTIN) 300 MG capsule    Other Relevant Orders    POC Glycosylated Hemoglobin (Hb A1C) (Completed)       Gastrointestinal Abdominal     Gastroesophageal reflux disease without esophagitis       Genitourinary and Reproductive     Chronic renal failure, stage 2 (mild)  Reminded to avoid any NSAIDs prescription or OTC  Will continue to monitor       Health Encounters    Healthcare maintenance  Flu shot and Prevnar 20 administered  Reminded that she is due for Shingrix  She remains uninterested in COVID-19 vaccination    Relevant Orders    Pneumococcal Polysaccharide Vaccine 23-Valent Greater Than or Equal To 1yo Subcutaneous / IM (Completed)       Mental Health    Recurrent major depressive disorder, in partial remission (HCC)       Musculoskeletal and Injuries    Hx of AKA (above knee amputation), left (HCC)  Supportive therapy       Neuro    Chronic pain syndrome  Continue current medication  Encouraged to report if any worse or if any new symptoms or concerns.    Relevant  Medications    gabapentin (NEURONTIN) 300 MG capsule       Tobacco    Current smoker                      Diagnoses       Codes Comments    Essential hypertension    -  Primary ICD-10-CM: I10  ICD-9-CM: 401.9     Mixed hyperlipidemia     ICD-10-CM: E78.2  ICD-9-CM: 272.2     Type 2 diabetes mellitus with peripheral neuropathy (HCC)     ICD-10-CM: E11.42  ICD-9-CM: 250.60, 357.2     Gastroesophageal reflux disease without esophagitis     ICD-10-CM: K21.9  ICD-9-CM: 530.81     Chronic renal failure, stage 2 (mild)     ICD-10-CM: N18.2  ICD-9-CM: 585.2     Healthcare maintenance     ICD-10-CM: Z00.00  ICD-9-CM: V70.0     Recurrent major depressive disorder, in partial remission (HCC)     ICD-10-CM: F33.41  ICD-9-CM: 296.35     Hx of AKA (above knee amputation), left (HCC)     ICD-10-CM: Z89.612  ICD-9-CM: V49.76     Chronic pain syndrome     ICD-10-CM: G89.4  ICD-9-CM: 338.4     Current smoker     ICD-10-CM: F17.200  ICD-9-CM: 305.1     Encounter for immunization     ICD-10-CM: Z23  ICD-9-CM: V03.89     Mixed hyperlipidemia     ICD-10-CM: E78.2  ICD-9-CM: 272.2 Cardiovascular risk reduction modifications reinforced.     Essential hypertension     ICD-10-CM: I10  ICD-9-CM: 401.9 Adequately controlled    Type 2 diabetes mellitus with peripheral neuropathy (HCC)     ICD-10-CM: E11.42  ICD-9-CM: 250.60, 357.2 Reviewed recent lab results which included an A1C of over 15. Treatment options reviewed.

## 2022-10-07 NOTE — PROGRESS NOTES
Injection  Injection performed in right deltoid by Christelle Menendez MA. Patient tolerated the procedure well without complications.  10/07/22   Christelle Menendez MA

## 2022-10-08 VITALS
TEMPERATURE: 97.9 F | RESPIRATION RATE: 14 BRPM | BODY MASS INDEX: 25.79 KG/M2 | SYSTOLIC BLOOD PRESSURE: 132 MMHG | HEIGHT: 65 IN | DIASTOLIC BLOOD PRESSURE: 70 MMHG | OXYGEN SATURATION: 96 % | HEART RATE: 94 BPM

## 2022-10-08 RX ORDER — SEMAGLUTIDE 1.34 MG/ML
0.5 INJECTION, SOLUTION SUBCUTANEOUS WEEKLY
Qty: 1.5 ML | Refills: 2 | Status: SHIPPED | OUTPATIENT
Start: 2022-10-08 | End: 2023-04-04 | Stop reason: SDUPTHER

## 2022-10-08 RX ORDER — GABAPENTIN 300 MG/1
300 CAPSULE ORAL 3 TIMES DAILY
Qty: 90 CAPSULE | Refills: 2 | Status: SHIPPED | OUTPATIENT
Start: 2022-10-08 | End: 2022-10-13 | Stop reason: SDUPTHER

## 2022-10-08 RX ORDER — EZETIMIBE 10 MG/1
10 TABLET ORAL DAILY
Qty: 30 TABLET | Refills: 5 | Status: SHIPPED | OUTPATIENT
Start: 2022-10-08 | End: 2023-04-04 | Stop reason: SDUPTHER

## 2022-10-08 RX ORDER — LISINOPRIL AND HYDROCHLOROTHIAZIDE 25; 20 MG/1; MG/1
1 TABLET ORAL DAILY
Qty: 30 TABLET | Refills: 3 | Status: SHIPPED | OUTPATIENT
Start: 2022-10-08 | End: 2023-04-04 | Stop reason: SDUPTHER

## 2022-10-08 RX ORDER — ROSUVASTATIN CALCIUM 40 MG/1
40 TABLET, COATED ORAL NIGHTLY
Qty: 30 TABLET | Refills: 5 | Status: SHIPPED | OUTPATIENT
Start: 2022-10-08 | End: 2023-04-04 | Stop reason: SDUPTHER

## 2022-10-08 RX ORDER — INSULIN DETEMIR 100 [IU]/ML
50 INJECTION, SOLUTION SUBCUTANEOUS DAILY
Qty: 45 ML | Refills: 3 | Status: SHIPPED | OUTPATIENT
Start: 2022-10-08 | End: 2023-04-04

## 2022-10-08 RX ORDER — DIAPER,BRIEF,ADULT, DISPOSABLE
1 EACH MISCELLANEOUS 3 TIMES DAILY PRN
Qty: 90 EACH | Refills: 2 | Status: SHIPPED | OUTPATIENT
Start: 2022-10-08

## 2022-10-08 RX ORDER — ICOSAPENT ETHYL 1000 MG/1
2 CAPSULE ORAL 2 TIMES DAILY WITH MEALS
Qty: 120 CAPSULE | Refills: 3 | Status: SHIPPED | OUTPATIENT
Start: 2022-10-08 | End: 2023-04-04 | Stop reason: SDUPTHER

## 2022-10-13 DIAGNOSIS — E11.42 TYPE 2 DIABETES MELLITUS WITH PERIPHERAL NEUROPATHY: ICD-10-CM

## 2022-10-13 DIAGNOSIS — G89.4 CHRONIC PAIN SYNDROME: ICD-10-CM

## 2022-10-13 RX ORDER — GABAPENTIN 300 MG/1
300 CAPSULE ORAL 3 TIMES DAILY
Qty: 90 CAPSULE | Refills: 2 | Status: SHIPPED | OUTPATIENT
Start: 2022-10-13 | End: 2022-12-08

## 2022-10-13 NOTE — TELEPHONE ENCOUNTER
Caller: Jocelyn Palomo    Relationship: Self    Best call back number:      [phone]      Requested Prescriptions:   Requested Prescriptions     Pending Prescriptions Disp Refills   • gabapentin (NEURONTIN) 300 MG capsule 90 capsule 2     Sig: Take 1 capsule by mouth 3 (Three) Times a Day. 1 tablet bid and 2 at bedtime      Pharmacy where request should be sent: [address] - [phone]  - [phone]     Additional details provided by patient:     PATIENT STATED SHE IS COMPLETELY OUT OF THE MEDICATION    PATIENT STATED RECENTLY BEING SICK, SHE VOMITED EVERY TIME SHE TOOK THE MEDICATION    Does the patient have less than a 3 day supply:  [x] Yes  [] No    Hollis Lees Rep   10/13/22 08:49 EDT     DR CAROLINA

## 2022-10-18 RX ORDER — ONDANSETRON 8 MG/1
TABLET, ORALLY DISINTEGRATING ORAL
Qty: 30 TABLET | Refills: 5 | Status: SHIPPED | OUTPATIENT
Start: 2022-10-18 | End: 2022-12-15

## 2022-10-26 ENCOUNTER — TELEPHONE (OUTPATIENT)
Dept: FAMILY MEDICINE CLINIC | Facility: CLINIC | Age: 57
End: 2022-10-26

## 2022-10-26 NOTE — TELEPHONE ENCOUNTER
----- Message from Miki Christianson MD sent at 10/18/2022  8:20 AM EDT -----  K - please let her know    ----- Message -----  From: Mary Hebert MA  Sent: 10/17/2022   2:16 PM EDT  To: Miki Christianson MD    Spoke to the [facility] and they said they do not accept her insurance.     ----- Message -----  From: Miki Christianson MD  Sent: 10/8/2022   2:02 PM EDT  To: Mary Hebert MA    Needs depends through [facility] but delivered to Irina Marcus's address - I will print of another script

## 2022-11-23 ENCOUNTER — TELEPHONE (OUTPATIENT)
Dept: FAMILY MEDICINE CLINIC | Facility: CLINIC | Age: 57
End: 2022-11-23

## 2022-11-23 NOTE — TELEPHONE ENCOUNTER
Caller: Jocelyn Palomo    Relationship: Self    Best call back number: [phone]    What orders are you requesting (i.e. lab or imaging): HOME HEALTH TO HELP WITH HER LEARNING TO USE PROSTHETIC LEG AND MOBILITY     In what timeframe would the patient need to come in: AS SOON AS POSSIBLE   Where will you receive your lab/imaging services: [facility]     Additional notes: HAS HER PROSTHETIC NOW AND NEEDS HELP WITH LEARNING TO BE MOBILE AGAIN

## 2022-11-30 DIAGNOSIS — Z89.612 HX OF AKA (ABOVE KNEE AMPUTATION), LEFT: Primary | ICD-10-CM

## 2022-11-30 RX ORDER — HYDROXYZINE HYDROCHLORIDE 25 MG/1
TABLET, FILM COATED ORAL
Qty: 60 TABLET | Refills: 3 | Status: SHIPPED | OUTPATIENT
Start: 2022-11-30 | End: 2023-03-17 | Stop reason: SDUPTHER

## 2022-12-08 DIAGNOSIS — E11.42 TYPE 2 DIABETES MELLITUS WITH PERIPHERAL NEUROPATHY: ICD-10-CM

## 2022-12-08 DIAGNOSIS — G89.4 CHRONIC PAIN SYNDROME: ICD-10-CM

## 2022-12-08 RX ORDER — GABAPENTIN 300 MG/1
CAPSULE ORAL
Qty: 90 CAPSULE | Refills: 1 | Status: SHIPPED | OUTPATIENT
Start: 2022-12-08 | End: 2022-12-30

## 2022-12-15 RX ORDER — ONDANSETRON 8 MG/1
TABLET, ORALLY DISINTEGRATING ORAL
Qty: 30 TABLET | Refills: 5 | Status: SHIPPED | OUTPATIENT
Start: 2022-12-15 | End: 2023-02-15

## 2022-12-29 DIAGNOSIS — E11.42 TYPE 2 DIABETES MELLITUS WITH PERIPHERAL NEUROPATHY: ICD-10-CM

## 2022-12-29 DIAGNOSIS — G89.4 CHRONIC PAIN SYNDROME: ICD-10-CM

## 2022-12-30 RX ORDER — GABAPENTIN 300 MG/1
CAPSULE ORAL
Qty: 90 CAPSULE | Refills: 2 | Status: SHIPPED | OUTPATIENT
Start: 2022-12-30 | End: 2023-04-04

## 2023-02-15 RX ORDER — ONDANSETRON 8 MG/1
TABLET, ORALLY DISINTEGRATING ORAL
Qty: 30 TABLET | Refills: 5 | Status: SHIPPED | OUTPATIENT
Start: 2023-02-15

## 2023-03-17 RX ORDER — HYDROXYZINE HYDROCHLORIDE 25 MG/1
25 TABLET, FILM COATED ORAL 2 TIMES DAILY
Qty: 60 TABLET | Refills: 5 | Status: SHIPPED | OUTPATIENT
Start: 2023-03-17

## 2023-03-17 NOTE — TELEPHONE ENCOUNTER
Caller: Jocelyn Palomo    Relationship: Self    Best call back number: [phone]    Requested Prescriptions:   Requested Prescriptions     Pending Prescriptions Disp Refills   • hydrOXYzine (ATARAX) 25 MG tablet 60 tablet 3     Sig: Take 1 tablet by mouth 2 (Two) Times a Day.        Pharmacy where request should be sent: [address] - [phone]  - [phone]     Additional details provided by patient: PLEASE REFILL. OUT OF MEDICATION, HAS AN APPOINTMENT TO HAVE LABS AND PHYSICAL SCHEDULED    Does the patient have less than a 3 day supply:  [x] Yes  [] No    Would you like a call back once the refill request has been completed: [] Yes [] No    If the office needs to give you a call back, can they leave a voicemail: [] Yes [] No    Hollis Mendes Rep   03/17/23 09:00 EDT

## 2023-04-04 ENCOUNTER — OFFICE VISIT (OUTPATIENT)
Dept: FAMILY MEDICINE CLINIC | Facility: CLINIC | Age: 58
End: 2023-04-04
Payer: MEDICAID

## 2023-04-04 VITALS
SYSTOLIC BLOOD PRESSURE: 102 MMHG | HEART RATE: 96 BPM | BODY MASS INDEX: 25.79 KG/M2 | OXYGEN SATURATION: 97 % | TEMPERATURE: 97.5 F | DIASTOLIC BLOOD PRESSURE: 70 MMHG | HEIGHT: 65 IN

## 2023-04-04 DIAGNOSIS — I10 ESSENTIAL HYPERTENSION: Chronic | ICD-10-CM

## 2023-04-04 DIAGNOSIS — K21.9 GASTROESOPHAGEAL REFLUX DISEASE WITHOUT ESOPHAGITIS: Chronic | ICD-10-CM

## 2023-04-04 DIAGNOSIS — E55.9 VITAMIN D DEFICIENCY: ICD-10-CM

## 2023-04-04 DIAGNOSIS — Z79.899 LONG TERM CURRENT USE OF THERAPEUTIC DRUG: ICD-10-CM

## 2023-04-04 DIAGNOSIS — Z12.11 COLON CANCER SCREENING: ICD-10-CM

## 2023-04-04 DIAGNOSIS — E78.2 MIXED HYPERLIPIDEMIA: Chronic | ICD-10-CM

## 2023-04-04 DIAGNOSIS — E11.42 TYPE 2 DIABETES MELLITUS WITH PERIPHERAL NEUROPATHY: Primary | Chronic | ICD-10-CM

## 2023-04-04 PROCEDURE — 80053 COMPREHEN METABOLIC PANEL: CPT | Performed by: PHYSICIAN ASSISTANT

## 2023-04-04 PROCEDURE — 1160F RVW MEDS BY RX/DR IN RCRD: CPT | Performed by: PHYSICIAN ASSISTANT

## 2023-04-04 PROCEDURE — 82306 VITAMIN D 25 HYDROXY: CPT | Performed by: PHYSICIAN ASSISTANT

## 2023-04-04 PROCEDURE — 3074F SYST BP LT 130 MM HG: CPT | Performed by: PHYSICIAN ASSISTANT

## 2023-04-04 PROCEDURE — 80061 LIPID PANEL: CPT | Performed by: PHYSICIAN ASSISTANT

## 2023-04-04 PROCEDURE — 3078F DIAST BP <80 MM HG: CPT | Performed by: PHYSICIAN ASSISTANT

## 2023-04-04 PROCEDURE — 99214 OFFICE O/P EST MOD 30 MIN: CPT | Performed by: PHYSICIAN ASSISTANT

## 2023-04-04 PROCEDURE — 1159F MED LIST DOCD IN RCRD: CPT | Performed by: PHYSICIAN ASSISTANT

## 2023-04-04 PROCEDURE — 84443 ASSAY THYROID STIM HORMONE: CPT | Performed by: PHYSICIAN ASSISTANT

## 2023-04-04 PROCEDURE — 83036 HEMOGLOBIN GLYCOSYLATED A1C: CPT | Performed by: PHYSICIAN ASSISTANT

## 2023-04-04 RX ORDER — EZETIMIBE 10 MG/1
10 TABLET ORAL DAILY
Qty: 30 TABLET | Refills: 5 | Status: SHIPPED | OUTPATIENT
Start: 2023-04-04

## 2023-04-04 RX ORDER — GABAPENTIN 600 MG/1
600 TABLET ORAL 3 TIMES DAILY
Qty: 90 TABLET | Refills: 2 | Status: SHIPPED | OUTPATIENT
Start: 2023-04-04

## 2023-04-04 RX ORDER — ERGOCALCIFEROL 1.25 MG/1
50000 CAPSULE ORAL
Qty: 5 CAPSULE | Refills: 5 | Status: SHIPPED | OUTPATIENT
Start: 2023-04-04

## 2023-04-04 RX ORDER — SEMAGLUTIDE 1.34 MG/ML
0.5 INJECTION, SOLUTION SUBCUTANEOUS WEEKLY
Qty: 1.5 ML | Refills: 2 | Status: SHIPPED | OUTPATIENT
Start: 2023-04-04

## 2023-04-04 RX ORDER — PANTOPRAZOLE SODIUM 40 MG/1
40 TABLET, DELAYED RELEASE ORAL DAILY
Qty: 30 TABLET | Refills: 3 | Status: SHIPPED | OUTPATIENT
Start: 2023-04-04

## 2023-04-04 RX ORDER — INSULIN LISPRO 100 [IU]/ML
INJECTION, SOLUTION INTRAVENOUS; SUBCUTANEOUS
COMMUNITY
End: 2023-04-04 | Stop reason: SDUPTHER

## 2023-04-04 RX ORDER — POTASSIUM CHLORIDE 750 MG/1
10 TABLET, EXTENDED RELEASE ORAL DAILY
Qty: 30 TABLET | Refills: 5 | Status: SHIPPED | OUTPATIENT
Start: 2023-04-04

## 2023-04-04 RX ORDER — ROSUVASTATIN CALCIUM 40 MG/1
40 TABLET, COATED ORAL NIGHTLY
Qty: 30 TABLET | Refills: 5 | Status: SHIPPED | OUTPATIENT
Start: 2023-04-04

## 2023-04-04 RX ORDER — LISINOPRIL AND HYDROCHLOROTHIAZIDE 25; 20 MG/1; MG/1
1 TABLET ORAL DAILY
Qty: 30 TABLET | Refills: 5 | Status: SHIPPED | OUTPATIENT
Start: 2023-04-04

## 2023-04-04 RX ORDER — ICOSAPENT ETHYL 1000 MG/1
2 CAPSULE ORAL 2 TIMES DAILY WITH MEALS
Qty: 120 CAPSULE | Refills: 5 | Status: SHIPPED | OUTPATIENT
Start: 2023-04-04

## 2023-04-04 RX ORDER — INSULIN LISPRO 100 [IU]/ML
6 INJECTION, SOLUTION INTRAVENOUS; SUBCUTANEOUS
Qty: 3 ML | Refills: 3 | Status: SHIPPED | OUTPATIENT
Start: 2023-04-04

## 2023-04-04 NOTE — PROGRESS NOTES
"Subjective   Jocelyn Palomo is a 57 y.o. female.       Chief Complaint -   diabetes    History of Present Illness -    ROS    Diabetes-  Stable with patient reporting home blood glucose staying less than 200 consistently with the use of Ozempic 0.25 mg weekly and Humalog 6 units before meals 3 times daily.  Patient states she has been eating a lower carbohydrate diet.    Diabetic peripheral neuropathy-not at goal with gabapentin 300 mg 3 times daily.  Patient does have left below-knee amputation states she does have significant moderate burning phantom pain.    Hyperlipidemia-  Stable with Zetia and Vascepa    Hypertension-  Controlled with lisinopril HCTZ    Gastroesophageal reflux disease-stable with pantoprazole    Vitamin D deficiency-stable with vitamin D supplementation    The following portions of the patient's history were reviewed and updated as appropriate: allergies, current medications, past family history, past medical history, past social history, past surgical history and problem list.    Review of Systems    Objective  Vital signs:  /70   Pulse 96   Temp 97.5 °F (36.4 °C) (Temporal)   Ht 165.1 cm (65\")   SpO2 97%   BMI 25.79 kg/m²     Physical Exam  Vitals and nursing note reviewed.   Constitutional:       Appearance: Normal appearance. She is well-developed.   Eyes:      Extraocular Movements: Extraocular movements intact.      Conjunctiva/sclera: Conjunctivae normal.   Cardiovascular:      Rate and Rhythm: Normal rate and regular rhythm.      Heart sounds: Normal heart sounds. No murmur heard.  Pulmonary:      Effort: Pulmonary effort is normal. No respiratory distress.      Breath sounds: Normal breath sounds. No wheezing.   Musculoskeletal:         General: No tenderness.      Comments: Patient is in wheelchair today.  Left above-knee amputation noted   Skin:     General: Skin is warm and dry.      Findings: No rash.   Neurological:      Mental Status: She is alert and oriented to " person, place, and time.   Psychiatric:         Mood and Affect: Mood normal.         Behavior: Behavior normal.         Thought Content: Thought content normal.         The following data was reviewed by: SERG Topete on 04/04/2023:      Lab Results   Component Value Date    GLUCOSE 153 (H) 04/08/2022    BUN 18 04/08/2022    CREATININE 1.04 (H) 04/08/2022    EGFRRESULT 63.6 04/08/2022    EGFR 155 02/25/2014    BCR 17.3 04/08/2022    K 4.7 04/08/2022    CO2 27.6 04/08/2022    CALCIUM 9.5 04/08/2022    PROTENTOTREF 7.5 04/08/2022    ALBUMIN 3.80 04/08/2022    BILITOT 0.4 04/08/2022    AST 11 04/08/2022    ALT 16 04/08/2022     Lab Results   Component Value Date    CHOL 91 11/29/2021    CHOL 209 (H) 03/02/2018    CHOL 231 (H) 08/08/2017    CHLPL 255 (H) 04/08/2022    CHLPL 261 (H) 03/04/2021    CHLPL 438 (H) 09/16/2020     Lab Results   Component Value Date    TRIG 335 (H) 04/08/2022    TRIG 141 11/29/2021    TRIG 603 (H) 03/04/2021     Lab Results   Component Value Date    HDL 32 (L) 04/08/2022    HDL 28 (L) 11/29/2021    HDL 33 (L) 03/04/2021     Lab Results   Component Value Date     (H) 04/08/2022    LDL 38 11/29/2021     (H) 03/04/2021                 Most Recent A1C    HGBA1C Most Recent 10/7/22   Hemoglobin A1C 10.4                  Assessment & Plan     Diagnoses and all orders for this visit:    1. Type 2 diabetes mellitus with peripheral neuropathy (Primary)  Comments:  Increase gabapentin 600 mg 3 times daily  Ordered [facility] for physical therapy  Continue Ozempic and mealtime insulin  Advise low-carb diet  Orders:  -     gabapentin (NEURONTIN) 600 MG tablet; Take 1 tablet by mouth 3 (Three) Times a Day.  Dispense: 90 tablet; Refill: 2  -     Insulin Lispro (humaLOG) 100 UNIT/ML injection; Inject 6 Units under the skin into the appropriate area as directed 3 (Three) Times a Day Before Meals.  Dispense: 3 mL; Refill: 3  -     Insulin Pen Needle 32G X 4 MM misc; 1 each 4 (Four)  Times a Day.  Dispense: 120 each; Refill: 5  -     Semaglutide,0.25 or 0.5MG/DOS, (Ozempic, 0.25 or 0.5 MG/DOSE,) 2 MG/1.5ML solution pen-injector; Inject 0.5 mg under the skin into the appropriate area as directed 1 (One) Time Per Week.  Dispense: 1.5 mL; Refill: 2  -     Hemoglobin A1c  -     MicroAlbumin, Urine, Random - Urine, Clean Catch    2. Mixed hyperlipidemia  Comments:  Cardiovascular risk reduction modifications reinforced.   Continue Zetia and Vascepa  Advised low-cholesterol diet  Orders:  -     ezetimibe (ZETIA) 10 MG tablet; Take 1 tablet by mouth Daily.  Dispense: 30 tablet; Refill: 5  -     icosapent ethyl (Vascepa) 1 g capsule capsule; Take 2 g by mouth 2 (Two) Times a Day With Meals.  Dispense: 120 capsule; Refill: 5  -     rosuvastatin (CRESTOR) 40 MG tablet; Take 1 tablet by mouth Every Night.  Dispense: 30 tablet; Refill: 5  -     Comprehensive Metabolic Panel  -     Lipid Panel    3. Essential hypertension  Comments:  Continue lisinopril HCTZ  Orders:  -     lisinopril-hydrochlorothiazide (PRINZIDE,ZESTORETIC) 20-25 MG per tablet; Take 1 tablet by mouth Daily.  Dispense: 30 tablet; Refill: 5  -     potassium chloride (K-DUR,KLOR-CON) 10 MEQ CR tablet; Take 1 tablet by mouth Daily.  Dispense: 30 tablet; Refill: 5  -     TSH    4. Gastroesophageal reflux disease without esophagitis  Comments:  Continue pantoprazole  Advised to avoid known trigger foods  Orders:  -     pantoprazole (PROTONIX) 40 MG EC tablet; Take 1 tablet by mouth Daily.  Dispense: 30 tablet; Refill: 3  -     Comprehensive Metabolic Panel    5. Vitamin D deficiency  Comments:  Continue Vit D weekly   Orders:  -     vitamin D (ERGOCALCIFEROL) 1.25 MG (63257 UT) capsule capsule; Take 1 capsule by mouth Every 7 (Seven) Days.  Dispense: 5 capsule; Refill: 5  -     Vitamin D,25-Hydroxy    6. Long term current use of therapeutic drug  -     Urine Drug Screen - Urine, Clean Catch; Future    7. Colon cancer screening  -     Cologuard -  Stool, Per Rectum; Future            Patient was given instructions and counseling regarding his condition or for health maintenance advice. Please see specific information pulled into the AVS if appropriate      This document has been electronically signed by:  Rashmi Mahan PA-C

## 2023-04-04 NOTE — PATIENT INSTRUCTIONS
Fall Prevention in the Home, Adult  Falls can cause injuries and can happen to people of all ages. There are many things you can do to make your home safe and to help prevent falls. Ask for help when making these changes.  What actions can I take to prevent falls?  General Instructions  Use good lighting in all rooms. Replace any light bulbs that burn out.  Turn on the lights in dark areas. Use night-lights.  Keep items that you use often in easy-to-reach places. Lower the shelves around your home if needed.  Set up your furniture so you have a clear path. Avoid moving your furniture around.  Do not have throw rugs or other things on the floor that can make you trip.  Avoid walking on wet floors.  If any of your floors are uneven, fix them.  Add color or contrast paint or tape to clearly jackie and help you see:  Grab bars or handrails.  First and last steps of staircases.  Where the edge of each step is.  If you use a stepladder:  Make sure that it is fully opened. Do not climb a closed stepladder.  Make sure the sides of the stepladder are locked in place.  Ask someone to hold the stepladder while you use it.  Know where your pets are when moving through your home.  What can I do in the bathroom?     Keep the floor dry. Clean up any water on the floor right away.  Remove soap buildup in the tub or shower.  Use nonskid mats or decals on the floor of the tub or shower.  Attach bath mats securely with double-sided, nonslip rug tape.  If you need to sit down in the shower, use a plastic, nonslip stool.  Install grab bars by the toilet and in the tub and shower. Do not use towel bars as grab bars.  What can I do in the bedroom?  Make sure that you have a light by your bed that is easy to reach.  Do not use any sheets or blankets for your bed that hang to the floor.  Have a firm chair with side arms that you can use for support when you get dressed.  What can I do in the kitchen?  Clean up any spills right away.  If you  need to reach something above you, use a step stool with a grab bar.  Keep electrical cords out of the way.  Do not use floor polish or wax that makes floors slippery.  What can I do with my stairs?  Do not leave any items on the stairs.  Make sure that you have a light switch at the top and the bottom of the stairs.  Make sure that there are handrails on both sides of the stairs. Fix handrails that are broken or loose.  Install nonslip stair treads on all your stairs.  Avoid having throw rugs at the top or bottom of the stairs.  Choose a carpet that does not hide the edge of the steps on the stairs.  Check carpeting to make sure that it is firmly attached to the stairs. Fix carpet that is loose or worn.  What can I do on the outside of my home?  Use bright outdoor lighting.  Fix the edges of walkways and driveways and fix any cracks.  Remove anything that might make you trip as you walk through a door, such as a raised step or threshold.  Trim any bushes or trees on paths to your home.  Check to see if handrails are loose or broken and that both sides of all steps have handrails.  Install guardrails along the edges of any raised decks and porches.  Clear paths of anything that can make you trip, such as tools or rocks.  Have leaves, snow, or ice cleared regularly.  Use sand or salt on paths during winter.  Clean up any spills in your garage right away. This includes grease or oil spills.  What other actions can I take?  Wear shoes that:  Have a low heel. Do not wear high heels.  Have rubber bottoms.  Feel good on your feet and fit well.  Are closed at the toe. Do not wear open-toe sandals.  Use tools that help you move around if needed. These include:  Canes.  Walkers.  Scooters.  Crutches.  Review your medicines with your doctor. Some medicines can make you feel dizzy. This can increase your chance of falling.  Ask your doctor what else you can do to help prevent falls.  Where to find more information  Centers for  Disease Control and Prevention, [employer]: www.cdc.gov  [employer]: www.[place].nih.gov  Contact a doctor if:  You are afraid of falling at home.  You feel weak, drowsy, or dizzy at home.  You fall at home.  Summary  There are many simple things that you can do to make your home safe and to help prevent falls.  Ways to make your home safe include removing things that can make you trip and installing grab bars in the bathroom.  Ask for help when making these changes in your home.  This information is not intended to replace advice given to you by your health care provider. Make sure you discuss any questions you have with your health care provider.  Document Revised: 09/19/2022 Document Reviewed: 07/21/2021  Elsevier Patient Education © 2022 [employer]

## 2023-04-05 LAB
25(OH)D3 SERPL-MCNC: 32.7 NG/ML (ref 30–100)
ALBUMIN SERPL-MCNC: 3.2 G/DL (ref 3.5–5.2)
ALBUMIN/GLOB SERPL: 0.9 G/DL
ALP SERPL-CCNC: 196 U/L (ref 39–117)
ALT SERPL W P-5'-P-CCNC: 22 U/L (ref 1–33)
ANION GAP SERPL CALCULATED.3IONS-SCNC: 8 MMOL/L (ref 5–15)
AST SERPL-CCNC: 16 U/L (ref 1–32)
BILIRUB SERPL-MCNC: 0.2 MG/DL (ref 0–1.2)
BUN SERPL-MCNC: 19 MG/DL (ref 6–20)
BUN/CREAT SERPL: 12.2 (ref 7–25)
CALCIUM SPEC-SCNC: 8.6 MG/DL (ref 8.6–10.5)
CHLORIDE SERPL-SCNC: 103 MMOL/L (ref 98–107)
CHOLEST SERPL-MCNC: 178 MG/DL (ref 0–200)
CO2 SERPL-SCNC: 26 MMOL/L (ref 22–29)
CREAT SERPL-MCNC: 1.56 MG/DL (ref 0.57–1)
EGFRCR SERPLBLD CKD-EPI 2021: 38.9 ML/MIN/1.73
GLOBULIN UR ELPH-MCNC: 3.7 GM/DL
GLUCOSE SERPL-MCNC: 261 MG/DL (ref 65–99)
HBA1C MFR BLD: 8.3 % (ref 4.8–5.6)
HDLC SERPL-MCNC: 29 MG/DL (ref 40–60)
LDLC SERPL CALC-MCNC: 108 MG/DL (ref 0–100)
LDLC/HDLC SERPL: 3.5 {RATIO}
POTASSIUM SERPL-SCNC: 4.6 MMOL/L (ref 3.5–5.2)
PROT SERPL-MCNC: 6.9 G/DL (ref 6–8.5)
SODIUM SERPL-SCNC: 137 MMOL/L (ref 136–145)
TRIGL SERPL-MCNC: 238 MG/DL (ref 0–150)
TSH SERPL DL<=0.05 MIU/L-ACNC: 1.13 UIU/ML (ref 0.27–4.2)
VLDLC SERPL-MCNC: 41 MG/DL (ref 5–40)

## 2023-04-06 ENCOUNTER — TELEPHONE (OUTPATIENT)
Dept: FAMILY MEDICINE CLINIC | Facility: CLINIC | Age: 58
End: 2023-04-06

## 2023-04-06 DIAGNOSIS — R53.81 DEBILITY: Primary | ICD-10-CM

## 2023-04-06 DIAGNOSIS — E11.43 TYPE 2 DIABETES MELLITUS WITH DIABETIC AUTONOMIC NEUROPATHY, WITH LONG-TERM CURRENT USE OF INSULIN: Primary | ICD-10-CM

## 2023-04-06 DIAGNOSIS — Z79.4 TYPE 2 DIABETES MELLITUS WITH DIABETIC AUTONOMIC NEUROPATHY, WITH LONG-TERM CURRENT USE OF INSULIN: Primary | ICD-10-CM

## 2023-04-06 NOTE — TELEPHONE ENCOUNTER
Caller: Jocelyn Palomo    Relationship: Self    Best call back number:  [phone]    What test was performed: BLOOD TEST     When was the test performed:  4-4-23    Additional notes: PLEASE CALL WITH RESULTS

## 2023-04-10 RX ORDER — ONDANSETRON 8 MG/1
TABLET, ORALLY DISINTEGRATING ORAL
Qty: 30 TABLET | Refills: 5 | OUTPATIENT
Start: 2023-04-10

## 2023-04-11 RX ORDER — ONDANSETRON 8 MG/1
TABLET, ORALLY DISINTEGRATING ORAL
Qty: 30 TABLET | Refills: 5 | Status: CANCELLED | OUTPATIENT
Start: 2023-04-11

## 2023-04-11 RX ORDER — ONDANSETRON 8 MG/1
TABLET, ORALLY DISINTEGRATING ORAL
Qty: 30 TABLET | Refills: 5 | Status: SHIPPED | OUTPATIENT
Start: 2023-04-11

## 2023-04-11 NOTE — TELEPHONE ENCOUNTER
Incoming Refill Request      Medication requested (name and dose): ondansetron ODT (ZOFRAN-ODT) 8 MG disintegrating tablet    Pharmacy where request should be sent: [facility]    Additional details provided by patient: PATIENT IS OUT OF THE MEDICATION    Best call back number:    [phone]      Does the patient have less than a 3 day supply:  [x] Yes  [] No    Hollis Salter Rep  04/11/23, 09:29 EDT

## 2023-04-21 ENCOUNTER — TELEPHONE (OUTPATIENT)
Dept: FAMILY MEDICINE CLINIC | Facility: CLINIC | Age: 58
End: 2023-04-21

## 2023-04-21 NOTE — TELEPHONE ENCOUNTER
Caller: [facility]    Relationship: Home Health    Best call back number: [phone]    What was the call regarding: [facility] STATES THAT A PLAN OF CARE FOR PATIENT IN 12/22 NEEDS TO BE SIGNED AND RETURNED. CINDY STATES THAT SHE FAXED THE FORM    Do you require a callback: YES IF ANY QUESTIONS

## 2023-05-23 DIAGNOSIS — G89.4 CHRONIC PAIN SYNDROME: ICD-10-CM

## 2023-05-23 DIAGNOSIS — E11.42 TYPE 2 DIABETES MELLITUS WITH PERIPHERAL NEUROPATHY: ICD-10-CM

## 2023-05-23 RX ORDER — GABAPENTIN 300 MG/1
CAPSULE ORAL
Qty: 90 CAPSULE | Refills: 1 | Status: SHIPPED | OUTPATIENT
Start: 2023-05-23

## 2023-05-31 ENCOUNTER — TELEPHONE (OUTPATIENT)
Dept: FAMILY MEDICINE CLINIC | Facility: CLINIC | Age: 58
End: 2023-05-31

## 2023-05-31 NOTE — TELEPHONE ENCOUNTER
Unable to get a hold of home health. Will call again later. Pt does not have transportation to come for an apt tomorrow.

## 2023-05-31 NOTE — TELEPHONE ENCOUNTER
Caller: [facility]SON    Relationship: [facility]    Best call back number: [phone]    What orders are you requesting (i.e. lab or imaging): [facility] BOOTS TWICE A WEEK      Additional notes: HOME HEALTH NEEDS A VERBAL ORDER TO TREAT THE PATIENT FOR HER RIGHT LEG BEING SWOLLEN, SCABBING, AND BLISTERS. THEY THINK CELLULITIS MAY BE STARTING. PLEASE CALL KARSON TO ADVISE ASAP.  THANK YOU.

## 2023-06-14 RX ORDER — ONDANSETRON 8 MG/1
TABLET, ORALLY DISINTEGRATING ORAL
Qty: 30 TABLET | Refills: 5 | Status: SHIPPED | OUTPATIENT
Start: 2023-06-14

## 2023-07-13 ENCOUNTER — TELEPHONE (OUTPATIENT)
Dept: FAMILY MEDICINE CLINIC | Facility: CLINIC | Age: 58
End: 2023-07-13

## 2023-07-13 NOTE — TELEPHONE ENCOUNTER
Caller: Jocelyn Palomo    Relationship to patient: Self    Best call back number: [phone]    Chief complaint: 3 MONTH F/U    Type of visit: OV    Requested date: 7/19/23     If rescheduling, when is the original appointment: 7/14/23     Additional notes: PATIENT WON'T HAVE TRANSPORTATION UNTIL 7/19/23

## 2023-07-14 ENCOUNTER — TELEPHONE (OUTPATIENT)
Dept: FAMILY MEDICINE CLINIC | Facility: CLINIC | Age: 58
End: 2023-07-14

## 2023-07-14 NOTE — TELEPHONE ENCOUNTER
Caller: Jocelyn Palomo    Relationship: Self    Best call back number: [phone]    Requested Prescriptions:   Requested Prescriptions      No prescriptions requested or ordered in this encounter      gabapentin (NEURONTIN) 300 MG capsule     potassium chloride (K-DUR,KLOR-CON) 10 MEQ CR tablet     Pharmacy where request should be sent:      Last office visit with prescribing clinician: 10/7/2022   Last telemedicine visit with prescribing clinician: Visit date not found   Next office visit with prescribing clinician: 7/20/2023       Does the patient have less than a 3 day supply:  [] Yes  [x] No    Would you like a call back once the refill request has been completed: [] Yes [x] No    If the office needs to give you a call back, can they leave a voicemail: [] Yes [x] No    Criss Dumas, PCT   07/14/23 14:50 EDT

## 2023-07-20 PROBLEM — L98.9 SKIN LESION: Status: ACTIVE | Noted: 2023-07-20

## 2023-07-24 ENCOUNTER — TELEPHONE (OUTPATIENT)
Dept: FAMILY MEDICINE CLINIC | Facility: CLINIC | Age: 58
End: 2023-07-24

## 2023-07-24 NOTE — TELEPHONE ENCOUNTER
Caller: MICHAEL    Relationship: Home Health    Best call back number: [phone]    What orders are you requesting (i.e. lab or imaging): [facility]NA BOOT ORDER     In what timeframe would the patient need to come in: N/A    Where will you receive your lab/imaging services: FAX: FAX:[phone]    Additional notes: MICHAEL STATED THAT PATIENT WOULD NEED ORDER FOR INSURANCE TO COVER THIS MEDICAL SUPPLY AGAIN FAXED TO ABOVE NUMBER

## 2023-08-02 ENCOUNTER — EXTERNAL PBMM DATA (OUTPATIENT)
Dept: PHARMACY | Facility: OTHER | Age: 58
End: 2023-08-02
Payer: MEDICAID

## 2023-08-30 ENCOUNTER — EXTERNAL PBMM DATA (OUTPATIENT)
Dept: PHARMACY | Facility: OTHER | Age: 58
End: 2023-08-30
Payer: MEDICAID

## 2023-09-28 ENCOUNTER — EXTERNAL PBMM DATA (OUTPATIENT)
Dept: PHARMACY | Facility: OTHER | Age: 58
End: 2023-09-28
Payer: MEDICAID

## 2023-10-16 DIAGNOSIS — E11.42 TYPE 2 DIABETES MELLITUS WITH PERIPHERAL NEUROPATHY: ICD-10-CM

## 2023-10-16 DIAGNOSIS — K21.9 GASTROESOPHAGEAL REFLUX DISEASE WITHOUT ESOPHAGITIS: ICD-10-CM

## 2023-10-16 DIAGNOSIS — G89.4 CHRONIC PAIN SYNDROME: ICD-10-CM

## 2023-10-16 RX ORDER — ONDANSETRON 8 MG/1
TABLET, ORALLY DISINTEGRATING ORAL
Qty: 30 TABLET | Refills: 0 | Status: SHIPPED | OUTPATIENT
Start: 2023-10-16

## 2023-10-16 RX ORDER — GABAPENTIN 300 MG/1
CAPSULE ORAL
Qty: 90 CAPSULE | Refills: 0 | Status: SHIPPED | OUTPATIENT
Start: 2023-10-16

## 2023-10-17 ENCOUNTER — POP HEALTH PHARMACY (OUTPATIENT)
Dept: PHARMACY | Facility: OTHER | Age: 58
End: 2023-10-17
Payer: MEDICAID

## 2023-10-25 ENCOUNTER — EXTERNAL PBMM DATA (OUTPATIENT)
Dept: PHARMACY | Facility: OTHER | Age: 58
End: 2023-10-25
Payer: MEDICAID

## 2023-11-08 DIAGNOSIS — E55.9 VITAMIN D DEFICIENCY: ICD-10-CM

## 2023-11-08 DIAGNOSIS — E11.42 TYPE 2 DIABETES MELLITUS WITH PERIPHERAL NEUROPATHY: ICD-10-CM

## 2023-11-08 DIAGNOSIS — I10 ESSENTIAL HYPERTENSION: Chronic | ICD-10-CM

## 2023-11-08 DIAGNOSIS — G89.4 CHRONIC PAIN SYNDROME: ICD-10-CM

## 2023-11-08 DIAGNOSIS — E78.2 MIXED HYPERLIPIDEMIA: Chronic | ICD-10-CM

## 2023-11-08 DIAGNOSIS — K21.9 GASTROESOPHAGEAL REFLUX DISEASE WITHOUT ESOPHAGITIS: Chronic | ICD-10-CM

## 2023-11-08 DIAGNOSIS — E11.42 TYPE 2 DIABETES MELLITUS WITH PERIPHERAL NEUROPATHY: Chronic | ICD-10-CM

## 2023-11-08 DIAGNOSIS — K21.9 GASTROESOPHAGEAL REFLUX DISEASE WITHOUT ESOPHAGITIS: ICD-10-CM

## 2023-11-08 RX ORDER — ERGOCALCIFEROL 1.25 MG/1
50000 CAPSULE ORAL
Qty: 5 CAPSULE | Refills: 5 | Status: SHIPPED | OUTPATIENT
Start: 2023-11-08

## 2023-11-08 RX ORDER — INSULIN LISPRO 100 [IU]/ML
6 INJECTION, SOLUTION INTRAVENOUS; SUBCUTANEOUS
Qty: 3 ML | Refills: 3 | Status: SHIPPED | OUTPATIENT
Start: 2023-11-08

## 2023-11-08 RX ORDER — ONDANSETRON 8 MG/1
8 TABLET, ORALLY DISINTEGRATING ORAL EVERY 12 HOURS PRN
Qty: 30 TABLET | Refills: 0 | Status: SHIPPED | OUTPATIENT
Start: 2023-11-08

## 2023-11-08 RX ORDER — GABAPENTIN 300 MG/1
300 CAPSULE ORAL 3 TIMES DAILY
Qty: 90 CAPSULE | Refills: 0 | Status: SHIPPED | OUTPATIENT
Start: 2023-11-08

## 2023-11-08 RX ORDER — ICOSAPENT ETHYL 1000 MG/1
2 CAPSULE ORAL 2 TIMES DAILY WITH MEALS
Qty: 120 CAPSULE | Refills: 5 | Status: SHIPPED | OUTPATIENT
Start: 2023-11-08

## 2023-11-08 RX ORDER — SEMAGLUTIDE 0.68 MG/ML
0.5 INJECTION, SOLUTION SUBCUTANEOUS WEEKLY
Qty: 3 ML | Refills: 3 | Status: SHIPPED | OUTPATIENT
Start: 2023-11-08

## 2023-11-08 RX ORDER — PANTOPRAZOLE SODIUM 40 MG/1
40 TABLET, DELAYED RELEASE ORAL DAILY
Qty: 30 TABLET | Refills: 5 | Status: SHIPPED | OUTPATIENT
Start: 2023-11-08

## 2023-11-08 RX ORDER — POTASSIUM CHLORIDE 750 MG/1
10 TABLET, EXTENDED RELEASE ORAL DAILY
Qty: 30 TABLET | Refills: 5 | Status: SHIPPED | OUTPATIENT
Start: 2023-11-08

## 2023-11-08 NOTE — TELEPHONE ENCOUNTER
Caller: Jocelyn Palomo    Relationship: Self    Best call back number: [phone]     Requested Prescriptions:   Requested Prescriptions     Pending Prescriptions Disp Refills    Semaglutide,0.25 or 0.5MG/DOS, (Ozempic, 0.25 or 0.5 MG/DOSE,) 2 MG/3ML solution pen-injector 3 mL 3     Sig: Inject 0.5 mg under the skin into the appropriate area as directed 1 (One) Time Per Week.    vitamin D (ERGOCALCIFEROL) 1.25 MG (35586 UT) capsule capsule 5 capsule 5     Sig: Take 1 capsule by mouth Every 7 (Seven) Days.    Insulin Lispro (humaLOG) 100 UNIT/ML injection 3 mL 3     Sig: Inject 6 Units under the skin into the appropriate area as directed 3 (Three) Times a Day Before Meals.    potassium chloride (K-DUR,KLOR-CON) 10 MEQ CR tablet 30 tablet 5     Sig: Take 1 tablet by mouth Daily.    gabapentin (NEURONTIN) 300 MG capsule 90 capsule 0    icosapent ethyl (Vascepa) 1 g capsule capsule 120 capsule 5     Sig: Take 2 g by mouth 2 (Two) Times a Day With Meals.    pantoprazole (PROTONIX) 40 MG EC tablet 30 tablet 5     Sig: Take 1 tablet by mouth Daily.    ondansetron ODT (ZOFRAN-ODT) 8 MG disintegrating tablet 30 tablet 0        Pharmacy where request should be sent: [address] - [phone]  - [phone] [facility]     Last office visit with prescribing clinician: 7/20/2023   Last telemedicine visit with prescribing clinician: Visit date not found   Next office visit with prescribing clinician: 12/22/2023     Additional details provided by patient:     Does the patient have less than a 3 day supply:  [] Yes  [x] No    Would you like a call back once the refill request has been completed: [] Yes [x] No    If the office needs to give you a call back, can they leave a voicemail: [] Yes [x] No    Cherise Matthias   11/08/23 14:18 EST

## 2023-11-27 ENCOUNTER — EXTERNAL PBMM DATA (OUTPATIENT)
Dept: PHARMACY | Facility: OTHER | Age: 58
End: 2023-11-27
Payer: MEDICAID

## 2023-12-04 DIAGNOSIS — K21.9 GASTROESOPHAGEAL REFLUX DISEASE WITHOUT ESOPHAGITIS: ICD-10-CM

## 2023-12-04 RX ORDER — ONDANSETRON 8 MG/1
TABLET, ORALLY DISINTEGRATING ORAL
Qty: 30 TABLET | Refills: 0 | Status: SHIPPED | OUTPATIENT
Start: 2023-12-04

## 2023-12-13 DIAGNOSIS — G89.4 CHRONIC PAIN SYNDROME: ICD-10-CM

## 2023-12-13 DIAGNOSIS — E11.42 TYPE 2 DIABETES MELLITUS WITH PERIPHERAL NEUROPATHY: ICD-10-CM

## 2023-12-14 RX ORDER — GABAPENTIN 300 MG/1
CAPSULE ORAL
Qty: 90 CAPSULE | Refills: 0 | Status: SHIPPED | OUTPATIENT
Start: 2023-12-14

## 2023-12-22 ENCOUNTER — OFFICE VISIT (OUTPATIENT)
Dept: FAMILY MEDICINE CLINIC | Facility: CLINIC | Age: 58
End: 2023-12-22
Payer: MEDICARE

## 2023-12-22 VITALS
RESPIRATION RATE: 14 BRPM | BODY MASS INDEX: 25.79 KG/M2 | SYSTOLIC BLOOD PRESSURE: 124 MMHG | OXYGEN SATURATION: 97 % | TEMPERATURE: 98.6 F | DIASTOLIC BLOOD PRESSURE: 64 MMHG | HEIGHT: 65 IN | HEART RATE: 86 BPM

## 2023-12-22 DIAGNOSIS — Z89.612 HX OF AKA (ABOVE KNEE AMPUTATION), LEFT: ICD-10-CM

## 2023-12-22 DIAGNOSIS — E11.42 TYPE 2 DIABETES MELLITUS WITH PERIPHERAL NEUROPATHY: Chronic | ICD-10-CM

## 2023-12-22 DIAGNOSIS — I10 ESSENTIAL HYPERTENSION: ICD-10-CM

## 2023-12-22 DIAGNOSIS — Z00.00 HEALTHCARE MAINTENANCE: ICD-10-CM

## 2023-12-22 DIAGNOSIS — I10 ESSENTIAL HYPERTENSION: Chronic | ICD-10-CM

## 2023-12-22 DIAGNOSIS — K02.9 DENTAL CARIES: ICD-10-CM

## 2023-12-22 DIAGNOSIS — N18.2 CHRONIC RENAL FAILURE, STAGE 2 (MILD): ICD-10-CM

## 2023-12-22 DIAGNOSIS — E78.2 MIXED HYPERLIPIDEMIA: Primary | ICD-10-CM

## 2023-12-22 DIAGNOSIS — F33.41 RECURRENT MAJOR DEPRESSIVE DISORDER, IN PARTIAL REMISSION: ICD-10-CM

## 2023-12-22 DIAGNOSIS — E55.9 VITAMIN D DEFICIENCY: ICD-10-CM

## 2023-12-22 DIAGNOSIS — Z23 ENCOUNTER FOR IMMUNIZATION: ICD-10-CM

## 2023-12-22 DIAGNOSIS — F17.200 CURRENT SMOKER: ICD-10-CM

## 2023-12-22 DIAGNOSIS — K21.9 GASTROESOPHAGEAL REFLUX DISEASE WITHOUT ESOPHAGITIS: Chronic | ICD-10-CM

## 2023-12-22 DIAGNOSIS — K21.9 GASTROESOPHAGEAL REFLUX DISEASE WITHOUT ESOPHAGITIS: ICD-10-CM

## 2023-12-22 DIAGNOSIS — G89.4 CHRONIC PAIN SYNDROME: ICD-10-CM

## 2023-12-22 DIAGNOSIS — E78.2 MIXED HYPERLIPIDEMIA: Chronic | ICD-10-CM

## 2023-12-22 DIAGNOSIS — E11.42 TYPE 2 DIABETES MELLITUS WITH PERIPHERAL NEUROPATHY: ICD-10-CM

## 2023-12-22 LAB
EXPIRATION DATE: ABNORMAL
HBA1C MFR BLD: 7 % (ref 4.5–5.7)
Lab: ABNORMAL

## 2023-12-22 RX ORDER — ROSUVASTATIN CALCIUM 40 MG/1
40 TABLET, COATED ORAL NIGHTLY
Qty: 30 TABLET | Refills: 5 | Status: SHIPPED | OUTPATIENT
Start: 2023-12-22

## 2023-12-22 RX ORDER — PANTOPRAZOLE SODIUM 40 MG/1
40 TABLET, DELAYED RELEASE ORAL DAILY
Qty: 30 TABLET | Refills: 5 | Status: SHIPPED | OUTPATIENT
Start: 2023-12-22

## 2023-12-22 RX ORDER — ICOSAPENT ETHYL 1000 MG/1
2 CAPSULE ORAL 2 TIMES DAILY WITH MEALS
Qty: 120 CAPSULE | Refills: 5 | Status: SHIPPED | OUTPATIENT
Start: 2023-12-22

## 2023-12-22 RX ORDER — POTASSIUM CHLORIDE 750 MG/1
10 TABLET, EXTENDED RELEASE ORAL DAILY
Qty: 30 TABLET | Refills: 5 | Status: SHIPPED | OUTPATIENT
Start: 2023-12-22

## 2023-12-22 RX ORDER — ERGOCALCIFEROL 1.25 MG/1
50000 CAPSULE ORAL
Qty: 5 CAPSULE | Refills: 5 | Status: SHIPPED | OUTPATIENT
Start: 2023-12-22

## 2023-12-22 RX ORDER — LANCETS 28 GAUGE
1 EACH MISCELLANEOUS
Qty: 100 EACH | Refills: 5 | Status: SHIPPED | OUTPATIENT
Start: 2023-12-22

## 2023-12-22 RX ORDER — HYDROXYZINE HYDROCHLORIDE 25 MG/1
25 TABLET, FILM COATED ORAL 2 TIMES DAILY
Qty: 60 TABLET | Refills: 5 | Status: SHIPPED | OUTPATIENT
Start: 2023-12-22

## 2023-12-22 RX ORDER — EZETIMIBE 10 MG/1
10 TABLET ORAL DAILY
Qty: 30 TABLET | Refills: 5 | Status: SHIPPED | OUTPATIENT
Start: 2023-12-22

## 2023-12-22 RX ORDER — LISINOPRIL AND HYDROCHLOROTHIAZIDE 25; 20 MG/1; MG/1
1 TABLET ORAL DAILY
Qty: 30 TABLET | Refills: 5 | Status: SHIPPED | OUTPATIENT
Start: 2023-12-22

## 2023-12-22 RX ORDER — SEMAGLUTIDE 0.68 MG/ML
0.5 INJECTION, SOLUTION SUBCUTANEOUS WEEKLY
Qty: 3 ML | Refills: 5 | Status: SHIPPED | OUTPATIENT
Start: 2023-12-22

## 2023-12-22 RX ORDER — INSULIN LISPRO 100 [IU]/ML
6 INJECTION, SOLUTION INTRAVENOUS; SUBCUTANEOUS
Qty: 3 ML | Refills: 5 | Status: SHIPPED | OUTPATIENT
Start: 2023-12-22

## 2023-12-22 NOTE — PROGRESS NOTES
Subjective   Jocelyn Palomo is a 58 y.o. female.     Chief Complaint  She returns for a scheduled reassessment of multiple medical problems including type 2 diabetes mellitus complicated by previous osteomyelitis and a left AKA, hyperlipidemia, essential hypertension, and depression    History of Present Illness     Left AKA  She underwent a left AKA approximately 2 years ago.  She was discharged to a rehabilitation facility but is now at home living relatively independently.  She has family close by that check on her regularly.  She has received her prosthetic and is walking some with it    Type 2 Diabetes Mellitus  She admits to paresthesias of the right foot along with intermittent visual blurring.  She continues to deny any visual loss, polydipsia, polyuria, or hypoglycemia. Evaluation to date has been: hemoglobin A1C. Current treatments: GLP agonist-semaglutide, basal insulin - levemir 50 qd and mealtime insulin - novolog 10 with meals.  She underwent an updated diabetic eye exam on 6/16/2022 with no evidence of retinopathy  Lab Results   Component Value Date    HGBA1C 7.0 (A) 12/22/2023     Hyperlipidemia  Her compliance with treatment has been fair.  She continues to work on her diet and what exercise she can tolerate. She remains on rosuvastatin and omega-3 fatty acid with no apparent side effects    Essential Hypertension  Home blood pressure readings: not doing.  She continues to deny any chest pain, palpitations, dyspnea, orthopnea, paroxysmal nocturnal dyspnea or peripheral edema.  She remains on lisinopril and hydrochlorothiazide.     Chronic Pain Syndrome  She has chronic low back and joint pain as well as burning and tingling about her right foot.  She also has had some phantom pain since her left AKA.  She continues to deny any changes in her strength, gait, or bowel/bladder control.  She appears to be taking gabapentin as prescribed and feels that it is helped considerably    Depression  She has been  bored at home and lonely at times but denies any persistent depression or loss of interest in activities.  Her appetite has been good and she has been sleeping fairly well.  She denies any suicidal ideation.  She is currently on hydroxyzine as needed    The following portions of the patient's history were reviewed and updated as appropriate: allergies, current medications, past medical history, past social history, and problem list.    Review of Systems   Constitutional:  Positive for fatigue. Negative for chills and fever.   HENT:  Negative for congestion, ear pain, rhinorrhea and sore throat.    Eyes:  Negative for visual disturbance.   Respiratory:  Negative for cough, shortness of breath and wheezing.    Cardiovascular:  Negative for chest pain, palpitations and leg swelling.   Gastrointestinal:  Negative for abdominal pain, blood in stool, constipation, diarrhea, nausea and vomiting.   Genitourinary:  Positive for urinary incontinence. Negative for dysuria and hematuria.   Musculoskeletal:  Positive for arthralgias, back pain and neck pain. Negative for joint swelling and myalgias.   Skin:  Negative for rash.   Neurological:  Positive for numbness. Negative for dizziness and weakness.   Psychiatric/Behavioral:  Positive for decreased concentration. Negative for sleep disturbance, suicidal ideas and depressed mood. The patient is nervous/anxious.      Objective   Physical Exam  Constitutional:       General: She is not in acute distress.     Appearance: Normal appearance. She is well-developed. She is not diaphoretic.      Comments: Accompanied by family member.  Alert and in fair spirits.  Sitting in a wheelchair.  No apparent distress. No pallor, jaundice, diaphoresis, or cyanosis.     HENT:      Head: Atraumatic.      Right Ear: Tympanic membrane, ear canal and external ear normal.      Left Ear: Tympanic membrane, ear canal and external ear normal.      Mouth/Throat:      Lips: No lesions.      Mouth: Mucous  membranes are moist. No oral lesions.      Dentition: Dental caries present.      Pharynx: No oropharyngeal exudate or posterior oropharyngeal erythema.   Eyes:      General: Lids are normal.      Extraocular Movements: Extraocular movements intact.      Conjunctiva/sclera: Conjunctivae normal.      Pupils: Pupils are equal.   Neck:      Thyroid: No thyroid mass or thyromegaly.      Vascular: No carotid bruit or JVD.      Trachea: Trachea normal. No tracheal deviation.   Cardiovascular:      Rate and Rhythm: Normal rate and regular rhythm.      Heart sounds: Normal heart sounds, S1 normal and S2 normal. No murmur heard.     No gallop.   Pulmonary:      Effort: Pulmonary effort is normal.      Breath sounds: Normal breath sounds.   Abdominal:      General: Bowel sounds are normal. There is no distension.   Musculoskeletal:      Right lower leg: No edema.      Left lower leg: No edema.      Left Lower Extremity: Left leg is amputated above knee.   Lymphadenopathy:      Head:      Right side of head: No submental, submandibular, tonsillar, preauricular, posterior auricular or occipital adenopathy.      Left side of head: No submental, submandibular, tonsillar, preauricular, posterior auricular or occipital adenopathy.      Cervical: No cervical adenopathy.      Upper Body:      Right upper body: No supraclavicular adenopathy.      Left upper body: No supraclavicular adenopathy.   Skin:     General: Skin is warm.      Coloration: Skin is not cyanotic, jaundiced or pale.      Findings: No rash.      Nails: There is no clubbing.   Neurological:      Mental Status: She is alert and oriented to person, place, and time.      Cranial Nerves: No cranial nerve deficit, dysarthria or facial asymmetry.      Sensory: Sensory deficit (decreased vibration sense right foot) present.      Motor: No tremor.      Coordination: Coordination normal.      Gait: Gait normal.   Psychiatric:         Attention and Perception: Attention normal.          Mood and Affect: Mood normal.         Speech: Speech normal.         Behavior: Behavior normal.         Thought Content: Thought content normal.       Assessment & Plan   Diagnoses and all orders for this visit:    1. Mixed hyperlipidemia   Encouraged to continue to work on her diet and exercise plan.  Continue current medicatiom  -     Comprehensive Metabolic Panel; Future  -     Lipid Panel; Future  -     TSH; Future  -     ezetimibe (ZETIA) 10 MG tablet; Take 1 tablet by mouth Daily.  Dispense: 30 tablet; Refill: 5  -     icosapent ethyl (Vascepa) 1 g capsule capsule; Take 2 g by mouth 2 (Two) Times a Day With Meals.  Dispense: 120 capsule; Refill: 5  -     rosuvastatin (CRESTOR) 40 MG tablet; Take 1 tablet by mouth Every Night.  Dispense: 30 tablet; Refill: 5    2. Essential hypertension  As above.   Continue current medication.        -     Comprehensive Metabolic Panel; Future  -     TSH; Future  -     CBC & Differential; Future  -     lisinopril-hydrochlorothiazide (PRINZIDE,ZESTORETIC) 20-25 MG per tablet; Take 1 tablet by mouth Daily.  Dispense: 30 tablet; Refill: 5  -     potassium chloride (K-DUR,KLOR-CON) 10 MEQ CR tablet; Take 1 tablet by mouth Daily.  Dispense: 30 tablet; Refill: 5    3. Type 2 diabetes mellitus with peripheral neuropathy  Diabetes mellitus Type II, under good control.   Encouraged to continue to pursue ADA diet  Encouraged aerobic exercise.  Continue current medication  We will arrange an updated dilated eye exam  -     POC Glycosylated Hemoglobin (Hb A1C)  -     Comprehensive Metabolic Panel; Future  -     Hemoglobin A1c; Future  -     MicroAlbumin, Urine, Random - Urine, Clean Catch; Future  -     TSH; Future  -     Vitamin B12; Future  -     Ambulatory Referral for Diabetic Eye Exam-Optometry  -     glucose blood test strip; 1 each by Other route 3 (Three) Times a Day.  Dispense: 100 each; Refill: 5  -     Insulin Lispro (humaLOG) 100 UNIT/ML injection; Inject 6 Units under  the skin into the appropriate area as directed 3 (Three) Times a Day Before Meals.  Dispense: 3 mL; Refill: 5  -     Insulin Pen Needle 32G X 4 MM misc; Use 1 each 4 (Four) Times a Day.  Dispense: 120 each; Refill: 5  -     Lancets Ultra Fine misc; Use 1 Device 3 (Three) Times a Day With Meals.  Dispense: 100 each; Refill: 5  -     Semaglutide,0.25 or 0.5MG/DOS, (Ozempic, 0.25 or 0.5 MG/DOSE,) 2 MG/3ML solution pen-injector; Inject 0.5 mg under the skin into the appropriate area as directed 1 (One) Time Per Week.  Dispense: 3 mL; Refill: 5    4. Gastroesophageal reflux disease without esophagitis  -     CBC & Differential; Future  -     pantoprazole (PROTONIX) 40 MG EC tablet; Take 1 tablet by mouth Daily.  Dispense: 30 tablet; Refill: 5    5. Chronic renal failure, stage 2 (mild)  Reminded to avoid any NSAIDs prescription or OTC  Will continue to monitor  -     Comprehensive Metabolic Panel; Future  -     CBC & Differential; Future    6. Healthcare maintenance  Flu shot and Prevnar 20 administered  Patient remains uninterested in any cancer screening  We will arrange a dental assessment  -     Fluzone (or Fluarix & Flulaval for VFC) >6mos  -     Pneumococcal Conjugate Vaccine 20-Valent All  -     Ambulatory Referral for Diabetic Eye Exam-Optometry  -     Ambulatory Referral to Dentistry    7. Recurrent major depressive disorder, in partial remission  Stable.  Supportive therapy.   Encouraged to report if any worse or if any new symptoms or concerns.  -     TSH; Future  -     hydrOXYzine (ATARAX) 25 MG tablet; Take 1 tablet by mouth 2 (Two) Times a Day.  Dispense: 60 tablet; Refill: 5    8. Hx of AKA (above knee amputation), left    9. Chronic pain syndrome  Reminded regarding symptomatic treatment.   Continue current medication  Encouraged to report if any worse or if any new symptoms or concerns.    10. Current smoker    11. Dental caries  As above.  -     Ambulatory Referral to Dentistry

## 2023-12-27 ENCOUNTER — TELEPHONE (OUTPATIENT)
Dept: FAMILY MEDICINE CLINIC | Facility: CLINIC | Age: 58
End: 2023-12-27
Payer: MEDICARE

## 2023-12-27 DIAGNOSIS — K21.9 GASTROESOPHAGEAL REFLUX DISEASE WITHOUT ESOPHAGITIS: ICD-10-CM

## 2023-12-27 RX ORDER — ONDANSETRON 8 MG/1
TABLET, ORALLY DISINTEGRATING ORAL
Qty: 30 TABLET | Refills: 0 | Status: SHIPPED | OUTPATIENT
Start: 2023-12-27

## 2023-12-27 NOTE — TELEPHONE ENCOUNTER
PATIENT IS IN NEED OF THE ZOFRAN-ODT (OUT OF ZOFRAN)     Caller: Jocelyn Palomo    Relationship: Self    Best call back number: [phone]     Requested Prescriptions:   ALL ACTIVE MEDICATIONS        Pharmacy where request should be sent: [address] - [phone]  - [phone] [facility]     Last office visit with prescribing clinician: 12/22/2023   Last telemedicine visit with prescribing clinician: Visit date not found   Next office visit with prescribing clinician: 2/5/2024     Additional details provided by patient: PATIENT CALLED STATING THAT SHE WAS SEEN BUT MEDICATIONS WERE NOT CALLED IN.   PATIENT IS IN NEED OF THE ZOFRAN-ODT (OUT OF ZOFRAN)     Does the patient have less than a 3 day supply:  [x] Yes  [] No    Would you like a call back once the refill request has been completed: [] Yes [x] No    If the office needs to give you a call back, can they leave a voicemail: [x] Yes [] No    Hollis Mercer Rep   12/27/23 11:49 EST

## 2024-01-10 DIAGNOSIS — E11.42 TYPE 2 DIABETES MELLITUS WITH PERIPHERAL NEUROPATHY: ICD-10-CM

## 2024-01-10 DIAGNOSIS — G89.4 CHRONIC PAIN SYNDROME: ICD-10-CM

## 2024-01-10 RX ORDER — GABAPENTIN 300 MG/1
CAPSULE ORAL
Qty: 90 CAPSULE | Refills: 1 | Status: SHIPPED | OUTPATIENT
Start: 2024-01-10

## 2024-01-12 DIAGNOSIS — K21.9 GASTROESOPHAGEAL REFLUX DISEASE WITHOUT ESOPHAGITIS: ICD-10-CM

## 2024-01-12 RX ORDER — ONDANSETRON 8 MG/1
TABLET, ORALLY DISINTEGRATING ORAL
Qty: 30 TABLET | Refills: 0 | Status: SHIPPED | OUTPATIENT
Start: 2024-01-12

## 2024-01-24 RX ORDER — GABAPENTIN 100 MG/1
CAPSULE ORAL
Qty: 120 CAPSULE | Refills: 1 | OUTPATIENT
Start: 2024-01-24

## 2024-01-29 DIAGNOSIS — K21.9 GASTROESOPHAGEAL REFLUX DISEASE WITHOUT ESOPHAGITIS: ICD-10-CM

## 2024-01-29 RX ORDER — ONDANSETRON 8 MG/1
TABLET, ORALLY DISINTEGRATING ORAL
Qty: 30 TABLET | Refills: 0 | Status: SHIPPED | OUTPATIENT
Start: 2024-01-29

## 2024-02-12 ENCOUNTER — TELEPHONE (OUTPATIENT)
Dept: FAMILY MEDICINE CLINIC | Facility: CLINIC | Age: 59
End: 2024-02-12

## 2024-02-12 DIAGNOSIS — K21.9 GASTROESOPHAGEAL REFLUX DISEASE WITHOUT ESOPHAGITIS: ICD-10-CM

## 2024-02-12 RX ORDER — ONDANSETRON 8 MG/1
TABLET, ORALLY DISINTEGRATING ORAL
Qty: 30 TABLET | Refills: 0 | Status: SHIPPED | OUTPATIENT
Start: 2024-02-12

## 2024-02-12 NOTE — TELEPHONE ENCOUNTER
Caller: Jocelyn Palomo    Relationship: Self    Best call back number: [phone]    What is the best time to reach you: ANYTIME    Who are you requesting to speak with (clinical staff, provider,  specific staff member): CLINICAL STAFF    What was the call regarding: PATIENT HAS ABOUT 10 BOXES OF PEN NEEDLES. PLEASE DO NOT PRESCRIBE ANY MORE PEN NEEDLES. PATIENT WOULD LIKE TO KNOW DOES SHE STILL NEED TO TAKE THE NOVALOG BECAUSE THE OZEMPIC IS DOING SO WELL. AND IF YOU DO STILL WANT HER TO TAKE THE NOVALOG, PLEASE PRESCRIBE THE FLEX PENS INSTEAD OF THE OTHER KIND.

## 2024-03-01 DIAGNOSIS — K21.9 GASTROESOPHAGEAL REFLUX DISEASE WITHOUT ESOPHAGITIS: ICD-10-CM

## 2024-03-01 RX ORDER — ONDANSETRON 8 MG/1
TABLET, ORALLY DISINTEGRATING ORAL
Qty: 30 TABLET | Refills: 0 | Status: SHIPPED | OUTPATIENT
Start: 2024-03-01

## 2024-03-05 DIAGNOSIS — G89.4 CHRONIC PAIN SYNDROME: ICD-10-CM

## 2024-03-05 DIAGNOSIS — E11.42 TYPE 2 DIABETES MELLITUS WITH PERIPHERAL NEUROPATHY: ICD-10-CM

## 2024-03-05 RX ORDER — GABAPENTIN 300 MG/1
CAPSULE ORAL
Qty: 90 CAPSULE | Refills: 0 | Status: SHIPPED | OUTPATIENT
Start: 2024-03-05

## 2024-03-19 DIAGNOSIS — K21.9 GASTROESOPHAGEAL REFLUX DISEASE WITHOUT ESOPHAGITIS: ICD-10-CM

## 2024-03-19 RX ORDER — ONDANSETRON 8 MG/1
TABLET, ORALLY DISINTEGRATING ORAL
Qty: 30 TABLET | Refills: 0 | Status: SHIPPED | OUTPATIENT
Start: 2024-03-19

## 2024-03-22 ENCOUNTER — OFFICE VISIT (OUTPATIENT)
Dept: FAMILY MEDICINE CLINIC | Facility: CLINIC | Age: 59
End: 2024-03-22
Payer: MEDICARE

## 2024-03-22 DIAGNOSIS — Z89.612 HX OF AKA (ABOVE KNEE AMPUTATION), LEFT: ICD-10-CM

## 2024-03-22 DIAGNOSIS — E78.2 MIXED HYPERLIPIDEMIA: Primary | ICD-10-CM

## 2024-03-22 DIAGNOSIS — F33.41 RECURRENT MAJOR DEPRESSIVE DISORDER, IN PARTIAL REMISSION: ICD-10-CM

## 2024-03-22 DIAGNOSIS — I10 ESSENTIAL HYPERTENSION: ICD-10-CM

## 2024-03-22 DIAGNOSIS — N18.2 CHRONIC RENAL FAILURE, STAGE 2 (MILD): ICD-10-CM

## 2024-03-22 DIAGNOSIS — Z00.00 HEALTHCARE MAINTENANCE: ICD-10-CM

## 2024-03-22 DIAGNOSIS — F17.200 CURRENT SMOKER: ICD-10-CM

## 2024-03-22 DIAGNOSIS — K21.9 GASTROESOPHAGEAL REFLUX DISEASE WITHOUT ESOPHAGITIS: ICD-10-CM

## 2024-03-22 DIAGNOSIS — G89.4 CHRONIC PAIN SYNDROME: ICD-10-CM

## 2024-03-22 DIAGNOSIS — E11.42 TYPE 2 DIABETES MELLITUS WITH PERIPHERAL NEUROPATHY: ICD-10-CM

## 2024-03-22 PROCEDURE — 82607 VITAMIN B-12: CPT | Performed by: GENERAL PRACTICE

## 2024-03-22 PROCEDURE — 80061 LIPID PANEL: CPT | Performed by: GENERAL PRACTICE

## 2024-03-22 PROCEDURE — 85025 COMPLETE CBC W/AUTO DIFF WBC: CPT | Performed by: GENERAL PRACTICE

## 2024-03-22 PROCEDURE — 83036 HEMOGLOBIN GLYCOSYLATED A1C: CPT | Performed by: GENERAL PRACTICE

## 2024-03-22 PROCEDURE — 80053 COMPREHEN METABOLIC PANEL: CPT | Performed by: GENERAL PRACTICE

## 2024-03-22 PROCEDURE — 84443 ASSAY THYROID STIM HORMONE: CPT | Performed by: GENERAL PRACTICE

## 2024-03-22 NOTE — PROGRESS NOTES
Subjective   Jocelyn Palomo is a 58 y.o. female.     Chief Complaint  She returns for a scheduled reassessment of multiple medical problems including previous VS left AKA, type 2 diabetes mellitus, hyperlipidemia, essential hypertension, chronic pain syndrome, and depression    History of Present Illness     Left AKA  She is post left AKA.  She continues to live at home relatively independently.  She has family close by that check on her regularly.  She has received her prosthetic and is walking some with it    Type 2 Diabetes Mellitus  She admits to paresthesias of the right foot along with intermittent visual blurring.  She continues to deny any visual loss, polydipsia, polyuria, or hypoglycemia. Evaluation to date has been: hemoglobin A1C. Current treatments: GLP agonist-semaglutide, basal insulin - levemir 50 qd and mealtime insulin - novolog 10 with meals.  She is due for an updated diabetic eye exam   Lab Results   Component Value Date    HGBA1C 7.0 (A) 12/22/2023     Hyperlipidemia  Her compliance with treatment has been fair.  She continues to work on her diet and what exercise she can tolerate. She remains on rosuvastatin and omega-3 fatty acid with no apparent side effects    Essential Hypertension  She continues to deny any chest pain, palpitations, dyspnea, orthopnea, paroxysmal nocturnal dyspnea or peripheral edema.  She remains on lisinopril and hydrochlorothiazide.     Chronic Pain Syndrome  She has chronic low back and joint pain as well as burning and tingling about her right foot.  She also has had some phantom pain since her left AKA.  She continues to deny any changes in her strength, gait, or bowel/bladder control.  She appears to be taking gabapentin as prescribed and feels that it is helped considerably    Depression  She remains bored at home and lonely at times, but continues to deny any persistent depression or loss of interest in activities.  Her appetite has been good and she has been  sleeping fairly well.  She denies any suicidal ideation.  She remains on hydroxyzine as needed    The following portions of the patient's history were reviewed and updated as appropriate: allergies, current medications, past medical history, past social history, and problem list.    Review of Systems   Constitutional:  Positive for fatigue. Negative for chills and fever.   HENT:  Negative for congestion, ear pain, rhinorrhea and sore throat.    Eyes:  Negative for visual disturbance.   Respiratory:  Negative for cough, shortness of breath and wheezing.    Cardiovascular:  Negative for chest pain, palpitations and leg swelling.   Gastrointestinal:  Positive for vomiting (became nauseous and vomited on the way here). Negative for abdominal pain, blood in stool, constipation, diarrhea and nausea.   Genitourinary:  Positive for urinary incontinence. Negative for dysuria and hematuria.   Musculoskeletal:  Positive for arthralgias, back pain and neck pain. Negative for joint swelling and myalgias.   Skin:  Negative for rash.   Neurological:  Positive for numbness. Negative for dizziness and weakness.   Psychiatric/Behavioral:  Positive for decreased concentration. Negative for sleep disturbance, suicidal ideas and depressed mood. The patient is nervous/anxious.      Objective   Physical Exam  Constitutional:       General: She is not in acute distress.     Appearance: Normal appearance. She is well-developed. She is not diaphoretic.      Comments: Accompanied by family member.  Alert and in fair spirits.  Sitting in a wheelchair.  No apparent distress. No pallor, jaundice, diaphoresis, or cyanosis.     HENT:      Head: Atraumatic.      Right Ear: Tympanic membrane, ear canal and external ear normal.      Left Ear: Tympanic membrane, ear canal and external ear normal.      Mouth/Throat:      Lips: No lesions.      Mouth: Mucous membranes are moist. No oral lesions.      Dentition: Dental caries present.      Pharynx: No  oropharyngeal exudate or posterior oropharyngeal erythema.   Eyes:      General: Lids are normal.      Extraocular Movements: Extraocular movements intact.      Conjunctiva/sclera: Conjunctivae normal.      Pupils: Pupils are equal.   Neck:      Thyroid: No thyroid mass or thyromegaly.      Vascular: No carotid bruit or JVD.      Trachea: Trachea normal. No tracheal deviation.   Cardiovascular:      Rate and Rhythm: Normal rate and regular rhythm.      Heart sounds: Normal heart sounds, S1 normal and S2 normal. No murmur heard.     No gallop.   Pulmonary:      Effort: Pulmonary effort is normal.      Breath sounds: Normal breath sounds.   Abdominal:      General: Bowel sounds are normal. There is no distension.      Tenderness: There is no abdominal tenderness.   Musculoskeletal:      Right lower leg: No edema.      Left lower leg: No edema.      Left Lower Extremity: Left leg is amputated above knee.   Lymphadenopathy:      Head:      Right side of head: No submental, submandibular, tonsillar, preauricular, posterior auricular or occipital adenopathy.      Left side of head: No submental, submandibular, tonsillar, preauricular, posterior auricular or occipital adenopathy.      Cervical: No cervical adenopathy.      Upper Body:      Right upper body: No supraclavicular adenopathy.      Left upper body: No supraclavicular adenopathy.   Skin:     General: Skin is warm.      Coloration: Skin is not cyanotic, jaundiced or pale.      Findings: No rash.      Nails: There is no clubbing.   Neurological:      Mental Status: She is alert and oriented to person, place, and time.      Cranial Nerves: No cranial nerve deficit, dysarthria or facial asymmetry.      Sensory: Sensory deficit (decreased vibration sense right foot) present.      Motor: No tremor.      Coordination: Coordination normal.      Gait: Gait normal.   Psychiatric:         Attention and Perception: Attention normal.         Mood and Affect: Mood normal.          Speech: Speech normal.         Behavior: Behavior normal.         Thought Content: Thought content normal.       Assessment & Plan   Problems Addressed this Visit          Cardiac and Vasculature    Essential hypertension   Hypertension: at goal. Evidence of target organ damage: none and chronic kidney disease.  Encouraged to continue to work on diet and exercise plan.   Continue current medication    Mixed hyperlipidemia   As above.   Continue current medication.    Relevant Medications    Omega 3 1000 MG capsule       Endocrine and Metabolic    Type 2 diabetes mellitus with peripheral neuropathy  Diabetes mellitus Type II, under excellent control.   Encouraged to continue to pursue ADA diet  Encouraged aerobic exercise.  Continue current medication  Previously scheduled labs drawn  Reminded that she is due for an updated eye exam.  She will arrange, will let us know if she has any difficulty doing so    Relevant Medications    gabapentin (NEURONTIN) 300 MG capsule       Gastrointestinal Abdominal     Gastroesophageal reflux disease without esophagitis       Genitourinary and Reproductive     Chronic renal failure, stage 2 (mild)  Reminded to avoid any NSAIDs prescription or OTC  Continue current medication  Will continue to monitor       Health Encounters    Healthcare maintenance  Patient remains uninterested in any cancer screening       Mental Health    Recurrent major depressive disorder, in partial remission  Stable.  Supportive therapy.   Continue current medication.  Encouraged to report if any worse or if any new symptoms or concerns.       Musculoskeletal and Injuries    Hx of AKA (above knee amputation), left       Neuro    Chronic pain syndrome  Reminded regarding symptomatic treatment.   Continue current medication  Encouraged to report if any worse or if any new symptoms or concerns.    Relevant Medications    gabapentin (NEURONTIN) 300 MG capsule       Tobacco    Current smoker     Diagnoses          Codes Comments    Mixed hyperlipidemia    -  Primary ICD-10-CM: E78.2  ICD-9-CM: 272.2     Essential hypertension     ICD-10-CM: I10  ICD-9-CM: 401.9     Type 2 diabetes mellitus with peripheral neuropathy     ICD-10-CM: E11.42  ICD-9-CM: 250.60, 357.2     Gastroesophageal reflux disease without esophagitis     ICD-10-CM: K21.9  ICD-9-CM: 530.81     Chronic renal failure, stage 2 (mild)     ICD-10-CM: N18.2  ICD-9-CM: 585.2     Healthcare maintenance     ICD-10-CM: Z00.00  ICD-9-CM: V70.0     Recurrent major depressive disorder, in partial remission     ICD-10-CM: F33.41  ICD-9-CM: 296.35     Hx of AKA (above knee amputation), left     ICD-10-CM: Z89.612  ICD-9-CM: V49.76     Chronic pain syndrome     ICD-10-CM: G89.4  ICD-9-CM: 338.4     Current smoker     ICD-10-CM: F17.200  ICD-9-CM: 305.1

## 2024-03-23 VITALS
DIASTOLIC BLOOD PRESSURE: 62 MMHG | SYSTOLIC BLOOD PRESSURE: 124 MMHG | RESPIRATION RATE: 14 BRPM | HEART RATE: 88 BPM | BODY MASS INDEX: 25.79 KG/M2 | TEMPERATURE: 97.2 F | HEIGHT: 65 IN | OXYGEN SATURATION: 97 %

## 2024-03-23 LAB
ALBUMIN SERPL-MCNC: 3.8 G/DL (ref 3.5–5.2)
ALBUMIN/GLOB SERPL: 1.1 G/DL
ALP SERPL-CCNC: 181 U/L (ref 39–117)
ALT SERPL W P-5'-P-CCNC: 21 U/L (ref 1–33)
ANION GAP SERPL CALCULATED.3IONS-SCNC: 11 MMOL/L (ref 5–15)
AST SERPL-CCNC: 20 U/L (ref 1–32)
BASOPHILS # BLD AUTO: 0.1 10*3/MM3 (ref 0–0.2)
BASOPHILS NFR BLD AUTO: 0.8 % (ref 0–1.5)
BILIRUB SERPL-MCNC: 0.3 MG/DL (ref 0–1.2)
BUN SERPL-MCNC: 32 MG/DL (ref 6–20)
BUN/CREAT SERPL: 17.4 (ref 7–25)
CALCIUM SPEC-SCNC: 8.9 MG/DL (ref 8.6–10.5)
CHLORIDE SERPL-SCNC: 105 MMOL/L (ref 98–107)
CHOLEST SERPL-MCNC: 198 MG/DL (ref 0–200)
CO2 SERPL-SCNC: 26 MMOL/L (ref 22–29)
CREAT SERPL-MCNC: 1.84 MG/DL (ref 0.57–1)
DEPRECATED RDW RBC AUTO: 42.7 FL (ref 37–54)
EGFRCR SERPLBLD CKD-EPI 2021: 31.7 ML/MIN/1.73
EOSINOPHIL # BLD AUTO: 0.45 10*3/MM3 (ref 0–0.4)
EOSINOPHIL NFR BLD AUTO: 3.8 % (ref 0.3–6.2)
ERYTHROCYTE [DISTWIDTH] IN BLOOD BY AUTOMATED COUNT: 13.5 % (ref 12.3–15.4)
GLOBULIN UR ELPH-MCNC: 3.5 GM/DL
GLUCOSE SERPL-MCNC: 103 MG/DL (ref 65–99)
HBA1C MFR BLD: 6.8 % (ref 4.8–5.6)
HCT VFR BLD AUTO: 44.5 % (ref 34–46.6)
HDLC SERPL-MCNC: 31 MG/DL (ref 40–60)
HGB BLD-MCNC: 14.7 G/DL (ref 12–15.9)
IMM GRANULOCYTES # BLD AUTO: 0.04 10*3/MM3 (ref 0–0.05)
IMM GRANULOCYTES NFR BLD AUTO: 0.3 % (ref 0–0.5)
LDLC SERPL CALC-MCNC: 121 MG/DL (ref 0–100)
LDLC/HDLC SERPL: 3.69 {RATIO}
LYMPHOCYTES # BLD AUTO: 3.31 10*3/MM3 (ref 0.7–3.1)
LYMPHOCYTES NFR BLD AUTO: 27.7 % (ref 19.6–45.3)
MCH RBC QN AUTO: 28.4 PG (ref 26.6–33)
MCHC RBC AUTO-ENTMCNC: 33 G/DL (ref 31.5–35.7)
MCV RBC AUTO: 86.1 FL (ref 79–97)
MONOCYTES # BLD AUTO: 0.87 10*3/MM3 (ref 0.1–0.9)
MONOCYTES NFR BLD AUTO: 7.3 % (ref 5–12)
NEUTROPHILS NFR BLD AUTO: 60.1 % (ref 42.7–76)
NEUTROPHILS NFR BLD AUTO: 7.2 10*3/MM3 (ref 1.7–7)
NRBC BLD AUTO-RTO: 0 /100 WBC (ref 0–0.2)
PLATELET # BLD AUTO: 261 10*3/MM3 (ref 140–450)
PMV BLD AUTO: 12.1 FL (ref 6–12)
POTASSIUM SERPL-SCNC: 4.2 MMOL/L (ref 3.5–5.2)
PROT SERPL-MCNC: 7.3 G/DL (ref 6–8.5)
RBC # BLD AUTO: 5.17 10*6/MM3 (ref 3.77–5.28)
SODIUM SERPL-SCNC: 142 MMOL/L (ref 136–145)
TRIGL SERPL-MCNC: 263 MG/DL (ref 0–150)
TSH SERPL DL<=0.05 MIU/L-ACNC: 1.02 UIU/ML (ref 0.27–4.2)
VIT B12 BLD-MCNC: 703 PG/ML (ref 211–946)
VLDLC SERPL-MCNC: 46 MG/DL (ref 5–40)
WBC NRBC COR # BLD AUTO: 11.97 10*3/MM3 (ref 3.4–10.8)

## 2024-03-23 RX ORDER — GABAPENTIN 300 MG/1
300 CAPSULE ORAL 3 TIMES DAILY
Qty: 90 CAPSULE | Refills: 2 | Status: SHIPPED | OUTPATIENT
Start: 2024-03-23

## 2024-03-23 RX ORDER — OMEGA-3 FATTY ACIDS/FISH OIL 300-1000MG
CAPSULE ORAL
Qty: 120 EACH | Refills: 5 | Status: SHIPPED | OUTPATIENT
Start: 2024-03-23

## 2024-04-05 DIAGNOSIS — K21.9 GASTROESOPHAGEAL REFLUX DISEASE WITHOUT ESOPHAGITIS: ICD-10-CM

## 2024-04-08 RX ORDER — ONDANSETRON 8 MG/1
TABLET, ORALLY DISINTEGRATING ORAL
Qty: 30 TABLET | Refills: 0 | Status: SHIPPED | OUTPATIENT
Start: 2024-04-08

## 2024-04-29 DIAGNOSIS — K21.9 GASTROESOPHAGEAL REFLUX DISEASE WITHOUT ESOPHAGITIS: ICD-10-CM

## 2024-04-29 RX ORDER — ONDANSETRON 8 MG/1
TABLET, ORALLY DISINTEGRATING ORAL
Qty: 30 TABLET | Refills: 0 | Status: SHIPPED | OUTPATIENT
Start: 2024-04-29

## 2024-05-06 DIAGNOSIS — E78.2 MIXED HYPERLIPIDEMIA: Chronic | ICD-10-CM

## 2024-05-06 RX ORDER — EZETIMIBE 10 MG/1
10 TABLET ORAL DAILY
Qty: 30 TABLET | Refills: 5 | Status: SHIPPED | OUTPATIENT
Start: 2024-05-06

## 2024-05-29 DIAGNOSIS — K21.9 GASTROESOPHAGEAL REFLUX DISEASE WITHOUT ESOPHAGITIS: ICD-10-CM

## 2024-05-30 RX ORDER — ONDANSETRON 8 MG/1
TABLET, ORALLY DISINTEGRATING ORAL
Qty: 30 TABLET | Refills: 0 | Status: SHIPPED | OUTPATIENT
Start: 2024-05-30

## 2024-06-03 ENCOUNTER — TELEPHONE (OUTPATIENT)
Dept: FAMILY MEDICINE CLINIC | Facility: CLINIC | Age: 59
End: 2024-06-03

## 2024-06-03 DIAGNOSIS — G89.4 CHRONIC PAIN SYNDROME: Primary | ICD-10-CM

## 2024-06-03 NOTE — TELEPHONE ENCOUNTER
Caller: Jocelyn Palomo    Relationship: Self    Best call back number: [phone]    What medication are you requesting: DICLOFENAC SODIUM TROPICAL GEL 1%    What are your current symptoms: BACK PAIN, PATIENT STATED THAT SHE HAS TO LAY ON HER BACK BECAUSE SHE HAS HAD HER LEG AMPUTATED     How long have you been experiencing symptoms: TWO WEEKS    Have you had these symptoms before:    [x] Yes  [] No    Have you been treated for these symptoms before:   [x] Yes  [] No    If a prescription is needed, what is your preferred pharmacy and phone number: [address] - [phone]  - [phone]     Additional notes:SHE STATED THAT SHE HAS TAKEN THIS MEDICATION BEFORE 2 GRAMS TO THE AREA 4 TIMES A DAY

## 2024-06-19 DIAGNOSIS — K21.9 GASTROESOPHAGEAL REFLUX DISEASE WITHOUT ESOPHAGITIS: ICD-10-CM

## 2024-06-19 RX ORDER — ONDANSETRON 8 MG/1
TABLET, ORALLY DISINTEGRATING ORAL
Qty: 30 TABLET | Refills: 2 | Status: SHIPPED | OUTPATIENT
Start: 2024-06-19

## 2024-06-25 DIAGNOSIS — G89.4 CHRONIC PAIN SYNDROME: ICD-10-CM

## 2024-06-25 DIAGNOSIS — E11.42 TYPE 2 DIABETES MELLITUS WITH PERIPHERAL NEUROPATHY: ICD-10-CM

## 2024-06-25 DIAGNOSIS — I10 ESSENTIAL HYPERTENSION: Chronic | ICD-10-CM

## 2024-06-25 RX ORDER — GABAPENTIN 300 MG/1
CAPSULE ORAL
Qty: 90 CAPSULE | Refills: 0 | Status: SHIPPED | OUTPATIENT
Start: 2024-06-25

## 2024-06-25 RX ORDER — POTASSIUM CHLORIDE 750 MG/1
10 TABLET, FILM COATED, EXTENDED RELEASE ORAL DAILY
Qty: 30 TABLET | Refills: 5 | Status: SHIPPED | OUTPATIENT
Start: 2024-06-25

## 2024-07-11 ENCOUNTER — OFFICE VISIT (OUTPATIENT)
Dept: FAMILY MEDICINE CLINIC | Facility: CLINIC | Age: 59
End: 2024-07-11
Payer: MEDICARE

## 2024-07-11 VITALS
RESPIRATION RATE: 14 BRPM | OXYGEN SATURATION: 98 % | SYSTOLIC BLOOD PRESSURE: 124 MMHG | BODY MASS INDEX: 25.79 KG/M2 | TEMPERATURE: 97.8 F | DIASTOLIC BLOOD PRESSURE: 64 MMHG | HEIGHT: 65 IN | HEART RATE: 86 BPM

## 2024-07-11 DIAGNOSIS — E78.2 MIXED HYPERLIPIDEMIA: Primary | ICD-10-CM

## 2024-07-11 DIAGNOSIS — G89.4 CHRONIC PAIN SYNDROME: ICD-10-CM

## 2024-07-11 DIAGNOSIS — K02.9 DENTAL CARIES: ICD-10-CM

## 2024-07-11 DIAGNOSIS — N18.2 CHRONIC RENAL FAILURE, STAGE 2 (MILD): ICD-10-CM

## 2024-07-11 DIAGNOSIS — E78.2 MIXED HYPERLIPIDEMIA: Chronic | ICD-10-CM

## 2024-07-11 DIAGNOSIS — I10 ESSENTIAL HYPERTENSION: ICD-10-CM

## 2024-07-11 DIAGNOSIS — Z00.00 HEALTHCARE MAINTENANCE: ICD-10-CM

## 2024-07-11 DIAGNOSIS — E11.42 TYPE 2 DIABETES MELLITUS WITH PERIPHERAL NEUROPATHY: Chronic | ICD-10-CM

## 2024-07-11 DIAGNOSIS — F33.41 RECURRENT MAJOR DEPRESSIVE DISORDER, IN PARTIAL REMISSION: ICD-10-CM

## 2024-07-11 DIAGNOSIS — Z89.612 HX OF AKA (ABOVE KNEE AMPUTATION), LEFT: ICD-10-CM

## 2024-07-11 DIAGNOSIS — K21.9 GASTROESOPHAGEAL REFLUX DISEASE WITHOUT ESOPHAGITIS: Chronic | ICD-10-CM

## 2024-07-11 DIAGNOSIS — L98.9 SKIN LESION: ICD-10-CM

## 2024-07-11 DIAGNOSIS — K21.9 GASTROESOPHAGEAL REFLUX DISEASE WITHOUT ESOPHAGITIS: ICD-10-CM

## 2024-07-11 DIAGNOSIS — E11.42 TYPE 2 DIABETES MELLITUS WITH PERIPHERAL NEUROPATHY: ICD-10-CM

## 2024-07-11 DIAGNOSIS — I10 ESSENTIAL HYPERTENSION: Chronic | ICD-10-CM

## 2024-07-11 DIAGNOSIS — E55.9 VITAMIN D DEFICIENCY: ICD-10-CM

## 2024-07-11 DIAGNOSIS — F17.200 CURRENT SMOKER: ICD-10-CM

## 2024-07-11 PROCEDURE — 3044F HG A1C LEVEL LT 7.0%: CPT | Performed by: GENERAL PRACTICE

## 2024-07-11 PROCEDURE — 99214 OFFICE O/P EST MOD 30 MIN: CPT | Performed by: GENERAL PRACTICE

## 2024-07-11 PROCEDURE — G0439 PPPS, SUBSEQ VISIT: HCPCS | Performed by: GENERAL PRACTICE

## 2024-07-11 PROCEDURE — 3074F SYST BP LT 130 MM HG: CPT | Performed by: GENERAL PRACTICE

## 2024-07-11 PROCEDURE — 1170F FXNL STATUS ASSESSED: CPT | Performed by: GENERAL PRACTICE

## 2024-07-11 PROCEDURE — 3078F DIAST BP <80 MM HG: CPT | Performed by: GENERAL PRACTICE

## 2024-07-11 RX ORDER — ERGOCALCIFEROL 1.25 MG/1
50000 CAPSULE ORAL
Qty: 5 CAPSULE | Refills: 5 | Status: SHIPPED | OUTPATIENT
Start: 2024-07-11

## 2024-07-11 RX ORDER — HYDROXYZINE HYDROCHLORIDE 25 MG/1
25 TABLET, FILM COATED ORAL 2 TIMES DAILY
Qty: 60 TABLET | Refills: 5 | Status: SHIPPED | OUTPATIENT
Start: 2024-07-11

## 2024-07-11 RX ORDER — GABAPENTIN 300 MG/1
300 CAPSULE ORAL 3 TIMES DAILY
Qty: 90 CAPSULE | Refills: 2 | Status: SHIPPED | OUTPATIENT
Start: 2024-07-11

## 2024-07-11 RX ORDER — PANTOPRAZOLE SODIUM 40 MG/1
40 TABLET, DELAYED RELEASE ORAL DAILY
Qty: 30 TABLET | Refills: 5 | Status: SHIPPED | OUTPATIENT
Start: 2024-07-11

## 2024-07-11 RX ORDER — LISINOPRIL AND HYDROCHLOROTHIAZIDE 25; 20 MG/1; MG/1
1 TABLET ORAL DAILY
Qty: 30 TABLET | Refills: 5 | Status: SHIPPED | OUTPATIENT
Start: 2024-07-11

## 2024-07-11 RX ORDER — LANCETS 28 GAUGE
1 EACH MISCELLANEOUS
Qty: 100 EACH | Refills: 5 | Status: SHIPPED | OUTPATIENT
Start: 2024-07-11

## 2024-07-11 RX ORDER — SEMAGLUTIDE 0.68 MG/ML
0.5 INJECTION, SOLUTION SUBCUTANEOUS WEEKLY
Qty: 3 ML | Refills: 5 | Status: SHIPPED | OUTPATIENT
Start: 2024-07-11

## 2024-07-11 RX ORDER — OMEGA-3 FATTY ACIDS/FISH OIL 300-1000MG
CAPSULE ORAL
Qty: 120 EACH | Refills: 5 | Status: SHIPPED | OUTPATIENT
Start: 2024-07-11

## 2024-07-11 RX ORDER — ROSUVASTATIN CALCIUM 40 MG/1
40 TABLET, COATED ORAL NIGHTLY
Qty: 30 TABLET | Refills: 5 | Status: SHIPPED | OUTPATIENT
Start: 2024-07-11

## 2024-07-11 NOTE — PROGRESS NOTES
The ABCs of the Annual Wellness Visit  Subsequent Medicare Wellness Visit    Subjective    Jocelyn Palomo is a 59 y.o. female who presents for a Subsequent Medicare Wellness Visit.    The following portions of the patient's history were reviewed and   updated as appropriate: allergies, current medications, past family history, past medical history, past social history, past surgical history, and problem list.    Compared to one year ago, the patient feels her physical   health is the same.    Compared to one year ago, the patient feels her mental   health is the same.    Recent Hospitalizations:  She was not admitted to the hospital during the last year.     Current Medical Providers:  Patient Care Team:  Miki Christianson MD as PCP - General (Family Medicine)    Outpatient Medications Prior to Visit   Medication Sig Dispense Refill    Diclofenac Sodium (VOLTAREN) 1 % gel gel Apply 4 g topically to the appropriate area as directed 4 (Four) Times a Day As Needed (joint pain). 500 g 5    ezetimibe (ZETIA) 10 MG tablet TAKE ONE (1) TABLET BY MOUTH ONCE DAILY. 30 tablet 5    Incontinence Supply Disposable (Depend Underwear Large) misc 1 each 3 (Three) Times a Day As Needed (for urinary incontinence). 90 each 2    Insulin Lispro (humaLOG) 100 UNIT/ML injection Inject 6 Units under the skin into the appropriate area as directed 3 (Three) Times a Day Before Meals. 3 mL 5    Insulin Pen Needle 32G X 4 MM misc Use 1 each 4 (Four) Times a Day. 120 each 5    ondansetron ODT (ZOFRAN-ODT) 8 MG disintegrating tablet DISSOLVE ONE TABLET ON THE TONGUE EVERY 12 HOURS AS NEEDED FOR NAUSEA OR VOMITING 30 tablet 2    potassium chloride 10 MEQ CR tablet TAKE ONE (1) TABLET BY MOUTH DAILY. 30 tablet 5    gabapentin (NEURONTIN) 300 MG capsule TAKE ONE (1) CAPSULE BY MOUTH THREE (3) (THREE) TIMES A DAY. 90 capsule 0    glucose blood test strip 1 each by Other route 3 (Three) Times a Day. 100 each 5    hydrOXYzine (ATARAX) 25 MG tablet  Take 1 tablet by mouth 2 (Two) Times a Day. 60 tablet 5    Lancets Ultra Fine misc Use 1 Device 3 (Three) Times a Day With Meals. 100 each 5    lisinopril-hydrochlorothiazide (PRINZIDE,ZESTORETIC) 20-25 MG per tablet Take 1 tablet by mouth Daily. 30 tablet 5    Omega 3 1000 MG capsule 2 twice daily 120 each 5    pantoprazole (PROTONIX) 40 MG EC tablet Take 1 tablet by mouth Daily. 30 tablet 5    rosuvastatin (CRESTOR) 40 MG tablet Take 1 tablet by mouth Every Night. 30 tablet 5    Semaglutide,0.25 or 0.5MG/DOS, (Ozempic, 0.25 or 0.5 MG/DOSE,) 2 MG/3ML solution pen-injector Inject 0.5 mg under the skin into the appropriate area as directed 1 (One) Time Per Week. 3 mL 5    vitamin D (ERGOCALCIFEROL) 1.25 MG (85570 UT) capsule capsule Take 1 capsule by mouth Every 7 (Seven) Days. 5 capsule 5     No facility-administered medications prior to visit.     No opioid medication identified on active medication list. I have reviewed chart for other potential  high risk medication/s and harmful drug interactions in the elderly.        Aspirin is not on active medication list.  Aspirin use is not indicated based on review of current medical condition/s. Risk of harm outweighs potential benefits.  .    Patient Active Problem List   Diagnosis    Recurrent major depressive disorder, in partial remission    Gastroesophageal reflux disease without esophagitis    Chronic pain syndrome    Type 2 diabetes mellitus with peripheral neuropathy    Mixed hyperlipidemia    Essential hypertension    Current smoker    Encounter for immunization    Healthcare maintenance    Chronic renal failure, stage 2 (mild)    Hx of AKA (above knee amputation), left    Skin lesion    Dental caries     Advance Care Planning   Advance Care Planning     Advance Directive is not on file.  ACP discussion was held with the patient during this visit. Patient does not have an advance directive, information provided.     Objective    Vitals:    07/11/24 1554   BP:  "124/64   Pulse: 86   Resp: 14   Temp: 97.8 °F (36.6 °C)   TempSrc: Temporal   SpO2: 98%   Weight: Comment: not able to weigh, in wc   Height: 165.1 cm (65\")     Estimated body mass index is 25.79 kg/m² as calculated from the following:    Height as of this encounter: 165.1 cm (65\").    Weight as of 23: 70.3 kg (155 lb).    Does the patient have evidence of cognitive impairment? No       HEALTH RISK ASSESSMENT    Smoking Status:  Social History     Tobacco Use   Smoking Status Every Day    Current packs/day: 1.00    Average packs/day: 1 pack/day for 11.0 years (11.0 ttl pk-yrs)    Types: Cigarettes   Smokeless Tobacco Never   Tobacco Comments    Encouraged smoking cessation     Alcohol Consumption:  Social History     Substance and Sexual Activity   Alcohol Use No     Fall Risk Screen:  ALEXYS Fall Risk Assessment was completed, and patient is at LOW risk for falls.Assessment completed on:2024    Depression Screenin/11/2024     3:55 PM   PHQ-2/PHQ-9 Depression Screening   Little Interest or Pleasure in Doing Things 0-->not at all   Feeling Down, Depressed or Hopeless 0-->not at all   PHQ-9: Brief Depression Severity Measure Score 0     Health Habits and Functional and Cognitive Screenin/11/2024     3:55 PM   Functional & Cognitive Status   Do you have difficulty preparing food and eating? No   Do you have difficulty bathing yourself, getting dressed or grooming yourself? No   Do you have difficulty using the toilet? No   Do you have difficulty moving around from place to place? No   Do you have trouble with steps or getting out of a bed or a chair? No   Current Diet Well Balanced Diet   Dental Exam Not up to date   Eye Exam Not up to date   Exercise (times per week) 0 times per week   Current Exercises Include No Regular Exercise   Do you need help using the phone?  No   Are you deaf or do you have serious difficulty hearing?  No   Do you need help to go to places out of walking distance? " No   Do you need help shopping? Yes   Do you need help preparing meals?  Yes   Do you need help with housework?  Yes   Do you need help with laundry? Yes   Do you need help taking your medications? No   Do you need help managing money? No   Do you ever drive or ride in a car without wearing a seat belt? No   Have you felt unusual stress, anger or loneliness in the last month? No   Who do you live with? Child   If you need help, do you have trouble finding someone available to you? No   Have you been bothered in the last four weeks by sexual problems? No   Do you have difficulty concentrating, remembering or making decisions? No     Age-appropriate Screening Schedule:  Refer to the list below for future screening recommendations based on patient's age, sex and/or medical conditions. Orders for these recommended tests are listed in the plan section. The patient has been provided with a written plan.    Health Maintenance   Topic Date Due    Hepatitis B (1 of 3 - 19+ 3-dose series) Never done    ZOSTER VACCINE (1 of 2) Never done    HEPATITIS C SCREENING  Never done    URINE MICROALBUMIN  01/10/2018    DIABETIC FOOT EXAM  08/08/2018    DIABETIC EYE EXAM  06/16/2023    ANNUAL WELLNESS VISIT  07/22/2023    HEMOGLOBIN A1C  06/22/2024    COLORECTAL CANCER SCREENING  12/22/2024 (Originally 1965)    COVID-19 Vaccine (1 - 2023-24 season) 03/23/2025 (Originally 9/1/2023)    MAMMOGRAM  03/23/2025 (Originally 3/2/2020)    PAP SMEAR  03/23/2025 (Originally 3/2/2021)    DXA SCAN  03/23/2025 (Originally 1965)    INFLUENZA VACCINE  08/01/2024    BMI FOLLOWUP  12/22/2024    LIPID PANEL  03/22/2025    TDAP/TD VACCINES (5 - Td or Tdap) 10/04/2025    Pneumococcal Vaccine 0-64  Completed        CMS Preventative Services Quick Reference  Risk Factors Identified During Encounter  Chronic Pain: Natural history and expected course discussed. Questions answered.  Depression/Dysphoria: Current medication is effective, no change  recommended  Fall Risk-High or Moderate: Discussed Fall Prevention in the home  Immunizations Discussed/Encouraged: Influenza, Shingrix, and COVID19  Tobacco Use/Dependance Risk (use dotphrase .tobaccocessation for documentation)  The above risks/problems have been discussed with the patient.  Pertinent information has been shared with the patient in the After Visit Summary.  An After Visit Summary and PPPS were made available to the patient.    Follow Up:   Next Medicare Wellness visit to be scheduled in 1 year.       Additional E&M Note during same encounter follows:  Patient has multiple medical problems which are significant and separately identifiable that require additional work above and beyond the Medicare Wellness Visit.      Chief Complaint  She returns for a scheduled reassessment of multiple medical problems including previous left AKA, type 2 diabetes mellitus, hyperlipidemia, essential hypertension, chronic pain syndrome, and depression    Subjective        HPI    Left AKA  She is post left AKA.  She continues to live at home relatively independently.  She has family close by that check on her regularly.  She has received her prosthetic but is struggling with it    Type 2 Diabetes Mellitus  She admits to paresthesias of the right foot along with intermittent visual blurring.  She continues to deny any visual loss, polydipsia, polyuria, or hypoglycemia. Evaluation to date has been: hemoglobin A1C. Current treatments: GLP agonist-semaglutide, basal insulin - levemir 50 qd and mealtime insulin - novolog 10 with meals.  She is due for an updated diabetic eye exam   Lab Results   Component Value Date    HGBA1C 6.80 (H) 03/22/2024     Lab Results   Component Value Date    [id number] 703 03/22/2024     Hyperlipidemia  Her compliance with treatment has been fair.  She continues to work on her diet and what exercise she can tolerate. She remains on rosuvastatin and omega-3 fatty acid with no apparent side  effects  Lab Results   Component Value Date    CHOL 198 03/22/2024    CHLPL 255 (H) 04/08/2022    TRIG 263 (H) 03/22/2024    HDL 31 (L) 03/22/2024     (H) 03/22/2024     Essential Hypertension  She continues to deny any chest pain, palpitations, dyspnea, orthopnea, paroxysmal nocturnal dyspnea or peripheral edema.  She remains on lisinopril and hydrochlorothiazide.   Lab Results   Component Value Date    GLUCOSE 103 (H) 03/22/2024    BUN 32 (H) 03/22/2024    CREATININE 1.84 (H) 03/22/2024    EGFRRESULT 63.6 04/08/2022    EGFR 31.7 (L) 03/22/2024    BCR 17.4 03/22/2024    K 4.2 03/22/2024    CO2 26.0 03/22/2024    CALCIUM 8.9 03/22/2024    PROTENTOTREF 7.5 04/08/2022    ALBUMIN 3.8 03/22/2024    BILITOT 0.3 03/22/2024    AST 20 03/22/2024    ALT 21 03/22/2024     Lab Results   Component Value Date    ALKPHOS 181 (H) 03/22/2024     Chronic Pain Syndrome  She has chronic low back and joint pain as well as burning and tingling about her right foot.  She also has had some phantom pain since her left AKA.  She continues to deny any changes in her strength, gait, or bowel/bladder control.  She appears to be taking gabapentin as prescribed and feels that it is helped considerably    Depression  She remains bored at home and lonely at times, but continues to deny any persistent depression or loss of interest in activities.  Her appetite has been good and she has been sleeping fairly well.  She denies any suicidal ideation.  She remains on hydroxyzine as needed  Lab Results   Component Value Date    TSH 1.020 03/22/2024     Review of Systems   Constitutional:  Positive for fatigue. Negative for appetite change, chills, diaphoresis and fever.   HENT:  Negative for congestion, ear pain, postnasal drip, rhinorrhea, sinus pressure, sneezing, sore throat, tinnitus and voice change.    Eyes:  Negative for visual disturbance.   Respiratory:  Negative for cough, shortness of breath and wheezing.    Cardiovascular:  Negative  "for chest pain, palpitations and leg swelling.   Gastrointestinal:  Negative for abdominal pain, blood in stool, constipation, diarrhea, nausea and vomiting.   Genitourinary:  Negative for dysuria, frequency, hematuria and urgency.        Intermittent urinary incontinence   Musculoskeletal:  Positive for arthralgias, back pain and neck pain. Negative for joint swelling and myalgias.   Skin:  Negative for rash.   Neurological:  Positive for numbness. Negative for weakness and confusion.   Psychiatric/Behavioral:  Positive for decreased concentration and sleep disturbance. Negative for dysphoric mood and suicidal ideas. The patient is not nervous/anxious.      Objective   Vital Signs:  /64   Pulse 86   Temp 97.8 °F (36.6 °C) (Temporal)   Resp 14   Ht 165.1 cm (65\")   SpO2 98%   BMI 25.79 kg/m²     Physical Exam  Constitutional:       General: She is not in acute distress.     Appearance: Normal appearance. She is well-developed. She is not diaphoretic.      Comments: Accompanied by family member.  Alert and in fair spirits.  Sitting in a wheelchair.  No apparent distress. No pallor, jaundice, diaphoresis, or cyanosis.     HENT:      Head: Atraumatic.      Right Ear: Tympanic membrane, ear canal and external ear normal.      Left Ear: Tympanic membrane, ear canal and external ear normal.      Mouth/Throat:      Lips: No lesions.      Mouth: Mucous membranes are moist. No oral lesions.      Dentition: Dental caries present.      Pharynx: No oropharyngeal exudate or posterior oropharyngeal erythema.   Eyes:      General: Lids are normal.      Extraocular Movements: Extraocular movements intact.      Conjunctiva/sclera: Conjunctivae normal.      Pupils: Pupils are equal.   Neck:      Thyroid: No thyroid mass or thyromegaly.      Vascular: No carotid bruit or JVD.      Trachea: Trachea normal. No tracheal deviation.   Cardiovascular:      Rate and Rhythm: Normal rate and regular rhythm.      Heart sounds: " Normal heart sounds, S1 normal and S2 normal. No murmur heard.     No gallop.   Pulmonary:      Effort: Pulmonary effort is normal.      Breath sounds: Normal breath sounds.   Abdominal:      General: Bowel sounds are normal. There is no distension or abdominal bruit.      Palpations: Abdomen is soft. There is no hepatomegaly, splenomegaly or mass.      Tenderness: There is no abdominal tenderness.      Hernia: No hernia is present.   Musculoskeletal:      Right lower leg: No edema.      Left lower leg: No edema.      Left Lower Extremity: Left leg is amputated above knee.   Lymphadenopathy:      Head:      Right side of head: No submental, submandibular, tonsillar, preauricular, posterior auricular or occipital adenopathy.      Left side of head: No submental, submandibular, tonsillar, preauricular, posterior auricular or occipital adenopathy.      Cervical: No cervical adenopathy.      Upper Body:      Right upper body: No supraclavicular adenopathy.      Left upper body: No supraclavicular adenopathy.   Skin:     General: Skin is warm.      Coloration: Skin is not cyanotic, jaundiced or pale.      Findings: No rash.      Nails: There is no clubbing.   Neurological:      Mental Status: She is alert and oriented to person, place, and time.      Cranial Nerves: No cranial nerve deficit, dysarthria or facial asymmetry.      Sensory: Sensory deficit (decreased vibration sense right foot) present.      Motor: No tremor.      Coordination: Coordination normal.      Gait: Gait normal.   Psychiatric:         Attention and Perception: Attention normal.         Mood and Affect: Mood normal.         Speech: Speech normal.         Behavior: Behavior normal.         Thought Content: Thought content normal.           Assessment and Plan   Diagnoses and all orders for this visit:    1. Mixed hyperlipidemia   Encouraged to continue to work on her diet and exercise plan.  Continue current medication  -     Omega 3 1000 MG capsule;  2 twice daily  Dispense: 120 each; Refill: 5  -     rosuvastatin (CRESTOR) 40 MG tablet; Take 1 tablet by mouth Every Night.  Dispense: 30 tablet; Refill: 5    2. Essential hypertension  As above.   Continue current medication  -     lisinopril-hydrochlorothiazide (PRINZIDE,ZESTORETIC) 20-25 MG per tablet; Take 1 tablet by mouth Daily.  Dispense: 30 tablet; Refill: 5    3. Gastroesophageal reflux disease without esophagitis   Symptoms are currently well controlled.  Continue current medication.  -     pantoprazole (PROTONIX) 40 MG EC tablet; Take 1 tablet by mouth Daily.  Dispense: 30 tablet; Refill: 5    4. Chronic renal failure, stage 2 (mild)  Reminded to avoid any NSAIDs prescription or OTC  Will continue to monitor    5. Healthcare maintenance  Patient remains uninterested in any cancer screening or in COVID-19 vaccination  Recommended a flu shot when available  Reminded of the benefits of Shingrix    6. Recurrent major depressive disorder, in partial remission  Significant situational component.   Supportive therapy.   Continue current medication.  Encouraged to report if any worse or if any new symptoms or concerns.  -     hydrOXYzine (ATARAX) 25 MG tablet; Take 1 tablet by mouth 2 (Two) Times a Day.  Dispense: 60 tablet; Refill: 5    7. Hx of AKA (above knee amputation), left  Encouraged to follow-up with her prosthetics clinic    8. Chronic pain syndrome  Reminded regarding symptomatic treatment.   Continue current medication  Encouraged to report if any worse or if any new symptoms or concerns.  -     gabapentin (NEURONTIN) 300 MG capsule; Take 1 capsule by mouth 3 (Three) Times a Day.  Dispense: 90 capsule; Refill: 2    9. Current smoker  Lengthy discussion regarding the potential sequela of continued tobacco use and the options with respect to cessation.  Patient uninterested in pursuing at present but will consider.    10. Type 2 diabetes mellitus with peripheral neuropathy  Diabetes mellitus Type II,  under excellent control.   Encouraged to continue to pursue ADA diet  Encouraged aerobic exercise.  Continue current medication  Updated labs will be drawn at her return  -     gabapentin (NEURONTIN) 300 MG capsule; Take 1 capsule by mouth 3 (Three) Times a Day.  Dispense: 90 capsule; Refill: 2  -     Semaglutide,0.25 or 0.5MG/DOS, (Ozempic, 0.25 or 0.5 MG/DOSE,) 2 MG/3ML solution pen-injector; Inject 0.5 mg under the skin into the appropriate area as directed 1 (One) Time Per Week.  Dispense: 3 mL; Refill: 5  -     glucose blood test strip; 1 each by Other route 3 (Three) Times a Day.  Dispense: 100 each; Refill: 5  -     Lancets Ultra Fine misc; Use 1 Device 3 (Three) Times a Day With Meals.  Dispense: 100 each; Refill: 5    12. Dental caries  Follow up with her dentist       Follow Up   Return in about 3 months (around 10/11/2024).  Patient was given instructions and counseling regarding her condition or for health maintenance advice. Please see specific information pulled into the AVS if appropriate.

## 2024-07-23 DIAGNOSIS — E11.42 TYPE 2 DIABETES MELLITUS WITH PERIPHERAL NEUROPATHY: ICD-10-CM

## 2024-07-23 DIAGNOSIS — G89.4 CHRONIC PAIN SYNDROME: ICD-10-CM

## 2024-07-23 RX ORDER — GABAPENTIN 300 MG/1
300 CAPSULE ORAL 3 TIMES DAILY
Qty: 90 CAPSULE | Refills: 2 | OUTPATIENT
Start: 2024-07-23

## 2024-07-23 NOTE — TELEPHONE ENCOUNTER
Caller: Jocelyn Palomo    Relationship: Self    Best call back number: [phone]     Requested Prescriptions:   Requested Prescriptions     Pending Prescriptions Disp Refills    gabapentin (NEURONTIN) 300 MG capsule 90 capsule 2     Sig: Take 1 capsule by mouth 3 (Three) Times a Day.        Pharmacy where request should be sent: [id number] [address] [facility] [address] [address] - [phone] PH - [phone] FX     Last office visit with prescribing clinician: 7/11/2024   Last telemedicine visit with prescribing clinician: Visit date not found   Next office visit with prescribing clinician: 10/17/2024     Additional details provided by patient: HAS 1 + DAYS LEFT.    Does the patient have less than a 3 day supply:  [x] Yes  [] No    Would you like a call back once the refill request has been completed: [x] Yes [] No    If the office needs to give you a call back, can they leave a voicemail: [] Yes [] No    Hollis Bernard Rep   07/23/24 13:15 EDT

## 2024-07-31 DIAGNOSIS — K21.9 GASTROESOPHAGEAL REFLUX DISEASE WITHOUT ESOPHAGITIS: ICD-10-CM

## 2024-08-01 RX ORDER — ONDANSETRON 8 MG/1
8 TABLET, ORALLY DISINTEGRATING ORAL EVERY 12 HOURS PRN
Qty: 30 TABLET | Refills: 2 | Status: SHIPPED | OUTPATIENT
Start: 2024-08-01

## 2024-08-15 DIAGNOSIS — G89.4 CHRONIC PAIN SYNDROME: ICD-10-CM

## 2024-08-15 DIAGNOSIS — E11.42 TYPE 2 DIABETES MELLITUS WITH PERIPHERAL NEUROPATHY: ICD-10-CM

## 2024-08-16 RX ORDER — GABAPENTIN 300 MG/1
300 CAPSULE ORAL 3 TIMES DAILY
Qty: 90 CAPSULE | Refills: 2 | Status: SHIPPED | OUTPATIENT
Start: 2024-08-16

## 2024-08-16 RX ORDER — GABAPENTIN 300 MG/1
300 CAPSULE ORAL 3 TIMES DAILY
Qty: 90 CAPSULE | Refills: 2 | OUTPATIENT
Start: 2024-08-16

## 2024-09-30 DIAGNOSIS — K21.9 GASTROESOPHAGEAL REFLUX DISEASE WITHOUT ESOPHAGITIS: ICD-10-CM

## 2024-09-30 RX ORDER — ONDANSETRON 8 MG/1
TABLET, ORALLY DISINTEGRATING ORAL
Qty: 30 TABLET | Refills: 2 | Status: SHIPPED | OUTPATIENT
Start: 2024-09-30

## 2024-10-27 DIAGNOSIS — E78.2 MIXED HYPERLIPIDEMIA: Chronic | ICD-10-CM

## 2024-10-28 RX ORDER — EZETIMIBE 10 MG/1
10 TABLET ORAL DAILY
Qty: 30 TABLET | Refills: 5 | Status: SHIPPED | OUTPATIENT
Start: 2024-10-28

## 2024-11-14 DIAGNOSIS — K21.9 GASTROESOPHAGEAL REFLUX DISEASE WITHOUT ESOPHAGITIS: ICD-10-CM

## 2024-11-14 RX ORDER — ONDANSETRON 8 MG/1
TABLET, ORALLY DISINTEGRATING ORAL
Qty: 30 TABLET | Refills: 2 | Status: SHIPPED | OUTPATIENT
Start: 2024-11-14

## 2024-11-18 DIAGNOSIS — E11.42 TYPE 2 DIABETES MELLITUS WITH PERIPHERAL NEUROPATHY: ICD-10-CM

## 2024-11-18 DIAGNOSIS — G89.4 CHRONIC PAIN SYNDROME: ICD-10-CM

## 2024-11-18 RX ORDER — GABAPENTIN 300 MG/1
300 CAPSULE ORAL 3 TIMES DAILY
Qty: 90 CAPSULE | Refills: 1 | Status: SHIPPED | OUTPATIENT
Start: 2024-11-18

## 2025-01-17 RX ORDER — PANTOPRAZOLE SODIUM 40 MG/1
40 TABLET, DELAYED RELEASE ORAL DAILY
Qty: 30 TABLET | OUTPATIENT
Start: 2025-01-17

## 2025-01-24 RX ORDER — LISINOPRIL AND HYDROCHLOROTHIAZIDE 20; 25 MG/1; MG/1
1 TABLET ORAL DAILY
Qty: 30 TABLET | OUTPATIENT
Start: 2025-01-24

## 2025-01-24 RX ORDER — ROSUVASTATIN CALCIUM 40 MG/1
40 TABLET, COATED ORAL EVERY EVENING
Qty: 30 TABLET | OUTPATIENT
Start: 2025-01-24